# Patient Record
Sex: MALE | Race: WHITE | NOT HISPANIC OR LATINO | Employment: FULL TIME | URBAN - METROPOLITAN AREA
[De-identification: names, ages, dates, MRNs, and addresses within clinical notes are randomized per-mention and may not be internally consistent; named-entity substitution may affect disease eponyms.]

---

## 2017-05-09 ENCOUNTER — OFFICE VISIT (OUTPATIENT)
Dept: ORTHOPEDIC SURGERY | Facility: CLINIC | Age: 52
End: 2017-05-09

## 2017-05-09 VITALS — TEMPERATURE: 98.1 F | HEIGHT: 69 IN | WEIGHT: 266.2 LBS | BODY MASS INDEX: 39.43 KG/M2

## 2017-05-09 DIAGNOSIS — M17.11 PRIMARY OSTEOARTHRITIS OF RIGHT KNEE: Primary | ICD-10-CM

## 2017-05-09 PROCEDURE — 99203 OFFICE O/P NEW LOW 30 MIN: CPT | Performed by: ORTHOPAEDIC SURGERY

## 2017-05-09 RX ORDER — MELOXICAM 7.5 MG/1
15 TABLET ORAL ONCE
Status: CANCELLED | OUTPATIENT
Start: 2017-05-09 | End: 2017-05-09

## 2017-05-09 RX ORDER — LAMOTRIGINE 200 MG/1
TABLET ORAL
Refills: 3 | COMMUNITY
Start: 2017-02-16 | End: 2017-06-15 | Stop reason: SDUPTHER

## 2017-05-09 RX ORDER — MELATONIN
1000 DAILY
COMMUNITY
End: 2017-06-15 | Stop reason: SDUPTHER

## 2017-05-09 RX ORDER — LETROZOLE 2.5 MG/1
2.5 TABLET, FILM COATED ORAL
COMMUNITY
End: 2017-06-15

## 2017-05-09 RX ORDER — PREGABALIN 25 MG/1
150 CAPSULE ORAL ONCE
Status: CANCELLED | OUTPATIENT
Start: 2017-05-09 | End: 2017-05-09

## 2017-05-09 RX ORDER — FENOFIBRATE 160 MG/1
TABLET ORAL
COMMUNITY
Start: 2017-05-08 | End: 2017-06-15

## 2017-05-09 RX ORDER — CEFAZOLIN SODIUM 2 G/100ML
2 INJECTION, SOLUTION INTRAVENOUS ONCE
Status: CANCELLED | OUTPATIENT
Start: 2017-05-09 | End: 2017-05-09

## 2017-05-09 RX ORDER — NAPROXEN SODIUM 220 MG
220 TABLET ORAL 2 TIMES DAILY PRN
COMMUNITY
End: 2017-06-27 | Stop reason: HOSPADM

## 2017-05-09 RX ORDER — NICOTINE POLACRILEX 2 MG
5000 LOZENGE BUCCAL DAILY
COMMUNITY
End: 2022-03-02

## 2017-05-09 RX ORDER — LISINOPRIL 20 MG/1
TABLET ORAL
Refills: 2 | COMMUNITY
Start: 2017-04-05 | End: 2017-06-15 | Stop reason: SDUPTHER

## 2017-05-09 RX ORDER — OMEPRAZOLE 10 MG/1
CAPSULE, DELAYED RELEASE ORAL
Refills: 2 | COMMUNITY
Start: 2017-04-05 | End: 2017-06-15 | Stop reason: SDUPTHER

## 2017-06-13 ENCOUNTER — TELEPHONE (OUTPATIENT)
Dept: ORTHOPEDIC SURGERY | Facility: CLINIC | Age: 52
End: 2017-06-13

## 2017-06-15 ENCOUNTER — APPOINTMENT (OUTPATIENT)
Dept: PREADMISSION TESTING | Facility: HOSPITAL | Age: 52
End: 2017-06-15

## 2017-06-15 VITALS
HEART RATE: 68 BPM | OXYGEN SATURATION: 96 % | BODY MASS INDEX: 39.01 KG/M2 | RESPIRATION RATE: 18 BRPM | DIASTOLIC BLOOD PRESSURE: 87 MMHG | TEMPERATURE: 98.4 F | WEIGHT: 263.4 LBS | HEIGHT: 69 IN | SYSTOLIC BLOOD PRESSURE: 145 MMHG

## 2017-06-15 DIAGNOSIS — M17.11 PRIMARY OSTEOARTHRITIS OF RIGHT KNEE: ICD-10-CM

## 2017-06-15 LAB
ANION GAP SERPL CALCULATED.3IONS-SCNC: 12.6 MMOL/L
BILIRUB UR QL STRIP: NEGATIVE
BUN BLD-MCNC: 15 MG/DL (ref 6–20)
BUN/CREAT SERPL: 12.1 (ref 7–25)
CALCIUM SPEC-SCNC: 9.8 MG/DL (ref 8.6–10.5)
CHLORIDE SERPL-SCNC: 98 MMOL/L (ref 98–107)
CLARITY UR: CLEAR
CO2 SERPL-SCNC: 27.4 MMOL/L (ref 22–29)
COLOR UR: YELLOW
CREAT BLD-MCNC: 1.24 MG/DL (ref 0.76–1.27)
DEPRECATED RDW RBC AUTO: 41.7 FL (ref 37–54)
ERYTHROCYTE [DISTWIDTH] IN BLOOD BY AUTOMATED COUNT: 13.7 % (ref 11.5–14.5)
GFR SERPL CREATININE-BSD FRML MDRD: 62 ML/MIN/1.73
GLUCOSE BLD-MCNC: 94 MG/DL (ref 65–99)
GLUCOSE UR STRIP-MCNC: NEGATIVE MG/DL
HCT VFR BLD AUTO: 52.7 % (ref 40.4–52.2)
HGB BLD-MCNC: 17.7 G/DL (ref 13.7–17.6)
HGB UR QL STRIP.AUTO: NEGATIVE
KETONES UR QL STRIP: NEGATIVE
LEUKOCYTE ESTERASE UR QL STRIP.AUTO: NEGATIVE
MCH RBC QN AUTO: 28.5 PG (ref 27–32.7)
MCHC RBC AUTO-ENTMCNC: 33.6 G/DL (ref 32.6–36.4)
MCV RBC AUTO: 85 FL (ref 79.8–96.2)
NITRITE UR QL STRIP: NEGATIVE
PH UR STRIP.AUTO: 5.5 [PH] (ref 5–8)
PLATELET # BLD AUTO: 199 10*3/MM3 (ref 140–500)
PMV BLD AUTO: 10.3 FL (ref 6–12)
POTASSIUM BLD-SCNC: 3.9 MMOL/L (ref 3.5–5.2)
PROT UR QL STRIP: NEGATIVE
RBC # BLD AUTO: 6.2 10*6/MM3 (ref 4.6–6)
SODIUM BLD-SCNC: 138 MMOL/L (ref 136–145)
SP GR UR STRIP: 1.02 (ref 1–1.03)
UROBILINOGEN UR QL STRIP: NORMAL
WBC NRBC COR # BLD: 5.55 10*3/MM3 (ref 4.5–10.7)

## 2017-06-15 PROCEDURE — 36415 COLL VENOUS BLD VENIPUNCTURE: CPT

## 2017-06-15 PROCEDURE — 93005 ELECTROCARDIOGRAM TRACING: CPT

## 2017-06-15 PROCEDURE — 93010 ELECTROCARDIOGRAM REPORT: CPT | Performed by: INTERNAL MEDICINE

## 2017-06-15 PROCEDURE — 80048 BASIC METABOLIC PNL TOTAL CA: CPT | Performed by: ORTHOPAEDIC SURGERY

## 2017-06-15 PROCEDURE — 85027 COMPLETE CBC AUTOMATED: CPT | Performed by: ORTHOPAEDIC SURGERY

## 2017-06-15 PROCEDURE — 81003 URINALYSIS AUTO W/O SCOPE: CPT | Performed by: ORTHOPAEDIC SURGERY

## 2017-06-15 RX ORDER — TESTOSTERONE 200 MG
PELLET (EA) IMPLANTATION
COMMUNITY
End: 2021-09-07

## 2017-06-15 RX ORDER — CHLORHEXIDINE GLUCONATE 500 MG/1
1 CLOTH TOPICAL TAKE AS DIRECTED
COMMUNITY
End: 2017-06-27 | Stop reason: HOSPADM

## 2017-06-15 RX ORDER — LAMOTRIGINE 200 MG/1
200 TABLET ORAL DAILY
COMMUNITY
End: 2021-08-10 | Stop reason: SDUPTHER

## 2017-06-15 RX ORDER — LISINOPRIL 20 MG/1
20 TABLET ORAL DAILY
COMMUNITY
End: 2021-08-10 | Stop reason: SDUPTHER

## 2017-06-15 RX ORDER — OMEPRAZOLE 10 MG/1
20 CAPSULE, DELAYED RELEASE ORAL DAILY
COMMUNITY
End: 2021-07-02 | Stop reason: SDUPTHER

## 2017-06-15 NOTE — DISCHARGE INSTRUCTIONS
Take the following medications the morning of surgery with a small sip of water:  OMEPRAZOLE, BRING IN LISINOPRIL AM OF SURGERY    Arrive to hospital on your day of surgery at 9:00 am    General Instructions:  • Do not eat solid food after midnight the night before surgery.  • You may drink clear liquids day of surgery but must stop at least one hour before your hospital arrival time (8:00am)  • It is beneficial for you to have a clear drink that contains carbohydrates the day of surgery.  We suggest a 20 ounce bottle of Gatorade or Powerade for non-diabetic patients or a 20 ounce bottle of G2 or Powerade Zero for diabetic patients. (Pediatric patients, are not advised to drink a 20 ounce carbohydrate drink)    Clear liquids are liquids you can see through.  Nothing red in color.     Plain water                               Sports drinks  Sodas                                   Gelatin (Jell-O)  Fruit juices without pulp such as white grape juice and apple juice  Popsicles that contain no fruit or yogurt  Tea or coffee (no cream or milk added)  Gatorade / Powerade  G2 / Powerade Zero    • Infants may have breast milk up to four hours before surgery.  • Infants drinking formula may drink formula up to six hours before surgery.   • Patients who avoid smoking, chewing tobacco and alcohol for 4 weeks prior to surgery have a reduced risk of post-operative complications.  Quit smoking as many days before surgery as you can.  • Do not smoke, use chewing tobacco or drink alcohol the day of surgery.   • If applicable bring your C-PAP/ BI-PAP machine.  • Bring any papers given to you in the doctor’s office.  • Wear clean comfortable clothes and socks.  • Do not wear contact lenses or make-up.  Bring a case for your glasses.   • Bring crutches or walker if applicable.  • Leave all other valuables and jewelry at home.  • The Pre-Admission Testing nurse will instruct you to bring medications if unable to obtain an accurate list  in Pre-Admission Testing.        If you were given a blood bank ID arm band remember to bring it with you the day of surgery.    Preventing a Surgical Site Infection:  • For 2 to 3 days before surgery, avoid shaving with a razor because the razor can irritate skin and make it easier to develop an infection.  • The night prior to surgery sleep in a clean bed with clean clothing.  Do not allow pets to sleep with you.  • Shower on the morning of surgery using a fresh bar of anti-bacterial soap (such as Dial) and clean washcloth.  Dry with a clean towel and dress in clean clothing.  • Ask your surgeon if you will be receiving antibiotics prior to surgery.  • Make sure you, your family, and all healthcare providers clean their hands with soap and water or an alcohol based hand  before caring for you or your wound.    Day of surgery:  Upon arrival, a Pre-op nurse and Anesthesiologist will review your health history, obtain vital signs, and answer questions you may have.  The only belongings needed at this time will be your home medications and if applicable your C-PAP/BI-PAP machine.  If you are staying overnight your family can leave the rest of your belongings in the car and bring them to your room later.  A Pre-op nurse will start an IV and you may receive medication in preparation for surgery, including something to help you relax.  Your family will be able to see you in the Pre-op area.  While you are in surgery your family should notify the waiting room  if they leave the waiting room area and provide a contact phone number.    Please be aware that surgery does come with discomfort.  We want to make every effort to control your discomfort so please discuss any uncontrolled symptoms with your nurse.   Your doctor will most likely have prescribed pain medications.      If you are going home after surgery you will receive individualized written care instructions before being discharged.  A  responsible adult must drive you to and from the hospital on the day of your surgery and stay with you for 24 hours.    If you are staying overnight following surgery, you will be transported to your hospital room following the recovery period.  [facility] has all private rooms.    If you have any questions please call Pre-Admission Testing at [phone].  Deductibles and co-payments are collected on the day of service. Please be prepared to pay the required co-pay, deductible or deposit on the day of service as defined by your plan.    2% CHLORAHEXIDINE GLUCONATE* CLOTH  Preparing or “prepping” skin before surgery can reduce the risk of infection at the surgical site. To make the process easier, [facility] has chosen disposable cloths moistened with a rinse-free, 2% Chlorhexidine Gluconate (CHG) antiseptic solution. The steps below outline the prepping process and should be carefully followed.        Use the prep cloth on the area that is circled in the diagram             Directions Night before Surgery  1) Shower using a fresh bar of anti-bacterial soap (such as Dial) and clean washcloth.  Use a clean towel to completely dry your skin.  2) Do not use any lotions, oils or creams on your skin.  3) Open the package and remove 1 cloth, wipe your skin for 30 seconds in a circular motion.  Allow to dry for 3 minutes.  4) Repeat #3 with second cloth.  5) Do not touch your eyes, ears, or mouth with the prep cloth.  6) Allow the wet prep solution to air dry.  7) Discard the prep cloth and wash your hands with soap and water.   8) Dress in clean bed clothes and sleep on fresh clean bed sheets.   9) You may experience some temporary itching after the prep.    Directions Day of Surgery  1) Repeat steps 1,2,3,4,5,6,7, and 9.   2) Dress in clean clothes before coming to the hospital.    BACTROBAN NASAL OINTMENT  There are many germs normally in your nose. Bactroban is an ointment that will help  reduce these germs. Please follow these instructions for Bactroban use:      ____The day before surgery in the morning  Date________    ____The day before surgery in the evening              Date________    ____The day of surgery in the morning    Date________    **Squirt ½ package of Bactroban Ointment onto a cotton applicator and apply to inside of 1st nostril.  Squirt the remaining Bactroban and apply to the inside of the other nostril.

## 2017-06-22 ENCOUNTER — OFFICE VISIT (OUTPATIENT)
Dept: ORTHOPEDIC SURGERY | Facility: CLINIC | Age: 52
End: 2017-06-22

## 2017-06-22 VITALS
SYSTOLIC BLOOD PRESSURE: 138 MMHG | WEIGHT: 264 LBS | BODY MASS INDEX: 39.1 KG/M2 | TEMPERATURE: 99 F | DIASTOLIC BLOOD PRESSURE: 90 MMHG | HEIGHT: 69 IN

## 2017-06-22 DIAGNOSIS — M17.11 PRIMARY LOCALIZED OSTEOARTHROSIS OF THE KNEE, RIGHT: Primary | ICD-10-CM

## 2017-06-22 PROCEDURE — 77077 JOINT SURVEY SINGLE VIEW: CPT | Performed by: NURSE PRACTITIONER

## 2017-06-22 PROCEDURE — S0260 H&P FOR SURGERY: HCPCS | Performed by: NURSE PRACTITIONER

## 2017-06-22 NOTE — H&P
"Patient: Anthony Brown    Date of Admission: 6/26/17    YOB: 1965    Medical Record Number: [id number]    Admitting Physician: Dr. Bradford Torres    Reason for Admission: End Stage Right Knee OA    History of Present Illness: 51 y.o. male presents with severe end stage knee osteoarthritis which has not been responsive to the full compliment of conservative measures. Despite conservative attempts, there is still severe, constant activity limiting pain. Given the severity of the pain, the patient has elected to proceed with knee replacement.    Allergies: No Known Allergies      Current Medications:  Scheduled Meds:  PRN Meds:.    PMH:     Past Medical History:   Diagnosis Date   • Arthritis     bialteral knees   • Depression    • High cholesterol    • History of chest pain     6 YEARS AGO, HAD STRESS TEST NORMAL   • Hypertension    • Low testosterone level in male     HAS TESTOSTERONE PELLETS IMPLANTED   • Sinusitis    • Sleep apnea with use of continuous positive airway pressure (CPAP)        PF/Surg/Soc Hx:     Past Surgical History:   Procedure Laterality Date   • HERNIA REPAIR     • KNEE SURGERY Right 1985   • TONSILLECTOMY  1970        Social History     Occupational History   • Not on file.     Social History Main Topics   • Smoking status: Never Smoker   • Smokeless tobacco: Not on file   • Alcohol use Yes      Comment: socially   • Drug use: No   • Sexual activity: Defer      Social History     Social History Narrative        Family History   Problem Relation Age of Onset   • Diabetes Mother    • No Known Problems Father    • Malig Hyperthermia Neg Hx          Review of Systems:   A 14 point review of systems was performed, pertinent positives discussed above, all other systems are negative    Physical Exam: 51 y.o. male  Vital Signs :   Vitals:    06/22/17 1355   BP: 138/90   Temp: 99 °F (37.2 °C)   TempSrc: Temporal Artery    Weight: 264 lb (120 kg)   Height: 69\" (175.3 cm)     General: Alert " and Oriented x 3, No acute distress.  Psych: mood and affect appropriate; recent and remote memory intact  Eyes: conjunctiva clear; pupils equally round and reactive, sclera nonicteric  CV: no peripheral edema  Resp: normal respiratory effort  Skin: no rashes or wounds; normal turgor  Musculosketetal; pain and crepitance with knee range of motion  Vascular: palpable distal pulses    Xrays:  -3 views (AP, lateral, and sunrise) were reviewed demonstrating end-stage OA with bone on bone articulation.  -A full length AP xray was ordered today for purposes of operative alignment demonstrating end stage arthritic findings. There are no previous full length films for review    Assessment:  End-stage Right knee osteoarthritis. Conservative measures have failed.      Plan:  The plan is to proceed with Right Total Knee Replacement. The patient voiced understanding of the risks, benefits, and alternative forms of treatment that were discussed with Dr Torres at the time of scheduling.  Patient is planning on going home with home health next day.  In addition he would like his left knee injected with cortisone in OR    Tram Puente, APRN  6/22/2017

## 2017-06-26 ENCOUNTER — HOSPITAL ENCOUNTER (INPATIENT)
Facility: HOSPITAL | Age: 52
LOS: 1 days | Discharge: HOME HEALTH | End: 2017-06-27
Attending: ORTHOPAEDIC SURGERY | Admitting: ORTHOPAEDIC SURGERY

## 2017-06-26 ENCOUNTER — APPOINTMENT (OUTPATIENT)
Dept: GENERAL RADIOLOGY | Facility: HOSPITAL | Age: 52
End: 2017-06-26

## 2017-06-26 ENCOUNTER — ANESTHESIA (OUTPATIENT)
Dept: PERIOP | Facility: HOSPITAL | Age: 52
End: 2017-06-26

## 2017-06-26 ENCOUNTER — ANESTHESIA EVENT (OUTPATIENT)
Dept: PERIOP | Facility: HOSPITAL | Age: 52
End: 2017-06-26

## 2017-06-26 DIAGNOSIS — R26.2 DIFFICULTY WALKING: Primary | ICD-10-CM

## 2017-06-26 DIAGNOSIS — M17.11 PRIMARY OSTEOARTHRITIS OF RIGHT KNEE: ICD-10-CM

## 2017-06-26 PROCEDURE — 25010000002 MORPHINE PER 10 MG: Performed by: ANESTHESIOLOGY

## 2017-06-26 PROCEDURE — 20610 DRAIN/INJ JOINT/BURSA W/O US: CPT | Performed by: ORTHOPAEDIC SURGERY

## 2017-06-26 PROCEDURE — 25010000002 FENTANYL CITRATE (PF) 100 MCG/2ML SOLUTION: Performed by: ANESTHESIOLOGY

## 2017-06-26 PROCEDURE — C1713 ANCHOR/SCREW BN/BN,TIS/BN: HCPCS | Performed by: ORTHOPAEDIC SURGERY

## 2017-06-26 PROCEDURE — 25010000003 CEFAZOLIN IN DEXTROSE 2-4 GM/100ML-% SOLUTION: Performed by: ORTHOPAEDIC SURGERY

## 2017-06-26 PROCEDURE — 25010000002 METHYLPREDNISOLONE PER 40 MG: Performed by: ORTHOPAEDIC SURGERY

## 2017-06-26 PROCEDURE — 3E0U3BZ INTRODUCTION OF ANESTHETIC AGENT INTO JOINTS, PERCUTANEOUS APPROACH: ICD-10-PCS | Performed by: ORTHOPAEDIC SURGERY

## 2017-06-26 PROCEDURE — 25010000002 ONDANSETRON PER 1 MG: Performed by: NURSE ANESTHETIST, CERTIFIED REGISTERED

## 2017-06-26 PROCEDURE — 27447 TOTAL KNEE ARTHROPLASTY: CPT | Performed by: ORTHOPAEDIC SURGERY

## 2017-06-26 PROCEDURE — 25010000002 PROPOFOL 10 MG/ML EMULSION: Performed by: ANESTHESIOLOGY

## 2017-06-26 PROCEDURE — 3E0U33Z INTRODUCTION OF ANTI-INFLAMMATORY INTO JOINTS, PERCUTANEOUS APPROACH: ICD-10-PCS | Performed by: ORTHOPAEDIC SURGERY

## 2017-06-26 PROCEDURE — 25010000003 CEFAZOLIN IN DEXTROSE 2-4 GM/100ML-% SOLUTION: Performed by: ANESTHESIOLOGY

## 2017-06-26 PROCEDURE — 25010000002 MIDAZOLAM PER 1 MG: Performed by: ANESTHESIOLOGY

## 2017-06-26 PROCEDURE — 25010000002 HYDROMORPHONE PER 4 MG: Performed by: NURSE ANESTHETIST, CERTIFIED REGISTERED

## 2017-06-26 PROCEDURE — 25010000002 VANCOMYCIN: Performed by: ORTHOPAEDIC SURGERY

## 2017-06-26 PROCEDURE — 0SRC0J9 REPLACEMENT OF RIGHT KNEE JOINT WITH SYNTHETIC SUBSTITUTE, CEMENTED, OPEN APPROACH: ICD-10-PCS | Performed by: ORTHOPAEDIC SURGERY

## 2017-06-26 PROCEDURE — 25010000002 ONDANSETRON PER 1 MG: Performed by: ORTHOPAEDIC SURGERY

## 2017-06-26 PROCEDURE — 25010000002 DEXAMETHASONE PER 1 MG: Performed by: ANESTHESIOLOGY

## 2017-06-26 PROCEDURE — 73560 X-RAY EXAM OF KNEE 1 OR 2: CPT

## 2017-06-26 PROCEDURE — C1776 JOINT DEVICE (IMPLANTABLE): HCPCS | Performed by: ORTHOPAEDIC SURGERY

## 2017-06-26 DEVICE — LEGION CRUCIATE RETAINING OXINIUM                                    FEMORAL SIZE 4 RIGHT
Type: IMPLANTABLE DEVICE | Site: KNEE | Status: FUNCTIONAL
Brand: LEGION

## 2017-06-26 DEVICE — GENESIS II CRUCIATE RETAINING DEEP                                    FLEXION INSERT SIZE5-6 9MM
Type: IMPLANTABLE DEVICE | Site: KNEE | Status: FUNCTIONAL
Brand: GENESIS II

## 2017-06-26 DEVICE — GENESIS II BICONVEX PATELLA 29MM
Type: IMPLANTABLE DEVICE | Site: KNEE | Status: FUNCTIONAL
Brand: GENESIS II

## 2017-06-26 DEVICE — IMPLANTABLE DEVICE: Type: IMPLANTABLE DEVICE | Site: KNEE | Status: FUNCTIONAL

## 2017-06-26 DEVICE — IMPLANTABLE DEVICE: Type: IMPLANTABLE DEVICE | Status: FUNCTIONAL

## 2017-06-26 DEVICE — GENESIS II NON-POROUS TIBIAL                                    BASEPLATE SIZE 5 RIGHT
Type: IMPLANTABLE DEVICE | Site: KNEE | Status: FUNCTIONAL
Brand: GENESIS II

## 2017-06-26 RX ORDER — KETOROLAC TROMETHAMINE 30 MG/ML
30 INJECTION, SOLUTION INTRAMUSCULAR; INTRAVENOUS EVERY 8 HOURS
Status: DISCONTINUED | OUTPATIENT
Start: 2017-06-26 | End: 2017-06-27 | Stop reason: HOSPADM

## 2017-06-26 RX ORDER — MELOXICAM 7.5 MG/1
15 TABLET ORAL ONCE
Status: DISCONTINUED | OUTPATIENT
Start: 2017-06-26 | End: 2017-06-26 | Stop reason: HOSPADM

## 2017-06-26 RX ORDER — DOCUSATE SODIUM 100 MG/1
100 CAPSULE, LIQUID FILLED ORAL 2 TIMES DAILY
Status: DISCONTINUED | OUTPATIENT
Start: 2017-06-26 | End: 2017-06-27 | Stop reason: HOSPADM

## 2017-06-26 RX ORDER — ONDANSETRON 2 MG/ML
4 INJECTION INTRAMUSCULAR; INTRAVENOUS EVERY 6 HOURS PRN
Status: DISCONTINUED | OUTPATIENT
Start: 2017-06-26 | End: 2017-06-27 | Stop reason: HOSPADM

## 2017-06-26 RX ORDER — FAMOTIDINE 10 MG/ML
20 INJECTION, SOLUTION INTRAVENOUS ONCE
Status: COMPLETED | OUTPATIENT
Start: 2017-06-26 | End: 2017-06-26

## 2017-06-26 RX ORDER — ACETAMINOPHEN 500 MG
1000 TABLET ORAL EVERY 6 HOURS PRN
COMMUNITY
End: 2017-06-27 | Stop reason: HOSPADM

## 2017-06-26 RX ORDER — BUPIVACAINE HYDROCHLORIDE AND EPINEPHRINE 5; 5 MG/ML; UG/ML
INJECTION, SOLUTION PERINEURAL AS NEEDED
Status: DISCONTINUED | OUTPATIENT
Start: 2017-06-26 | End: 2017-06-26 | Stop reason: HOSPADM

## 2017-06-26 RX ORDER — PREGABALIN 25 MG/1
150 CAPSULE ORAL ONCE
Status: COMPLETED | OUTPATIENT
Start: 2017-06-26 | End: 2017-06-26

## 2017-06-26 RX ORDER — LABETALOL HYDROCHLORIDE 5 MG/ML
5 INJECTION, SOLUTION INTRAVENOUS
Status: DISCONTINUED | OUTPATIENT
Start: 2017-06-26 | End: 2017-06-26 | Stop reason: HOSPADM

## 2017-06-26 RX ORDER — HYDROCODONE BITARTRATE AND ACETAMINOPHEN 7.5; 325 MG/1; MG/1
1 TABLET ORAL EVERY 4 HOURS PRN
Status: DISCONTINUED | OUTPATIENT
Start: 2017-06-26 | End: 2017-06-27 | Stop reason: HOSPADM

## 2017-06-26 RX ORDER — HYDROCODONE BITARTRATE AND ACETAMINOPHEN 7.5; 325 MG/1; MG/1
2 TABLET ORAL EVERY 4 HOURS PRN
Status: DISCONTINUED | OUTPATIENT
Start: 2017-06-26 | End: 2017-06-26

## 2017-06-26 RX ORDER — PROMETHAZINE HYDROCHLORIDE 25 MG/1
25 TABLET ORAL ONCE AS NEEDED
Status: DISCONTINUED | OUTPATIENT
Start: 2017-06-26 | End: 2017-06-26 | Stop reason: HOSPADM

## 2017-06-26 RX ORDER — PROMETHAZINE HYDROCHLORIDE 25 MG/ML
12.5 INJECTION, SOLUTION INTRAMUSCULAR; INTRAVENOUS ONCE AS NEEDED
Status: DISCONTINUED | OUTPATIENT
Start: 2017-06-26 | End: 2017-06-26 | Stop reason: HOSPADM

## 2017-06-26 RX ORDER — HYDROCODONE BITARTRATE AND ACETAMINOPHEN 7.5; 325 MG/1; MG/1
1 TABLET ORAL EVERY 4 HOURS PRN
Status: DISCONTINUED | OUTPATIENT
Start: 2017-06-26 | End: 2017-06-26

## 2017-06-26 RX ORDER — SODIUM CHLORIDE, SODIUM LACTATE, POTASSIUM CHLORIDE, CALCIUM CHLORIDE 600; 310; 30; 20 MG/100ML; MG/100ML; MG/100ML; MG/100ML
9 INJECTION, SOLUTION INTRAVENOUS CONTINUOUS
Status: CANCELLED | OUTPATIENT
Start: 2017-06-26

## 2017-06-26 RX ORDER — UREA 10 %
1 LOTION (ML) TOPICAL NIGHTLY PRN
Status: DISCONTINUED | OUTPATIENT
Start: 2017-06-26 | End: 2017-06-27 | Stop reason: HOSPADM

## 2017-06-26 RX ORDER — SODIUM CHLORIDE 0.9 % (FLUSH) 0.9 %
1-10 SYRINGE (ML) INJECTION AS NEEDED
Status: CANCELLED | OUTPATIENT
Start: 2017-06-26

## 2017-06-26 RX ORDER — DIPHENHYDRAMINE HYDROCHLORIDE 50 MG/ML
12.5 INJECTION INTRAMUSCULAR; INTRAVENOUS
Status: DISCONTINUED | OUTPATIENT
Start: 2017-06-26 | End: 2017-06-26 | Stop reason: HOSPADM

## 2017-06-26 RX ORDER — HYDRALAZINE HYDROCHLORIDE 20 MG/ML
5 INJECTION INTRAMUSCULAR; INTRAVENOUS
Status: DISCONTINUED | OUTPATIENT
Start: 2017-06-26 | End: 2017-06-26 | Stop reason: HOSPADM

## 2017-06-26 RX ORDER — MIDAZOLAM HYDROCHLORIDE 1 MG/ML
2 INJECTION INTRAMUSCULAR; INTRAVENOUS
Status: DISCONTINUED | OUTPATIENT
Start: 2017-06-26 | End: 2017-06-26 | Stop reason: HOSPADM

## 2017-06-26 RX ORDER — LISINOPRIL 20 MG/1
20 TABLET ORAL DAILY
Status: DISCONTINUED | OUTPATIENT
Start: 2017-06-27 | End: 2017-06-27 | Stop reason: HOSPADM

## 2017-06-26 RX ORDER — SODIUM CHLORIDE, SODIUM LACTATE, POTASSIUM CHLORIDE, CALCIUM CHLORIDE 600; 310; 30; 20 MG/100ML; MG/100ML; MG/100ML; MG/100ML
9 INJECTION, SOLUTION INTRAVENOUS CONTINUOUS
Status: DISCONTINUED | OUTPATIENT
Start: 2017-06-26 | End: 2017-06-26 | Stop reason: HOSPADM

## 2017-06-26 RX ORDER — ACETAMINOPHEN 325 MG/1
325 TABLET ORAL EVERY 4 HOURS PRN
Status: DISCONTINUED | OUTPATIENT
Start: 2017-06-26 | End: 2017-06-27 | Stop reason: HOSPADM

## 2017-06-26 RX ORDER — MELOXICAM 15 MG/1
15 TABLET ORAL DAILY
Status: DISCONTINUED | OUTPATIENT
Start: 2017-06-26 | End: 2017-06-27 | Stop reason: HOSPADM

## 2017-06-26 RX ORDER — ACETAMINOPHEN 325 MG/1
650 TABLET ORAL EVERY 4 HOURS PRN
Status: DISCONTINUED | OUTPATIENT
Start: 2017-06-26 | End: 2017-06-27 | Stop reason: HOSPADM

## 2017-06-26 RX ORDER — ONDANSETRON 4 MG/1
4 TABLET, FILM COATED ORAL EVERY 6 HOURS PRN
Status: DISCONTINUED | OUTPATIENT
Start: 2017-06-26 | End: 2017-06-27 | Stop reason: HOSPADM

## 2017-06-26 RX ORDER — DEXAMETHASONE SODIUM PHOSPHATE 10 MG/ML
INJECTION INTRAMUSCULAR; INTRAVENOUS AS NEEDED
Status: DISCONTINUED | OUTPATIENT
Start: 2017-06-26 | End: 2017-06-26 | Stop reason: SURG

## 2017-06-26 RX ORDER — MIDAZOLAM HYDROCHLORIDE 1 MG/ML
2 INJECTION INTRAMUSCULAR; INTRAVENOUS
Status: CANCELLED | OUTPATIENT
Start: 2017-06-26

## 2017-06-26 RX ORDER — NALOXONE HCL 0.4 MG/ML
0.2 VIAL (ML) INJECTION AS NEEDED
Status: DISCONTINUED | OUTPATIENT
Start: 2017-06-26 | End: 2017-06-26 | Stop reason: HOSPADM

## 2017-06-26 RX ORDER — OXYCODONE AND ACETAMINOPHEN 7.5; 325 MG/1; MG/1
1 TABLET ORAL ONCE AS NEEDED
Status: DISCONTINUED | OUTPATIENT
Start: 2017-06-26 | End: 2017-06-26 | Stop reason: HOSPADM

## 2017-06-26 RX ORDER — PROMETHAZINE HYDROCHLORIDE 25 MG/1
12.5 TABLET ORAL ONCE AS NEEDED
Status: DISCONTINUED | OUTPATIENT
Start: 2017-06-26 | End: 2017-06-26 | Stop reason: HOSPADM

## 2017-06-26 RX ORDER — FLUMAZENIL 0.1 MG/ML
0.2 INJECTION INTRAVENOUS AS NEEDED
Status: DISCONTINUED | OUTPATIENT
Start: 2017-06-26 | End: 2017-06-26 | Stop reason: HOSPADM

## 2017-06-26 RX ORDER — EPHEDRINE SULFATE 50 MG/ML
5 INJECTION, SOLUTION INTRAVENOUS ONCE AS NEEDED
Status: DISCONTINUED | OUTPATIENT
Start: 2017-06-26 | End: 2017-06-26 | Stop reason: HOSPADM

## 2017-06-26 RX ORDER — PANTOPRAZOLE SODIUM 40 MG/1
40 TABLET, DELAYED RELEASE ORAL
Status: DISCONTINUED | OUTPATIENT
Start: 2017-06-27 | End: 2017-06-27 | Stop reason: HOSPADM

## 2017-06-26 RX ORDER — CEFAZOLIN SODIUM 2 G/100ML
2 INJECTION, SOLUTION INTRAVENOUS ONCE
Status: DISCONTINUED | OUTPATIENT
Start: 2017-06-26 | End: 2017-06-26 | Stop reason: HOSPADM

## 2017-06-26 RX ORDER — GLYCOPYRROLATE 0.2 MG/ML
INJECTION INTRAMUSCULAR; INTRAVENOUS AS NEEDED
Status: DISCONTINUED | OUTPATIENT
Start: 2017-06-26 | End: 2017-06-26 | Stop reason: SURG

## 2017-06-26 RX ORDER — MIDAZOLAM HYDROCHLORIDE 1 MG/ML
1 INJECTION INTRAMUSCULAR; INTRAVENOUS
Status: CANCELLED | OUTPATIENT
Start: 2017-06-26

## 2017-06-26 RX ORDER — FENTANYL CITRATE 50 UG/ML
50 INJECTION, SOLUTION INTRAMUSCULAR; INTRAVENOUS
Status: CANCELLED | OUTPATIENT
Start: 2017-06-26

## 2017-06-26 RX ORDER — LAMOTRIGINE 100 MG/1
200 TABLET ORAL DAILY
Status: DISCONTINUED | OUTPATIENT
Start: 2017-06-26 | End: 2017-06-27 | Stop reason: HOSPADM

## 2017-06-26 RX ORDER — HYDROCODONE BITARTRATE AND ACETAMINOPHEN 7.5; 325 MG/1; MG/1
1 TABLET ORAL ONCE AS NEEDED
Status: DISCONTINUED | OUTPATIENT
Start: 2017-06-26 | End: 2017-06-26 | Stop reason: HOSPADM

## 2017-06-26 RX ORDER — SODIUM CHLORIDE 0.9 % (FLUSH) 0.9 %
1-10 SYRINGE (ML) INJECTION AS NEEDED
Status: DISCONTINUED | OUTPATIENT
Start: 2017-06-26 | End: 2017-06-26 | Stop reason: HOSPADM

## 2017-06-26 RX ORDER — METHYLPREDNISOLONE ACETATE 40 MG/ML
INJECTION, SUSPENSION INTRA-ARTICULAR; INTRALESIONAL; INTRAMUSCULAR; SOFT TISSUE AS NEEDED
Status: DISCONTINUED | OUTPATIENT
Start: 2017-06-26 | End: 2017-06-26 | Stop reason: HOSPADM

## 2017-06-26 RX ORDER — MORPHINE SULFATE 1 MG/ML
INJECTION, SOLUTION EPIDURAL; INTRATHECAL; INTRAVENOUS AS NEEDED
Status: DISCONTINUED | OUTPATIENT
Start: 2017-06-26 | End: 2017-06-26 | Stop reason: SURG

## 2017-06-26 RX ORDER — ROCURONIUM BROMIDE 10 MG/ML
INJECTION, SOLUTION INTRAVENOUS AS NEEDED
Status: DISCONTINUED | OUTPATIENT
Start: 2017-06-26 | End: 2017-06-26 | Stop reason: SURG

## 2017-06-26 RX ORDER — PROPOFOL 10 MG/ML
VIAL (ML) INTRAVENOUS AS NEEDED
Status: DISCONTINUED | OUTPATIENT
Start: 2017-06-26 | End: 2017-06-26 | Stop reason: SURG

## 2017-06-26 RX ORDER — CEFAZOLIN SODIUM 2 G/100ML
2 INJECTION, SOLUTION INTRAVENOUS EVERY 8 HOURS
Status: COMPLETED | OUTPATIENT
Start: 2017-06-26 | End: 2017-06-27

## 2017-06-26 RX ORDER — CEFAZOLIN SODIUM 2 G/100ML
INJECTION, SOLUTION INTRAVENOUS AS NEEDED
Status: DISCONTINUED | OUTPATIENT
Start: 2017-06-26 | End: 2017-06-26 | Stop reason: SURG

## 2017-06-26 RX ORDER — BUPIVACAINE HYDROCHLORIDE AND EPINEPHRINE 5; 5 MG/ML; UG/ML
INJECTION, SOLUTION EPIDURAL; INTRACAUDAL; PERINEURAL AS NEEDED
Status: DISCONTINUED | OUTPATIENT
Start: 2017-06-26 | End: 2017-06-26 | Stop reason: SURG

## 2017-06-26 RX ORDER — PROMETHAZINE HYDROCHLORIDE 25 MG/1
25 SUPPOSITORY RECTAL ONCE AS NEEDED
Status: DISCONTINUED | OUTPATIENT
Start: 2017-06-26 | End: 2017-06-26 | Stop reason: HOSPADM

## 2017-06-26 RX ORDER — FAMOTIDINE 10 MG/ML
20 INJECTION, SOLUTION INTRAVENOUS ONCE
Status: CANCELLED | OUTPATIENT
Start: 2017-06-26 | End: 2017-06-26

## 2017-06-26 RX ORDER — ONDANSETRON 4 MG/1
4 TABLET, ORALLY DISINTEGRATING ORAL EVERY 6 HOURS PRN
Status: DISCONTINUED | OUTPATIENT
Start: 2017-06-26 | End: 2017-06-27 | Stop reason: HOSPADM

## 2017-06-26 RX ORDER — MIDAZOLAM HYDROCHLORIDE 1 MG/ML
INJECTION INTRAMUSCULAR; INTRAVENOUS AS NEEDED
Status: DISCONTINUED | OUTPATIENT
Start: 2017-06-26 | End: 2017-06-26 | Stop reason: SURG

## 2017-06-26 RX ORDER — ASPIRIN 325 MG
325 TABLET, DELAYED RELEASE (ENTERIC COATED) ORAL 2 TIMES DAILY
Status: DISCONTINUED | OUTPATIENT
Start: 2017-06-26 | End: 2017-06-27 | Stop reason: HOSPADM

## 2017-06-26 RX ORDER — ONDANSETRON 2 MG/ML
4 INJECTION INTRAMUSCULAR; INTRAVENOUS ONCE AS NEEDED
Status: COMPLETED | OUTPATIENT
Start: 2017-06-26 | End: 2017-06-26

## 2017-06-26 RX ORDER — HYDROMORPHONE HYDROCHLORIDE 1 MG/ML
0.5 INJECTION, SOLUTION INTRAMUSCULAR; INTRAVENOUS; SUBCUTANEOUS
Status: DISCONTINUED | OUTPATIENT
Start: 2017-06-26 | End: 2017-06-26 | Stop reason: HOSPADM

## 2017-06-26 RX ORDER — EPHEDRINE SULFATE 50 MG/ML
INJECTION, SOLUTION INTRAVENOUS AS NEEDED
Status: DISCONTINUED | OUTPATIENT
Start: 2017-06-26 | End: 2017-06-26 | Stop reason: SURG

## 2017-06-26 RX ORDER — POLYETHYLENE GLYCOL 3350 17 G/17G
17 POWDER, FOR SOLUTION ORAL 2 TIMES DAILY
Status: DISCONTINUED | OUTPATIENT
Start: 2017-06-26 | End: 2017-06-27 | Stop reason: HOSPADM

## 2017-06-26 RX ORDER — FENTANYL CITRATE 50 UG/ML
50 INJECTION, SOLUTION INTRAMUSCULAR; INTRAVENOUS
Status: DISCONTINUED | OUTPATIENT
Start: 2017-06-26 | End: 2017-06-26 | Stop reason: HOSPADM

## 2017-06-26 RX ORDER — LIDOCAINE HYDROCHLORIDE 20 MG/ML
INJECTION, SOLUTION INFILTRATION; PERINEURAL AS NEEDED
Status: DISCONTINUED | OUTPATIENT
Start: 2017-06-26 | End: 2017-06-26 | Stop reason: SURG

## 2017-06-26 RX ORDER — ACETAMINOPHEN 10 MG/ML
INJECTION, SOLUTION INTRAVENOUS AS NEEDED
Status: DISCONTINUED | OUTPATIENT
Start: 2017-06-26 | End: 2017-06-26 | Stop reason: SURG

## 2017-06-26 RX ORDER — HYDROCODONE BITARTRATE AND ACETAMINOPHEN 7.5; 325 MG/1; MG/1
2 TABLET ORAL EVERY 4 HOURS PRN
Status: DISCONTINUED | OUTPATIENT
Start: 2017-06-26 | End: 2017-06-27 | Stop reason: HOSPADM

## 2017-06-26 RX ORDER — FENTANYL CITRATE 50 UG/ML
INJECTION, SOLUTION INTRAMUSCULAR; INTRAVENOUS AS NEEDED
Status: DISCONTINUED | OUTPATIENT
Start: 2017-06-26 | End: 2017-06-26 | Stop reason: SURG

## 2017-06-26 RX ORDER — TRANEXAMIC ACID 100 MG/ML
INJECTION, SOLUTION INTRAVENOUS AS NEEDED
Status: DISCONTINUED | OUTPATIENT
Start: 2017-06-26 | End: 2017-06-26 | Stop reason: SURG

## 2017-06-26 RX ORDER — MIDAZOLAM HYDROCHLORIDE 1 MG/ML
1 INJECTION INTRAMUSCULAR; INTRAVENOUS
Status: DISCONTINUED | OUTPATIENT
Start: 2017-06-26 | End: 2017-06-26 | Stop reason: HOSPADM

## 2017-06-26 RX ADMIN — ASPIRIN 325 MG: 325 TABLET, DELAYED RELEASE ORAL at 21:29

## 2017-06-26 RX ADMIN — MUPIROCIN: 20 OINTMENT TOPICAL at 17:53

## 2017-06-26 RX ADMIN — MIDAZOLAM HYDROCHLORIDE 2 MG: 1 INJECTION, SOLUTION INTRAMUSCULAR; INTRAVENOUS at 12:10

## 2017-06-26 RX ADMIN — POLYETHYLENE GLYCOL 3350 17 G: 17 POWDER, FOR SOLUTION ORAL at 17:53

## 2017-06-26 RX ADMIN — SODIUM CHLORIDE, POTASSIUM CHLORIDE, SODIUM LACTATE AND CALCIUM CHLORIDE 500 ML: 600; 310; 30; 20 INJECTION, SOLUTION INTRAVENOUS at 09:23

## 2017-06-26 RX ADMIN — HYDROMORPHONE HYDROCHLORIDE 0.5 MG: 1 INJECTION, SOLUTION INTRAMUSCULAR; INTRAVENOUS; SUBCUTANEOUS at 15:08

## 2017-06-26 RX ADMIN — SUGAMMADEX 5 ML: 100 INJECTION, SOLUTION INTRAVENOUS at 13:47

## 2017-06-26 RX ADMIN — FAMOTIDINE 20 MG: 10 INJECTION INTRAVENOUS at 10:01

## 2017-06-26 RX ADMIN — DEXAMETHASONE SODIUM PHOSPHATE 8 MG: 10 INJECTION INTRAMUSCULAR; INTRAVENOUS at 12:43

## 2017-06-26 RX ADMIN — VANCOMYCIN HYDROCHLORIDE 1750 MG: 1 INJECTION, POWDER, LYOPHILIZED, FOR SOLUTION INTRAVENOUS at 09:37

## 2017-06-26 RX ADMIN — MIDAZOLAM HYDROCHLORIDE 2 MG: 1 INJECTION, SOLUTION INTRAMUSCULAR; INTRAVENOUS at 10:43

## 2017-06-26 RX ADMIN — LIDOCAINE HYDROCHLORIDE 100 MG: 20 INJECTION, SOLUTION INFILTRATION; PERINEURAL at 12:10

## 2017-06-26 RX ADMIN — FENTANYL CITRATE 250 MCG: 50 INJECTION, SOLUTION INTRAMUSCULAR; INTRAVENOUS at 12:10

## 2017-06-26 RX ADMIN — MIDAZOLAM 1 MG: 1 INJECTION INTRAMUSCULAR; INTRAVENOUS at 10:01

## 2017-06-26 RX ADMIN — ONDANSETRON 4 MG: 2 INJECTION INTRAMUSCULAR; INTRAVENOUS at 14:55

## 2017-06-26 RX ADMIN — CEFAZOLIN SODIUM 2 G: 2 INJECTION, SOLUTION INTRAVENOUS at 12:15

## 2017-06-26 RX ADMIN — ACETAMINOPHEN 1000 MG: 10 INJECTION, SOLUTION INTRAVENOUS at 12:20

## 2017-06-26 RX ADMIN — BUPIVACAINE HYDROCHLORIDE AND EPINEPHRINE BITARTRATE 20 ML: 5; .0091 INJECTION, SOLUTION EPIDURAL; INTRACAUDAL; PERINEURAL at 11:44

## 2017-06-26 RX ADMIN — DOCUSATE SODIUM 100 MG: 100 CAPSULE, LIQUID FILLED ORAL at 17:53

## 2017-06-26 RX ADMIN — TRANEXAMIC ACID 1000 MG: 100 INJECTION, SOLUTION INTRAVENOUS at 13:24

## 2017-06-26 RX ADMIN — EPHEDRINE SULFATE 25 MG: 50 INJECTION INTRAMUSCULAR; INTRAVENOUS; SUBCUTANEOUS at 12:48

## 2017-06-26 RX ADMIN — CEFAZOLIN SODIUM 2 G: 2 INJECTION, SOLUTION INTRAVENOUS at 19:37

## 2017-06-26 RX ADMIN — HYDROCODONE BITARTRATE AND ACETAMINOPHEN 1 TABLET: 7.5; 325 TABLET ORAL at 21:29

## 2017-06-26 RX ADMIN — GLYCOPYRROLATE 0.2 MG: 0.2 INJECTION INTRAMUSCULAR; INTRAVENOUS at 12:50

## 2017-06-26 RX ADMIN — PREGABALIN 150 MG: 25 CAPSULE ORAL at 09:26

## 2017-06-26 RX ADMIN — MORPHINE SULFATE 10 MG: 1 INJECTION EPIDURAL; INTRATHECAL; INTRAVENOUS at 12:40

## 2017-06-26 RX ADMIN — ONDANSETRON 4 MG: 2 INJECTION INTRAMUSCULAR; INTRAVENOUS at 19:37

## 2017-06-26 RX ADMIN — SODIUM CHLORIDE, POTASSIUM CHLORIDE, SODIUM LACTATE AND CALCIUM CHLORIDE 9 ML/HR: 600; 310; 30; 20 INJECTION, SOLUTION INTRAVENOUS at 11:00

## 2017-06-26 RX ADMIN — PROPOFOL 200 MG: 10 INJECTION, EMULSION INTRAVENOUS at 12:10

## 2017-06-26 RX ADMIN — SODIUM CHLORIDE, POTASSIUM CHLORIDE, SODIUM LACTATE AND CALCIUM CHLORIDE: 600; 310; 30; 20 INJECTION, SOLUTION INTRAVENOUS at 12:04

## 2017-06-26 RX ADMIN — FENTANYL CITRATE 50 MCG: 50 INJECTION INTRAMUSCULAR; INTRAVENOUS at 10:43

## 2017-06-26 RX ADMIN — ROCURONIUM BROMIDE 50 MG: 10 INJECTION INTRAVENOUS at 12:10

## 2017-06-26 NOTE — PERIOPERATIVE NURSING NOTE
PATIENT AND MD DISCUSSED IN ADDITION TO THE TOTAL RIGHT KNEE REPLACEMENT, ALSO DOING A CORTISONE INJECTION OF THE LEFT KNEE WHILE IN SURGERY AS WELL. MD AGREED. PROCEDURE ADDED TO CONSENT, AND CONSENT SIGNED BY BOTH PATIENT AND SURGEON.

## 2017-06-26 NOTE — ANESTHESIA PROCEDURE NOTES
Peripheral Block    Patient location during procedure: holding area  Reason for block: at surgeon's request and post-op pain management  Performed by  Anesthesiologist: REUBEN LOPES  Preanesthetic Checklist  Completed: patient identified and risks and benefits discussed  Peripheral Block Prep:  Sterile barriers:gloves  Prep: ChloraPrep  Patient monitoring: blood pressure monitoring, continuous pulse oximetry and EKG  Peripheral Procedure  Sedation:yes  Guidance:ultrasound guided  Images:still images obtained    Laterality:right  Block Type:femoral and adductor canal block  Injection Technique:single-shot  Needle Type:short-bevel  Needle Gauge:21 G  ULTRASOUND INTERPRETATION.  Using ultrasound guidance a 21 G gauge needle was placed in close proximity to the femoral nerve, at which point, under ultrasound guidance anesthetic was injected in the area of the nerve and spread of the anesthesia was seen on ultrasound in close proximity thereto.  There were no abnormalities seen on ultrasound; a digital image was taken; and the patient tolerated the procedure with no complications.   Medications  Comment:     Local Amount Injected:20mL  Post Assessment  Injection Assessment: negative aspiration for heme, no paresthesia on injection and incremental injection  Patient Tolerance:comfortable throughout block  Complications:no  Additional Notes  Ultrasound Interpretation:  Using ultrasound guidance the needle was placed in close proximity to the femoral nerve and anesthetic was injected in the area of the femoral nerve and spread of the anesthetic was seen on ultrasound in close proximity thereto.  There were no abnormalities seen on ultrasound; a digital image was taken; and the patient tolerated the procedure with no complications.    Block placed for postoperative pain control per surgeon request.

## 2017-06-26 NOTE — PLAN OF CARE
Problem: Patient Care Overview (Adult)  Goal: Plan of Care Review  Outcome: Ongoing (interventions implemented as appropriate)    06/26/17 0932   Coping/Psychosocial Response Interventions   Plan Of Care Reviewed With patient   Patient Care Overview   Progress progress toward functional goals as expected       Goal: Adult Individualization and Mutuality  Outcome: Ongoing (interventions implemented as appropriate)    06/26/17 0932   Individualization   Patient Specific Preferences pt goes by Jeremy   Mutuality/Individual Preferences   What Information Would Help Us Give You More Personalized Care? pt denies       Goal: Discharge Needs Assessment  Outcome: Ongoing (interventions implemented as appropriate)    06/26/17 0928 06/26/17 0932   Discharge Needs Assessment   Concerns To Be Addressed --  denies needs/concerns at this time   Living Environment   Transportation Available car;family or friend will provide --    Self-Care   Equipment Currently Used at Home none --          Problem: Perioperative Period (Adult)  Goal: Signs and Symptoms of Listed Potential Problems Will be Absent or Manageable (Perioperative Period)  Outcome: Ongoing (interventions implemented as appropriate)

## 2017-06-26 NOTE — H&P (VIEW-ONLY)
"Patient: Anthony Brown    Date of Admission: 6/26/17    YOB: 1965    Medical Record Number: [id number]    Admitting Physician: Dr. Bradford Torres    Reason for Admission: End Stage Right Knee OA    History of Present Illness: 51 y.o. male presents with severe end stage knee osteoarthritis which has not been responsive to the full compliment of conservative measures. Despite conservative attempts, there is still severe, constant activity limiting pain. Given the severity of the pain, the patient has elected to proceed with knee replacement.    Allergies: No Known Allergies      Current Medications:  Scheduled Meds:  PRN Meds:.    PMH:     Past Medical History:   Diagnosis Date   • Arthritis     bialteral knees   • Depression    • High cholesterol    • History of chest pain     6 YEARS AGO, HAD STRESS TEST NORMAL   • Hypertension    • Low testosterone level in male     HAS TESTOSTERONE PELLETS IMPLANTED   • Sinusitis    • Sleep apnea with use of continuous positive airway pressure (CPAP)        PF/Surg/Soc Hx:     Past Surgical History:   Procedure Laterality Date   • HERNIA REPAIR     • KNEE SURGERY Right 1985   • TONSILLECTOMY  1970        Social History     Occupational History   • Not on file.     Social History Main Topics   • Smoking status: Never Smoker   • Smokeless tobacco: Not on file   • Alcohol use Yes      Comment: socially   • Drug use: No   • Sexual activity: Defer      Social History     Social History Narrative        Family History   Problem Relation Age of Onset   • Diabetes Mother    • No Known Problems Father    • Malig Hyperthermia Neg Hx          Review of Systems:   A 14 point review of systems was performed, pertinent positives discussed above, all other systems are negative    Physical Exam: 51 y.o. male  Vital Signs :   Vitals:    06/22/17 1355   BP: 138/90   Temp: 99 °F (37.2 °C)   TempSrc: Temporal Artery    Weight: 264 lb (120 kg)   Height: 69\" (175.3 cm)     General: Alert " and Oriented x 3, No acute distress.  Psych: mood and affect appropriate; recent and remote memory intact  Eyes: conjunctiva clear; pupils equally round and reactive, sclera nonicteric  CV: no peripheral edema  Resp: normal respiratory effort  Skin: no rashes or wounds; normal turgor  Musculosketetal; pain and crepitance with knee range of motion  Vascular: palpable distal pulses    Xrays:  -3 views (AP, lateral, and sunrise) were reviewed demonstrating end-stage OA with bone on bone articulation.  -A full length AP xray was ordered today for purposes of operative alignment demonstrating end stage arthritic findings. There are no previous full length films for review    Assessment:  End-stage Right knee osteoarthritis. Conservative measures have failed.      Plan:  The plan is to proceed with Right Total Knee Replacement. The patient voiced understanding of the risks, benefits, and alternative forms of treatment that were discussed with Dr Torres at the time of scheduling.  Patient is planning on going home with home health next day.  In addition he would like his left knee injected with cortisone in OR    Tram Puente, APRN  6/22/2017

## 2017-06-26 NOTE — PLAN OF CARE
Problem: Patient Care Overview (Adult)  Goal: Plan of Care Review  Outcome: Ongoing (interventions implemented as appropriate)    06/26/17 1618   Coping/Psychosocial Response Interventions   Plan Of Care Reviewed With patient   Patient Care Overview   Progress no change   Outcome Evaluation   Outcome Summary/Follow up Plan pt arrived on floor very drowsy, Monitoring BP to control HTN       Goal: Discharge Needs Assessment  Outcome: Ongoing (interventions implemented as appropriate)    06/26/17 1618   Discharge Needs Assessment   Concerns To Be Addressed no discharge needs identified         Problem: Perioperative Period (Adult)  Goal: Signs and Symptoms of Listed Potential Problems Will be Absent or Manageable (Perioperative Period)  Outcome: Ongoing (interventions implemented as appropriate)    06/26/17 1618   Perioperative Period   Problems Assessed (Perioperative Period) all   Problems Present (Perioperative Period) pain         Problem: Knee Replacement, Total (Adult)  Goal: Signs and Symptoms of Listed Potential Problems Will be Absent or Manageable (Knee Replacement, Total)  Outcome: Ongoing (interventions implemented as appropriate)    06/26/17 1618   Knee Replacement, Total   Problems Assessed (Total Knee Replacement) all   Problems Present (Total Knee Replacement) pain;neurovascular impairment         06/26/17 1618   Knee Replacement, Total   Problems Assessed (Total Knee Replacement) all   Problems Present (Total Knee Replacement) pain;decreased range of motion         Problem: Fall Risk (Adult)  Goal: Identify Related Risk Factors and Signs and Symptoms  Outcome: Outcome(s) achieved Date Met:  06/26/17 06/26/17 1618   Fall Risk   Fall Risk: Related Risk Factors culprit medication(s);gait/mobility problems   Fall Risk: Signs and Symptoms presence of risk factors       Goal: Absence of Falls  Outcome: Ongoing (interventions implemented as appropriate)    06/26/17 1618   Fall Risk (Adult)   Absence of  Falls achieves outcome

## 2017-06-26 NOTE — OP NOTE
Name: Anthony Brown  YOB: 1965    DATE OF SURGERY: 6/26/2017    PREOPERATIVE DIAGNOSIS: Bilateral knee end-stage osteoarthritis    POSTOPERATIVE DIAGNOSIS: Bilateral knee end-stage osteoarthritis    PROCEDURE PERFORMED: Right total knee replacement, Left knee injection    SURGEON: Bradford Torres M.D.    ASSISTANT: TERESA SCHWARZ    IMPLANTS: [employer]:     Implant Name Type Inv. Item Serial No.  Lot No. LRB No. Used   CMT BONE PALACOS R PLS/G W GENT 1X40 - [id number] Implant CMT BONE PALACOS R PLS/G W GENT 1X40  [employer] [id number] Right 2   COMP FEM LEGION OXINIUM CR SZ4 RT - [id number] Implant COMP FEM LEGION OXINIUM CR SZ4 RT  [employer] [id number] Right 1   BASE TIB GEN2 NONPOR TI SZ5 RT - [id number] Implant BASE TIB GEN2 NONPOR TI SZ5 RT  [employer] [id number] Right 1   PAT GEN2 BICONVEX [id number] - [id number] Implant PAT GEN2 BICONVEX [id number]  [employer] [id number] Right 1   INSRT KN CR FLX DEEP GEN2 SZ5 6 9MM - [id number] Implant INSRT KN CR FLX DEEP GEN2 SZ5 6 9MM   [employer] [id number] Right 1       Estimated Blood Loss: 200cc  Specimens : none  Complications: none    DESCRIPTION OF PROCEDURE: The patient was taken to the operating room and placed in the supine position. A sequential compression device was carefully placed on the non-operative leg. Preoperative antibiotics were administered. The left knee was then injected in sterile Fashion through a suprapatellar approach using 40 mg of Depo-Medrol and 5 cc of 1/2% Marcaine with epinephrine.  A Band-Aid was then applied to the puncture site.  Surgical time out was performed. After adequate induction of anesthesia, the leg was prepped and draped in the usual sterile fashion, exsanguinated with an Esmarch bandage and the tourniquet inflated to 250 mmHg. A midline incision was performed followed by a medial parapatellar arthrotomy. The patella was subluxed laterally.  A portion of the fat  pad, ACL, and anterior horns of the meniscus were excised. The drill hole was placed in the distal femur and the canal was the irrigated and suctioned. The IM steve was placed and a 5 degree distal valgus cut was performed on the femur. The femur was then sized with a sizing guide. The femoral cutting block was placed and all femoral cuts were performed. The proximal tibia was exposed. We used the extramedullary tibial cutting guide set for removal of 9mm of bone off the high side. The tibial cut was performed. The posterior horns of the menisci were excised. The posterior osteophytes were removed. Flexion extension blocks were then used to balance the knee. The tibial cut surface was then sized with the sizing templates and the tibial and femoral trial were then placed. The knee was placed in full extension and then the tibial tray rotation was then matched to the femoral rotation and marked.    Attention was then placed to the patella. The patella was noted to track centrally through range of motion. The patella was then sized with the trials. The thickness of the patella was then measured. The patella was resurfaced and the surrounding osteophytes were removed. The preoperative thickness was reproduced. The patella tracked centrally through range of motion.   At this point all trial components were removed, the knee was copiously irrigated with pulsed lavage, and the knee was injected with anesthetic cocktail solution. The cut surfaces were then dried with clean lap sponges, and the components were cemented tibia, followed by femur, then patella. The knee was held in full extension and all excess cement was removed. The knee was held still until the cement had completely hardened. We then placed the trial polyethylene spacer which resulted in full extension and excellent flexion-extension balance. We placed the final polyethylene spacer.   The knee was then copiously irrigated. The tourniquet was then released.  There was excellent hemostasis. We placed a one-eighth inch Hemovac drain. We closed the knee in multiple layers in standard fashion. Sterile dressing were applied. At the end of the case, the sponge and needle counts were reported as being correct. There were no known complications. The patient was then transported to the recovery room.      Brdaford Torres M.D.

## 2017-06-26 NOTE — ANESTHESIA POSTPROCEDURE EVALUATION
Patient: Anthony Brown    Procedure Summary     Date Anesthesia Start Anesthesia Stop Room / Location    06/26/17 1204 1421  [facility] /  [facility]       Procedure Diagnosis Surgeon Provider    RIGHT TOTAL KNEE ARTHROPLASTY, LEFT KNEE INJECTED WITH CORTISONE (N/A Knee) Primary osteoarthritis of right knee  (Primary osteoarthritis of right knee [M17.11]) MD Isaiah Vargas MD          Anesthesia Type: general  Last vitals  /92 (06/26/17 1450)    Temp 36.9 °C (98.5 °F) (06/26/17 1416)    Pulse 90 (06/26/17 1450)   Resp 20 (06/26/17 1450)    SpO2 94 % (06/26/17 1450)      Post Anesthesia Care and Evaluation    Patient location during evaluation: PACU  Level of consciousness: responsive to verbal stimuli  Anesthetic complications: No anesthetic complications

## 2017-06-26 NOTE — ANESTHESIA PROCEDURE NOTES
Airway  Urgency: elective    Date/Time: 6/26/2017 12:12 PM  End Time:6/26/2017 12:12 PM    General Information and Staff    Patient location during procedure: OR  Anesthesiologist: JOE MCLAIN    Indications and Patient Condition  Indications for airway management: airway protection    Preoxygenated: yes  Mask difficulty assessment: 3 - difficult mask (inadequate, unstable or two providers) +/- NMBA    Final Airway Details  Final airway type: endotracheal airway      Successful airway: ETT  Cuffed: yes   Successful intubation technique: direct laryngoscopy  Facilitating devices/methods: Bougie  Endotracheal tube insertion site: oral  Blade: Guerrero  Blade size: #2  ETT size: 8.0 mm  Cormack-Lehane Classification: grade IIa - partial view of glottis  Placement verified by: chest auscultation and capnometry   Number of attempts at approach: 1

## 2017-06-26 NOTE — PLAN OF CARE
Problem: Perioperative Period (Adult)  Goal: Signs and Symptoms of Listed Potential Problems Will be Absent or Manageable (Perioperative Period)  Outcome: Ongoing (interventions implemented as appropriate)    06/26/17 0186   Perioperative Period   Problems Assessed (Perioperative Period) pain;infection;perioperative injury   Problems Present (Perioperative Period) none

## 2017-06-26 NOTE — ANESTHESIA PREPROCEDURE EVALUATION
Anesthesia Evaluation     Patient summary reviewed   no history of anesthetic complications:  NPO Solid Status: > 8 hours  NPO Liquid Status: > 2 hours     Airway   Mallampati: III  TM distance: >3 FB  Neck ROM: full  possible difficult intubation  Dental - normal exam     Pulmonary - normal exam   (+) sleep apnea on CPAP,   Cardiovascular - normal exam    ECG reviewed  Rhythm: regular  Rate: normal    (+) hypertension,       Neuro/Psych  GI/Hepatic/Renal/Endo    (+) morbid obesity,     Musculoskeletal     Abdominal    Substance History      OB/GYN          Other   (+) arthritis         Phys Exam Other: Widely spaced incisors                                Anesthesia Plan    ASA 3     general   (Adductor canal block for postop)  intravenous induction   Anesthetic plan and risks discussed with patient.

## 2017-06-27 VITALS
DIASTOLIC BLOOD PRESSURE: 73 MMHG | SYSTOLIC BLOOD PRESSURE: 142 MMHG | TEMPERATURE: 98.2 F | HEIGHT: 69 IN | HEART RATE: 96 BPM | OXYGEN SATURATION: 91 % | RESPIRATION RATE: 16 BRPM | WEIGHT: 266.7 LBS | BODY MASS INDEX: 39.5 KG/M2

## 2017-06-27 LAB
HCT VFR BLD AUTO: 48.7 % (ref 40.4–52.2)
HGB BLD-MCNC: 16.4 G/DL (ref 13.7–17.6)

## 2017-06-27 PROCEDURE — 25010000002 KETOROLAC TROMETHAMINE PER 15 MG: Performed by: ORTHOPAEDIC SURGERY

## 2017-06-27 PROCEDURE — 25010000003 CEFAZOLIN IN DEXTROSE 2-4 GM/100ML-% SOLUTION: Performed by: ORTHOPAEDIC SURGERY

## 2017-06-27 PROCEDURE — 85018 HEMOGLOBIN: CPT | Performed by: ORTHOPAEDIC SURGERY

## 2017-06-27 PROCEDURE — 85014 HEMATOCRIT: CPT | Performed by: ORTHOPAEDIC SURGERY

## 2017-06-27 PROCEDURE — 97110 THERAPEUTIC EXERCISES: CPT

## 2017-06-27 PROCEDURE — 97161 PT EVAL LOW COMPLEX 20 MIN: CPT

## 2017-06-27 RX ORDER — PSEUDOEPHEDRINE HCL 30 MG
100 TABLET ORAL 2 TIMES DAILY
Qty: 25 CAPSULE | Refills: 0 | Status: SHIPPED | OUTPATIENT
Start: 2017-06-27 | End: 2017-07-11

## 2017-06-27 RX ORDER — ONDANSETRON 4 MG/1
4 TABLET, FILM COATED ORAL EVERY 8 HOURS PRN
Qty: 10 TABLET | Refills: 0 | Status: SHIPPED | OUTPATIENT
Start: 2017-06-27 | End: 2017-08-25

## 2017-06-27 RX ORDER — HYDROCODONE BITARTRATE AND ACETAMINOPHEN 7.5; 325 MG/1; MG/1
TABLET ORAL
Qty: 100 TABLET | Refills: 0 | Status: SHIPPED | OUTPATIENT
Start: 2017-06-27 | End: 2017-07-14 | Stop reason: SDUPTHER

## 2017-06-27 RX ORDER — MELOXICAM 15 MG/1
15 TABLET ORAL DAILY
Qty: 30 TABLET | Refills: 0 | Status: SHIPPED | OUTPATIENT
Start: 2017-06-27 | End: 2017-07-26

## 2017-06-27 RX ORDER — POLYETHYLENE GLYCOL 3350 17 G/17G
17 POWDER, FOR SOLUTION ORAL 2 TIMES DAILY
Qty: 476 G | Refills: 0 | Status: SHIPPED | OUTPATIENT
Start: 2017-06-27 | End: 2017-07-11

## 2017-06-27 RX ADMIN — HYDROCODONE BITARTRATE AND ACETAMINOPHEN 1 TABLET: 7.5; 325 TABLET ORAL at 06:06

## 2017-06-27 RX ADMIN — ASPIRIN 325 MG: 325 TABLET, DELAYED RELEASE ORAL at 08:07

## 2017-06-27 RX ADMIN — HYDROCODONE BITARTRATE AND ACETAMINOPHEN 1 TABLET: 7.5; 325 TABLET ORAL at 01:43

## 2017-06-27 RX ADMIN — POLYETHYLENE GLYCOL 3350 17 G: 17 POWDER, FOR SOLUTION ORAL at 08:07

## 2017-06-27 RX ADMIN — MUPIROCIN: 20 OINTMENT TOPICAL at 08:07

## 2017-06-27 RX ADMIN — DOCUSATE SODIUM 100 MG: 100 CAPSULE, LIQUID FILLED ORAL at 08:07

## 2017-06-27 RX ADMIN — KETOROLAC TROMETHAMINE 30 MG: 30 INJECTION, SOLUTION INTRAMUSCULAR at 01:12

## 2017-06-27 RX ADMIN — HYDROCODONE BITARTRATE AND ACETAMINOPHEN 1 TABLET: 7.5; 325 TABLET ORAL at 09:31

## 2017-06-27 RX ADMIN — PANTOPRAZOLE SODIUM 40 MG: 40 TABLET, DELAYED RELEASE ORAL at 06:06

## 2017-06-27 RX ADMIN — LAMOTRIGINE 200 MG: 100 TABLET ORAL at 08:07

## 2017-06-27 RX ADMIN — MELOXICAM 15 MG: 15 TABLET ORAL at 08:07

## 2017-06-27 RX ADMIN — CEFAZOLIN SODIUM 2 G: 2 INJECTION, SOLUTION INTRAVENOUS at 03:23

## 2017-06-27 RX ADMIN — KETOROLAC TROMETHAMINE 30 MG: 30 INJECTION, SOLUTION INTRAMUSCULAR at 08:07

## 2017-06-27 RX ADMIN — LISINOPRIL 20 MG: 20 TABLET ORAL at 08:07

## 2017-06-27 NOTE — THERAPY DISCHARGE NOTE
Acute Care - Physical Therapy Initial Eval/Discharge  [facility]     Patient Name: Anthony Brown  : 1965  MRN: [id number]  Today's Date: 2017   Onset of Illness/Injury or Date of Surgery Date: 17  Date of Referral to PT: 17  Referring Physician: Dieter      Admit Date: 2017    Visit Dx:    ICD-10-CM ICD-9-CM   1. Difficulty walking R26.2 719.7   2. Primary osteoarthritis of right knee M17.11 715.16     Patient Active Problem List   Diagnosis   • Primary osteoarthritis of right knee     Past Medical History:   Diagnosis Date   • Arthritis     bialteral knees   • Depression    • High cholesterol    • History of chest pain     6 YEARS AGO, HAD STRESS TEST NORMAL   • Hypertension    • Low testosterone level in male     HAS TESTOSTERONE PELLETS IMPLANTED   • Sinusitis    • Sleep apnea with use of continuous positive airway pressure (CPAP)      Past Surgical History:   Procedure Laterality Date   • HERNIA REPAIR     • KNEE SURGERY Right    • [place] TOTAL KNEE ARTHROPLASTY N/A 2017    Procedure: RIGHT TOTAL KNEE ARTHROPLASTY, LEFT KNEE INJECTED WITH CORTISONE;  Surgeon: Bradford Torres MD;  Location: [facility];  Service: Orthopedics   • TONSILLECTOMY  1970          PT ASSESSMENT (last 72 hours)      PT Evaluation       17 0840 17 0249    Rehab Evaluation    Document Type evaluation  -EJ     Subjective Information agree to therapy;no complaints  -EJ     Patient Effort, Rehab Treatment excellent  -EJ     General Information    Onset of Illness/Injury or Date of Surgery Date 17  -EJ     Referring Physician Dieter  -EJ     General Observations reclined in chair, no acute distress  -EJ     Prior Level of Function independent:;all household mobility;community mobility;ADL's  -EJ     Equipment Currently Used at Home none  -EJ none  -BM    Plans/Goals Discussed With patient;agreed upon  -EJ     Barriers to Rehab none identified  -EJ     Living Environment    Lives With  spouse  -EJ     Living Arrangements house  -EJ     Home Accessibility no concerns  -EJ     Transportation Available  car  -BM    Clinical Impression    Date of Referral to PT 06/27/17  -EJ     PT Diagnosis s/p R TKR  -EJ     Patient/Family Goals Statement Pt plans home today  -EJ     Criteria for Skilled Therapeutic Interventions Met --   DC home  -EJ     Pain Assessment    Pain Assessment No/denies pain  -EJ     Cognitive Assessment/Intervention    Current Cognitive/Communication Assessment functional  -EJ     Orientation Status oriented x 4  -EJ     Follows Commands/Answers Questions 100% of the time  -EJ     Personal Safety WNL/WFL  -EJ     Personal Safety Interventions fall prevention program maintained;gait belt;supervised activity;nonskid shoes/slippers when out of bed  -EJ     ROM (Range of Motion)    General ROM no range of motion deficits identified   x R knee  -EJ     MMT (Manual Muscle Testing)    General MMT Assessment no strength deficits identified   x R knee  -EJ     Mobility Assessment/Training    Extremity Weight-Bearing Status right lower extremity  -EJ     Right Lower Extremity Weight-Bearing weight-bearing as tolerated  -EJ     Bed Mobility, Assessment/Treatment    Bed Mobility, Comment up in chair  -EJ     Transfer Assessment/Treatment    Transfers, Sit-Stand [place] independent  -EJ     Transfers, Stand-Sit [place] independent  -EJ     Transfers, Sit-Stand-Sit, Assist Device rolling walker  -EJ     Gait Assessment/Treatment    Gait, [place] Level verbal cues required;supervision required  -EJ     Gait, Assistive Device rolling walker  -EJ     Gait, Distance (Feet) 300  -EJ     Gait, Gait Deviations antalgic;sanam decreased;step length decreased  -EJ     Stairs Assessment/Treatment    Number of Stairs 4  -EJ     Stairs, Handrail Location both sides  -EJ     Stairs, [place] Level supervision required;verbal cues required  -EJ     Stairs, Technique Used step to step  (descending);step to step (ascending)  -EJ     Therapy Exercises    Exercise Protocols total knee  -EJ     Total Knee Exercises right:;10 reps;completed protocol  -EJ     Positioning and Restraints    Pre-Treatment Position sitting in chair/recliner  -EJ     Post Treatment Position chair  -EJ     In Chair notified nsg;reclined;call light within reach;encouraged to call for assist  -EJ       06/26/17 0928       General Information    Equipment Currently Used at Home none  -JV     Living Environment    Lives With spouse  -JV     Living Arrangements house  -JV     Home Accessibility no concerns  -JV     Type of Financial/Environmental Concern none  -JV     Transportation Available car;family or friend will provide  -JV       User Key  (r) = Recorded By, (t) = Taken By, (c) = Cosigned By    Initials Name Provider Type    SANDY Gray, RN Registered Nurse    ТАТЬЯНА Sydney Gongora, RN Registered Nurse    DANIE Yang, PT Physical Therapist              PT Recommendation and Plan  Anticipated Discharge Disposition: home with assist, home with home health  PT Frequency: evaluation only  Plan of Care Review  Plan Of Care Reviewed With: patient  Progress: improving  Outcome Summary/Follow up Plan: Pt doing excellent with all mobility. Pt able to negotiate stairs without concern and able to complete all exercises. Plans to d/c home today.              Outcome Measures       06/27/17 0900          How much help from another person do you currently need...    Turning from your back to your side while in flat bed without using bedrails? 4  -EJ      Moving from lying on back to sitting on the side of a flat bed without bedrails? 4  -EJ      Moving to and from a bed to a chair (including a wheelchair)? 4  -EJ      Standing up from a chair using your arms (e.g., wheelchair, bedside chair)? 4  -EJ      Climbing 3-5 steps with a railing? 3  -EJ      To walk in hospital room? 3  -EJ      AM-PAC 6 Clicks Score  22  -EJ      Functional Assessment    Outcome Measure Options AM-PAC 6 Clicks Basic Mobility (PT)  -EJ        User Key  (r) = Recorded By, (t) = Taken By, (c) = Cosigned By    Initials Name Provider Type    DANIE Yang PT Physical Therapist           Time Calculation:         PT Charges       06/27/17 0909          Time Calculation    Start Time 0842  -EJ      Stop Time 0905  -EJ      Time Calculation (min) 23 min  -EJ      PT Received On 06/27/17  -EJ        User Key  (r) = Recorded By, (t) = Taken By, (c) = Cosigned By    Initials Name Provider Type    DANIE Yang PT Physical Therapist          Therapy Charges for Today     Code Description Service Date Service Provider Modifiers Qty    15645654506 HC PT EVAL LOW COMPLEXITY 1 6/27/2017 Geovanna Yang, PT GP 1    95560227360 HC PT THER PROC EA 15 MIN 6/27/2017 Geovanna Yang, PT GP 1          PT G-Codes  Outcome Measure Options: AM-PAC 6 Clicks Basic Mobility (PT)    PT Discharge Summary  Anticipated Discharge Disposition: home with assist, home with home health  Reason for Discharge: Discharge from facility  Discharge Destination: Home with assist, Home with home health    Geovanna Yang, PT  6/27/2017

## 2017-06-27 NOTE — DISCHARGE SUMMARY
Patient Name: Anthony Brown  Patient YOB: 1965    Date of Admission:  6/26/2017  Date of Discharge:  6/27/2017  Discharge Diagnosis: [place] TOTAL KNEE ARTHROPLASTY [31633] (TOTAL KNEE ARTHROPLASTY)  Presenting Problem/History of Present Illness: Primary osteoarthritis of right knee [M17.11]  Primary osteoarthritis of right knee [M17.11]  Admitting Physician: Dr Bradford Torres  Consults:   Consults     No orders found for last 30 day(s).          DETAILS OF HOSPITAL STAY:  Patient is a 51 y.o. male was admitted to the floor following the above procedure and underwent an uncomplicated hospital stay.  Patient did well with physical therapy and was ambulating well without problems.  On the day of discharge the wound was clean, dry and intact and calf was soft and non tender and Homans sign was negative.  Patient was tolerating   Diet Instructions     Diet:       Diet Texture / Consistency:  Regular              without problems.  Patient will be discharged home.    Condition on Discharge:  Stable    Vital Signs  Temp:  [97.9 °F (36.6 °C)-98.7 °F (37.1 °C)] 98 °F (36.7 °C)  Heart Rate:  [] 97  Resp:  [16-20] 16  BP: (121-167)/() 121/69    LABS:      Admission on 06/26/2017   Component Date Value Ref Range Status   • Hemoglobin 06/27/2017 16.4  13.7 - 17.6 g/dL Final   • Hematocrit 06/27/2017 48.7  40.4 - 52.2 % Final       Xr Knee 1 Or 2 View Right    Result Date: 6/26/2017  Narrative: 2 VIEW PORTABLE RIGHT KNEE  HISTORY: Knee replacement for osteoarthritis  The patient has had recent total knee replacement and alignment appears satisfactory.  This report was finalized on 6/26/2017 3:48 PM by Dr. Mark Dixon MD.      Xr Knee 3 View Right    Impression: Ordering physician's impression is located in the Encounter Note dated 06/22/17.     Xr Joint Survey Ap 2+ Joints    Impression: Ordering physician's impression is located in the Encounter Note dated 06/22/17.       Discharge Medications   English,  Anthony OCONNOR   Home Medication Instructions JUVENAL:[id number]    Printed on:06/27/17 0658   Medication Information                      aspirin  MG EC tablet  Take 1 tablet by mouth 2 (Two) Times a Day for 30 days.             Cholecalciferol (VITAMIN D3) 5000 UNITS capsule capsule  Take 5,000 Units by mouth Daily. PT TO HOLD MED PRIOR TO OR             Cyanocobalamin (B-12) 5000 MCG sublingual tablet  Place 5,000 mcg under the tongue Daily.             docusate sodium 100 MG capsule  Take 100 mg by mouth 2 (Two) Times a Day for 14 days.             HYDROcodone-acetaminophen (NORCO) 7.5-325 MG per tablet  1-2 po q 4-6 hr prn pain             lamoTRIgine (LaMICtal) 200 MG tablet  Take 200 mg by mouth Daily.             lisinopril (PRINIVIL,ZESTRIL) 20 MG tablet  Take 20 mg by mouth Daily.             meloxicam (MOBIC) 15 MG tablet  Take 1 tablet by mouth Daily for 29 days. Indications: Joint Damage causing Pain and Loss of Function             omeprazole (prilOSEC) 10 MG capsule  Take 20 mg by mouth Daily.             ondansetron (ZOFRAN) 4 MG tablet  Take 1 tablet by mouth Every 8 (Eight) Hours As Needed for Nausea or Vomiting for up to 10 doses.             polyethylene glycol (MIRALAX) packet  Take 17 g by mouth 2 (Two) Times a Day for 14 days.             Probiotic Product (PROBIOTIC PO)  Take 1 capsule by mouth Daily. PT TO HOLD MED PRIOR TO OR             Testosterone 200 MG pellet  by Implant route. 9 PELLETS IN BUTTOCK                 Activity at Discharge:     Discharge Instructions: Patient is to continue with physical therapy exercises daily and continue working with the physical therapist as ordered. Patient may weight bear as tolerated. Continue DANIEL hose daily and ice regularly. Patient instructed on frequent calf pumping exercises.  Patient also instructed on incentive spirometer during hospitalization and encouraged to continue to use at home regularly. Patient may shower on POD#5. The wound should be  gently cleaned with antibacterial soap then allowed to dry and covered with a dry sterile dressing. The wound should be covered at all times except while showering.  Patient may change dressings daily and prn using sterile 4x4 and paper tape, and should call if any unusual drainage, redness or swelling. Follow up appointment in 2 weeks - patient to call the office at [phone] to schedule.  Patient will be discharged on aspirin 325mg BID x 2weeks, then daily x 4weeks    Discharge Diagnosis:    Patient Active Problem List   Diagnosis   • Primary osteoarthritis of right knee       Follow-up Appointments  No future appointments.  Additional Instructions for the Follow-ups that You Need to Schedule     Referral to home health    As directed    PT to see for Home PT protocol. Daily for first week then 3x/ wk for second week. Staples to be removed at f/u appointment in 2 weeks.   Face to Face Visit Date:  6/27/2017   Follow-up Provider for Plan of Care?:  I will be treating the patient on an ongoing basis.  Please send me the Plan of Care for signature.   Follow-up Provider:  ERNESTO TORRES   Reason/Clinical Findings:  post op knee replacement   Describe mobility limitations that make leaving home difficult:  pain, swelling, weakness, limited mobility, difficulty ambulating without assistive device   Nursing/Therapeutic Services Requested:   Skilled Nursing  Physicial Therapy      Skilled nursing orders:  Wound care dressing/changes                    Ernesto Torres MD  06/27/17  6:58 AM

## 2017-06-27 NOTE — PLAN OF CARE
Problem: Patient Care Overview (Adult)  Goal: Plan of Care Review  Outcome: Ongoing (interventions implemented as appropriate)    06/27/17 0249   Coping/Psychosocial Response Interventions   Plan Of Care Reviewed With patient   Patient Care Overview   Progress improving   Outcome Evaluation   Outcome Summary/Follow up Plan patient ambulating with assistance to bathroom and in hallway, pain controlled with oral pain medication at this time, patient educated on b/p monitoring       Goal: Adult Individualization and Mutuality  Outcome: Ongoing (interventions implemented as appropriate)  Goal: Discharge Needs Assessment  Outcome: Ongoing (interventions implemented as appropriate)    Problem: Perioperative Period (Adult)  Goal: Signs and Symptoms of Listed Potential Problems Will be Absent or Manageable (Perioperative Period)  Outcome: Ongoing (interventions implemented as appropriate)    Problem: Knee Replacement, Total (Adult)  Goal: Signs and Symptoms of Listed Potential Problems Will be Absent or Manageable (Knee Replacement, Total)  Outcome: Ongoing (interventions implemented as appropriate)    Problem: Fall Risk (Adult)  Goal: Absence of Falls  Outcome: Ongoing (interventions implemented as appropriate)

## 2017-06-27 NOTE — PLAN OF CARE
Problem: Patient Care Overview (Adult)  Goal: Plan of Care Review  Outcome: Outcome(s) achieved Date Met:  06/27/17 06/27/17 0908   Coping/Psychosocial Response Interventions   Plan Of Care Reviewed With patient   Patient Care Overview   Progress improving   Outcome Evaluation   Outcome Summary/Follow up Plan Pt doing excellent with all mobility, ambulating 300 feet. Pt able to negotiate stairs without concern and able to complete all exercises. Plans to d/c home today.

## 2017-07-03 ENCOUNTER — TELEPHONE (OUTPATIENT)
Dept: ORTHOPEDIC SURGERY | Facility: CLINIC | Age: 52
End: 2017-07-03

## 2017-07-14 ENCOUNTER — OFFICE VISIT (OUTPATIENT)
Dept: ORTHOPEDIC SURGERY | Facility: CLINIC | Age: 52
End: 2017-07-14

## 2017-07-14 VITALS — WEIGHT: 265 LBS | HEIGHT: 69 IN | TEMPERATURE: 98.1 F | BODY MASS INDEX: 39.25 KG/M2

## 2017-07-14 DIAGNOSIS — Z98.890 S/P KNEE SURGERY: Primary | ICD-10-CM

## 2017-07-14 PROCEDURE — 99024 POSTOP FOLLOW-UP VISIT: CPT | Performed by: NURSE PRACTITIONER

## 2017-07-14 RX ORDER — HYDROCODONE BITARTRATE AND ACETAMINOPHEN 7.5; 325 MG/1; MG/1
TABLET ORAL
Qty: 30 TABLET | Refills: 0 | Status: SHIPPED | OUTPATIENT
Start: 2017-07-14 | End: 2017-08-25

## 2017-07-14 RX ORDER — ANASTROZOLE 1 MG/1
TABLET ORAL
Refills: 4 | COMMUNITY
Start: 2017-06-20 | End: 2019-05-31

## 2017-07-14 NOTE — PROGRESS NOTES
Anthony Brown : 1965 MRN: [id number] DATE: 2017    DIAGNOSIS: 2 week follow up right total knee      SUBJECTIVE:Patient returns today for 2 week follow up of right total knee replacement. Patient reports doing well with no unusual complaints. Appears to be progressing appropriately.    OBJECTIVE:   Exam:. The incision is healing appropriately. No sign of infection. Range of motion is progressing as expected. The calf is soft and nontender with a negative Homans sign.    ASSESSMENT: 2 week status post right knee replacement.    PLAN: 1) Staples removed and steri strips applied   2) Order given for PT   3) Discontinue DANIEL hose   4) Continue ice PRN   5) aspirin 325 mg orally every day for 1 month   6) Follow up in 6 weeks with repeat Xrays of right knee (3views)    Tram Puente, APRN  2017

## 2017-08-25 ENCOUNTER — OFFICE VISIT (OUTPATIENT)
Dept: ORTHOPEDIC SURGERY | Facility: CLINIC | Age: 52
End: 2017-08-25

## 2017-08-25 VITALS — TEMPERATURE: 98.5 F | HEIGHT: 68 IN | BODY MASS INDEX: 39.4 KG/M2 | WEIGHT: 260 LBS

## 2017-08-25 DIAGNOSIS — Z96.651 STATUS POST TOTAL RIGHT KNEE REPLACEMENT: Primary | ICD-10-CM

## 2017-08-25 PROCEDURE — 99024 POSTOP FOLLOW-UP VISIT: CPT | Performed by: NURSE PRACTITIONER

## 2017-08-25 PROCEDURE — 73562 X-RAY EXAM OF KNEE 3: CPT | Performed by: NURSE PRACTITIONER

## 2017-08-25 RX ORDER — FENOFIBRATE 160 MG/1
TABLET ORAL
Refills: 0 | COMMUNITY
Start: 2017-08-03 | End: 2019-05-31

## 2017-08-25 NOTE — PROGRESS NOTES
Anthony Brown : 1965 MRN: [id number] DATE: 2017    DIAGNOSIS: 8 week follow up right total knee      SUBJECTIVE:Patient returns today for 8 week follow up of right total knee replacement. Patient reports doing well with no unusual complaints. Appears to be progressing appropriately.    OBJECTIVE:   Exam:. The incision is well healed. No sign of infection. Range of motion is measured at 0 to 122. The calf is soft and nontender with a negative Homans sign. Strength is progressing and the patient is ambulating appropriately.    DIAGNOSTIC STUDIES  Xrays: 3 views of the right knee (AP, lateral, and sunrise) were ordered and reviewed for evaluation of recent knee replacement. They demonstrate a well positioned, well aligned knee replacement without complicating factors noted. In comparison with previous films there has been no change.    ASSESSMENT: 8 week status post right knee replacement.    PLAN: 1) Continue with PT exercises as prescribed   2) Follow up in 6 months to discuss left total knee replacement.    Tram Puente, APRN  2017

## 2017-09-29 ENCOUNTER — CLINICAL SUPPORT (OUTPATIENT)
Dept: ORTHOPEDIC SURGERY | Facility: CLINIC | Age: 52
End: 2017-09-29

## 2017-09-29 VITALS — TEMPERATURE: 98.1 F | WEIGHT: 261 LBS | BODY MASS INDEX: 38.66 KG/M2 | HEIGHT: 69 IN

## 2017-09-29 DIAGNOSIS — M17.12 PRIMARY OSTEOARTHRITIS OF LEFT KNEE: Primary | ICD-10-CM

## 2017-09-29 PROCEDURE — 20610 DRAIN/INJ JOINT/BURSA W/O US: CPT | Performed by: NURSE PRACTITIONER

## 2017-09-29 RX ORDER — BUPIVACAINE HYDROCHLORIDE 5 MG/ML
2 INJECTION, SOLUTION EPIDURAL; INTRACAUDAL
Status: COMPLETED | OUTPATIENT
Start: 2017-09-29 | End: 2017-09-29

## 2017-09-29 RX ORDER — METHYLPREDNISOLONE ACETATE 80 MG/ML
80 INJECTION, SUSPENSION INTRA-ARTICULAR; INTRALESIONAL; INTRAMUSCULAR; SOFT TISSUE
Status: COMPLETED | OUTPATIENT
Start: 2017-09-29 | End: 2017-09-29

## 2017-09-29 RX ADMIN — METHYLPREDNISOLONE ACETATE 80 MG: 80 INJECTION, SUSPENSION INTRA-ARTICULAR; INTRALESIONAL; INTRAMUSCULAR; SOFT TISSUE at 16:09

## 2017-09-29 RX ADMIN — BUPIVACAINE HYDROCHLORIDE 2 ML: 5 INJECTION, SOLUTION EPIDURAL; INTRACAUDAL at 16:09

## 2017-09-29 NOTE — PROGRESS NOTES
9/29/2017    Anthony Brown is here today for worsening knee pain. Pt has undergone injection of the knee in the past with good resolution of symptoms. Pt is requesting a repeat injection.     KNEE Injection Procedure Note:    Large Joint Arthrocentesis  Date/Time: 9/29/2017 4:09 PM  Consent given by: patient  Site marked: site marked  Timeout: Immediately prior to procedure a time out was called to verify the correct patient, procedure, equipment, support staff and site/side marked as required   Supporting Documentation  Indications: pain and joint swelling   Procedure Details  Location: knee - L knee  Preparation: Patient was prepped and draped in the usual sterile fashion  Needle size: 22 G  Approach: anterolateral  Medications administered: 2 mL bupivacaine (PF) 0.5 %; 80 mg methylPREDNISolone acetate 80 MG/ML  Patient tolerance: patient tolerated the procedure well with no immediate complications          At the conclusion of the injection I discussed the importance of continued quad strengthening exercises on a daily basis. I will see the patient back if the symptoms should fail to improve or worsen.    Tram Puente APRN  9/29/2017

## 2018-01-12 ENCOUNTER — OFFICE VISIT (OUTPATIENT)
Dept: ORTHOPEDIC SURGERY | Facility: CLINIC | Age: 53
End: 2018-01-12

## 2018-01-12 VITALS — WEIGHT: 274 LBS | HEIGHT: 69 IN | TEMPERATURE: 97.2 F | BODY MASS INDEX: 40.58 KG/M2

## 2018-01-12 DIAGNOSIS — Z96.651 HISTORY OF TOTAL KNEE ARTHROPLASTY, RIGHT: Primary | ICD-10-CM

## 2018-01-12 PROCEDURE — 73562 X-RAY EXAM OF KNEE 3: CPT | Performed by: ORTHOPAEDIC SURGERY

## 2018-01-12 PROCEDURE — 99213 OFFICE O/P EST LOW 20 MIN: CPT | Performed by: ORTHOPAEDIC SURGERY

## 2018-01-12 RX ORDER — MELOXICAM 15 MG/1
TABLET ORAL
Qty: 60 TABLET | Refills: 3 | Status: SHIPPED | OUTPATIENT
Start: 2018-01-12 | End: 2019-05-31

## 2018-01-12 RX ORDER — AMOXICILLIN 500 MG/1
CAPSULE ORAL
Refills: 3 | COMMUNITY
Start: 2017-12-29 | End: 2021-08-10

## 2018-01-12 NOTE — PROGRESS NOTES
Patient: Anthony Brown  YOB: 1965 52 y.o. male  Medical Record Number: [id number]    Chief Complaints:   Chief Complaint   Patient presents with   • Right Knee - Follow-up, Pain       History of Present Illness:Anthony Brown is a 52 y.o. male who presents for follow-up of  Right total knee replacement is about 6 or 7 months out.  He's having some soreness and achiness within the posterior aspect of the right knee especially after he walks such as from his car to his office which is about a quarter mile.  He also has known arthritis of the left knee.  Unfortunately since we replace his right knees put on about 10 pounds over the holidays.    Allergies: No Known Allergies    Medications:   Current Outpatient Prescriptions   Medication Sig Dispense Refill   • amoxicillin (AMOXIL) 500 MG capsule TAKE 4 CAPSULES AT ONCE PRIOR TO DENTAL TREATMENT  3   • anastrozole (ARIMIDEX) 1 MG tablet TAKE 1/2 TABLET BY MOUTH TWICE EACH WEEK  4   • Cholecalciferol (VITAMIN D3) 5000 UNITS capsule capsule Take 5,000 Units by mouth Daily. PT TO HOLD MED PRIOR TO OR     • Cyanocobalamin (B-12) 5000 MCG sublingual tablet Place 5,000 mcg under the tongue Daily.     • fenofibrate 160 MG tablet TAKE 1 TABLET BY ORAL ROUTE EVERY DAY  0   • lamoTRIgine (LaMICtal) 200 MG tablet Take 200 mg by mouth Daily.     • lisinopril (PRINIVIL,ZESTRIL) 20 MG tablet Take 20 mg by mouth Daily.     • meloxicam (MOBIC) 15 MG tablet 1 PO Daily with food. 60 tablet 3   • omeprazole (prilOSEC) 10 MG capsule Take 20 mg by mouth Daily.     • Probiotic Product (PROBIOTIC PO) Take 1 capsule by mouth Daily. PT TO HOLD MED PRIOR TO OR     • Testosterone 200 MG pellet by Implant route. 9 PELLETS IN BUTTOCK       No current facility-administered medications for this visit.          The following portions of the patient's history were reviewed and updated as appropriate: allergies, current medications, past family history, past medical history, past  "social history, past surgical history and problem list.    Review of Systems:   A 14 point review of systems was performed. All systems negative except pertinent positives/negative listed in HPI above    Physical Exam:   Vitals:    01/12/18 1417   Temp: 97.2 °F (36.2 °C)   Weight: 124 kg (274 lb)   Height: 175.3 cm (69\")       General: A and O x 3, ASA, NAD    SCLERA:    Normal    DENTITION:   Normal  Right knee exam shows a well-healed incision.  There is no joint effusion there is no instability in extension or flexion tracks midline as far as the patella was concern and the range of motion is roughly 0-120.  There is varus alignment of the left knee does have moderate arthritis    Radiology:  Xrays 3views right knee (ap,lateral, sunrise) were ordered and reviewed for evaluation of knee pain demonstratinga well positioned knee replacement without evidence of wear, loosening or osteolysis  todays xrays were compared to previous xrays and demonstrate no change    Assessment/Plan:  Right total knee with some diffuse soreness.  I think it's a combination of being about 6 or 7 months out from surgery gaining some weight and in general tendon irritation.  I've instructed him on importance of weight loss I'll place him on meloxicam and I like to see him back in June.  At this point I think it's a little too early to start talking left knee replacement.  We need to get his right knee feeling better and optimize his body in the talk about that.  "

## 2019-05-31 ENCOUNTER — OFFICE VISIT (OUTPATIENT)
Dept: ORTHOPEDIC SURGERY | Facility: CLINIC | Age: 54
End: 2019-05-31

## 2019-05-31 VITALS — WEIGHT: 267.7 LBS | BODY MASS INDEX: 39.65 KG/M2 | TEMPERATURE: 99.6 F | HEIGHT: 69 IN

## 2019-05-31 DIAGNOSIS — Z96.651 STATUS POST TOTAL RIGHT KNEE REPLACEMENT: Primary | ICD-10-CM

## 2019-05-31 PROCEDURE — 73562 X-RAY EXAM OF KNEE 3: CPT | Performed by: ORTHOPAEDIC SURGERY

## 2019-05-31 PROCEDURE — 99213 OFFICE O/P EST LOW 20 MIN: CPT | Performed by: ORTHOPAEDIC SURGERY

## 2019-05-31 RX ORDER — MELOXICAM 15 MG/1
TABLET ORAL
Qty: 90 TABLET | Refills: 3 | Status: SHIPPED | OUTPATIENT
Start: 2019-05-31 | End: 2020-05-28

## 2019-05-31 NOTE — PROGRESS NOTES
Patient: Anthony Brown  YOB: 1965 53 y.o. male  Medical Record Number: [id number]    Chief Complaints:   Chief Complaint   Patient presents with   • Right Knee - Follow-up, Pain       History of Present Illness:Anthony Brown is a 53 y.o. male who presents with right TKA - 2 years out. Having some soreness with biking which he does for work on a 4 wheel bike carrying around 300 pounds of equipment.  First few revolutions on the bike he has soreness around the anterior aspect of the knee.  Pain is fairly intermittent there are days when he has no pain in all the other days where he is very active and later that night or the next day may be sore    Allergies: No Known Allergies    Medications:   Current Outpatient Medications   Medication Sig Dispense Refill   • amoxicillin (AMOXIL) 500 MG capsule TAKE 4 CAPSULES AT ONCE PRIOR TO DENTAL TREATMENT  3   • Cholecalciferol (VITAMIN D3) 5000 UNITS capsule capsule Take 5,000 Units by mouth Daily. PT TO HOLD MED PRIOR TO OR     • Cyanocobalamin (B-12) 5000 MCG sublingual tablet Place 5,000 mcg under the tongue Daily.     • lamoTRIgine (LaMICtal) 200 MG tablet Take 200 mg by mouth Daily.     • lisinopril (PRINIVIL,ZESTRIL) 20 MG tablet Take 20 mg by mouth Daily.     • omeprazole (prilOSEC) 10 MG capsule Take 20 mg by mouth Daily.     • Probiotic Product (PROBIOTIC PO) Take 1 capsule by mouth Daily. PT TO HOLD MED PRIOR TO OR     • Testosterone 200 MG pellet by Implant route. 9 PELLETS IN BUTTOCK       No current facility-administered medications for this visit.          The following portions of the patient's history were reviewed and updated as appropriate: allergies, current medications, past family history, past medical history, past social history, past surgical history and problem list.    Review of Systems:   A 14 point review of systems was performed. All systems negative except pertinent positives/negative listed in HPI above    Physical Exam:  "  Vitals:    05/31/19 1644   Temp: 99.6 °F (37.6 °C)   TempSrc: Temporal   Weight: 121 kg (267 lb 11.2 oz)   Height: 175.3 cm (69\")   PainSc:   6   PainLoc: Knee       General: A and O x 3, ASA, NAD    SCLERA:    Normal    DENTITION:   Normal   Knee:  right    ALIGNMENT:     Neutral  ,   Patella  tracks  Midline without crepitance    GAIT:    Nonantalgic    SKIN:    Well healed midline incision, no erythema or fluctuance    RANGE OF MOTION:   0  -  120   DEG    STRENGTH:   5  / 5    LIGAMENTS:    No varus / valgus instability.   No  Flexion   instability.       DISTAL PULSES:    Paplable    DISTAL SENSATION :   Intact    LYMPHATICS:     No   lymphadenopathy    OTHER:     No   Effusion      Calf soft / nontender ,   Negative Ethel's sign              Radiology:  Xrays 3views right knee (ap,lateral, sunrise) were ordered and reviewed for evaluation of knee pain demonstrating a well positioned knee replacement without evidence of wear, loosening or osteolysis-there is possibly a subtle sclerotic line about the medial aspect of the tibial keel but I see no evidence of divergent lines or lucencies otherwise.  In comparison to previous films are essentially unchanged    Assessment/Plan: Intermittent discomfort right total knee especially when starting out biking in his first few revolutions.  I set a goal for 25 pounds weight loss which would put him at 245.  I have given a prescription for meloxicam to take intermittently as needed.  I counseled him on GI precautions.  He will avoid running jumping impact activities.  I will see him back in 6 months repeat x-ray sooner if he has worsening pain.  His pain does progress we will have to consider a bone scan      Bradford Torres MD  5/31/2019  "

## 2019-11-13 ENCOUNTER — OFFICE VISIT CONVERTED (OUTPATIENT)
Dept: FAMILY MEDICINE CLINIC | Age: 54
End: 2019-11-13
Attending: NURSE PRACTITIONER

## 2019-11-14 ENCOUNTER — HOSPITAL ENCOUNTER (OUTPATIENT)
Dept: PHYSICAL THERAPY | Facility: CLINIC | Age: 54
Setting detail: RECURRING SERIES
Discharge: HOME | End: 2019-12-20
Attending: NURSE PRACTITIONER

## 2019-11-22 ENCOUNTER — HOSPITAL ENCOUNTER (OUTPATIENT)
Dept: OTHER | Facility: HOSPITAL | Age: 54
Discharge: HOME | End: 2019-11-22
Attending: NURSE PRACTITIONER

## 2019-11-22 LAB
ALBUMIN SERPL-MCNC: 4.7 G/DL (ref 3.5–5)
ALBUMIN/GLOB SERPL: 1.6 {RATIO} (ref 1.4–2.6)
ALP SERPL-CCNC: 78 U/L (ref 56–119)
ALT SERPL-CCNC: 43 U/L (ref 10–40)
ANION GAP SERPL CALC-SCNC: 18 MMOL/L (ref 8–19)
AST SERPL-CCNC: 30 U/L (ref 15–50)
BASOPHILS # BLD MANUAL: 0.02 10*3/UL (ref 0–0.2)
BASOPHILS NFR BLD MANUAL: 0.4 % (ref 0–3)
BILIRUB SERPL-MCNC: 0.5 MG/DL (ref 0.2–1.3)
BUN SERPL-MCNC: 19 MG/DL (ref 5–25)
BUN/CREAT SERPL: 16 {RATIO} (ref 6–20)
CALCIUM SERPL-MCNC: 10.3 MG/DL (ref 8.7–10.4)
CHLORIDE SERPL-SCNC: 102 MMOL/L (ref 99–111)
CHOLEST SERPL-MCNC: 183 MG/DL (ref 107–200)
CHOLEST/HDLC SERPL: 6.8 {RATIO} (ref 3–6)
CONV CO2: 25 MMOL/L (ref 22–32)
CONV TOTAL PROTEIN: 7.6 G/DL (ref 6.3–8.2)
CREAT UR-MCNC: 1.21 MG/DL (ref 0.7–1.2)
DEPRECATED RDW RBC AUTO: 39.9 FL
EOSINOPHIL # BLD MANUAL: 0.11 10*3/UL (ref 0–0.7)
EOSINOPHIL NFR BLD MANUAL: 2.4 % (ref 0–7)
ERYTHROCYTE [DISTWIDTH] IN BLOOD BY AUTOMATED COUNT: 12.3 % (ref 11.5–14.5)
GFR SERPLBLD BASED ON 1.73 SQ M-ARVRAT: >60 ML/MIN/{1.73_M2}
GLOBULIN UR ELPH-MCNC: 2.9 G/DL (ref 2–3.5)
GLUCOSE SERPL-MCNC: 92 MG/DL (ref 70–99)
GRANS (ABSOLUTE): 2.4 10*3/UL (ref 2–8)
GRANS: 53.4 % (ref 30–85)
HBA1C MFR BLD: 15.5 G/DL (ref 14–18)
HCT VFR BLD AUTO: 44.4 % (ref 42–52)
HDLC SERPL-MCNC: 27 MG/DL (ref 40–60)
IMM GRANULOCYTES # BLD: 0.03 10*3/UL (ref 0–0.54)
IMM GRANULOCYTES NFR BLD: 0.7 % (ref 0–0.43)
LDLC SERPL CALC-MCNC: 108 MG/DL (ref 70–100)
LYMPHOCYTES # BLD MANUAL: 1.49 10*3/UL (ref 1–5)
LYMPHOCYTES NFR BLD MANUAL: 10 % (ref 3–10)
MCH RBC QN AUTO: 30.4 PG (ref 27–31)
MCHC RBC AUTO-ENTMCNC: 34.9 G/DL (ref 33–37)
MCV RBC AUTO: 87.1 FL (ref 80–96)
MONOCYTES # BLD AUTO: 0.45 10*3/UL (ref 0.2–1.2)
OSMOLALITY SERPL CALC.SUM OF ELEC: 294 MOSM/KG (ref 273–304)
PLATELET # BLD AUTO: 244 10*3/UL (ref 130–400)
PMV BLD AUTO: 10.5 FL (ref 7.4–10.4)
POTASSIUM SERPL-SCNC: 4.4 MMOL/L (ref 3.5–5.3)
RBC # BLD AUTO: 5.1 10*6/UL (ref 4.7–6.1)
SODIUM SERPL-SCNC: 141 MMOL/L (ref 135–147)
TRIGL SERPL-MCNC: 238 MG/DL (ref 40–150)
TSH SERPL-ACNC: 1.32 M[IU]/L (ref 0.27–4.2)
VARIANT LYMPHS NFR BLD MANUAL: 33.1 % (ref 20–45)
VLDLC SERPL-MCNC: 48 MG/DL (ref 5–37)
WBC # BLD AUTO: 4.5 10*3/UL (ref 4.8–10.8)

## 2019-12-19 ENCOUNTER — TELEPHONE (OUTPATIENT)
Dept: ORTHOPEDIC SURGERY | Facility: CLINIC | Age: 54
End: 2019-12-19

## 2019-12-19 NOTE — TELEPHONE ENCOUNTER
Left answering machine message advising patient he will be due annual f/u appt with RBB in June 2020 for right TKA (done June 2017). Advised to call office for appt./[place]

## 2019-12-19 NOTE — TELEPHONE ENCOUNTER
----- Message from Soo Doherty MA sent at 12/19/2019 12:27 PM EST -----  Regarding: RE: APPT CX  This is fine, please make sure patient schedules f/u for annual check up on his R Total Knee w/RBB.  His surgery was 6/26/17 so he will need to f/u June 2020.  Thanks.   ----- Message -----  From: Faby Castellano  Sent: 11/7/2019   1:17 PM EST  To: Bradford Torres MD  Subject: APPT CX                                          Wife cancelled f/u appt for his knee on 11/22. He is doing better now.

## 2019-12-27 ENCOUNTER — OFFICE VISIT CONVERTED (OUTPATIENT)
Dept: FAMILY MEDICINE CLINIC | Age: 54
End: 2019-12-27
Attending: NURSE PRACTITIONER

## 2020-01-08 ENCOUNTER — HOSPITAL ENCOUNTER (OUTPATIENT)
Dept: OTHER | Facility: HOSPITAL | Age: 55
Discharge: HOME | End: 2020-01-08
Attending: NURSE PRACTITIONER

## 2020-01-29 ENCOUNTER — OFFICE VISIT (OUTPATIENT)
Dept: PAIN MEDICINE | Facility: CLINIC | Age: 55
End: 2020-01-29

## 2020-01-29 ENCOUNTER — RESULTS ENCOUNTER (OUTPATIENT)
Dept: PAIN MEDICINE | Facility: CLINIC | Age: 55
End: 2020-01-29

## 2020-01-29 VITALS
BODY MASS INDEX: 41.56 KG/M2 | TEMPERATURE: 98 F | WEIGHT: 280.6 LBS | HEIGHT: 69 IN | OXYGEN SATURATION: 100 % | HEART RATE: 68 BPM | RESPIRATION RATE: 20 BRPM | SYSTOLIC BLOOD PRESSURE: 152 MMHG | DIASTOLIC BLOOD PRESSURE: 87 MMHG

## 2020-01-29 DIAGNOSIS — G89.29 OTHER CHRONIC PAIN: Primary | ICD-10-CM

## 2020-01-29 DIAGNOSIS — M51.36 DDD (DEGENERATIVE DISC DISEASE), LUMBAR: ICD-10-CM

## 2020-01-29 DIAGNOSIS — M54.16 LUMBAR RADICULOPATHY: ICD-10-CM

## 2020-01-29 DIAGNOSIS — G89.29 OTHER CHRONIC PAIN: ICD-10-CM

## 2020-01-29 PROBLEM — M51.369 DDD (DEGENERATIVE DISC DISEASE), LUMBAR: Status: ACTIVE | Noted: 2020-01-29

## 2020-01-29 LAB
POC AMPHETAMINES: NEGATIVE
POC BARBITURATES: NEGATIVE
POC BENZODIAZEPHINES: NEGATIVE
POC COCAINE: NEGATIVE
POC METHADONE: NEGATIVE
POC METHAMPHETAMINE SCREEN URINE: NEGATIVE
POC OPIATES: NEGATIVE
POC OXYCODONE: NEGATIVE
POC PHENCYCLIDINE: NEGATIVE
POC PROPOXYPHENE: NEGATIVE
POC THC: NEGATIVE
POC TRICYCLIC ANTIDEPRESSANTS: NEGATIVE

## 2020-01-29 PROCEDURE — 80305 DRUG TEST PRSMV DIR OPT OBS: CPT | Performed by: NURSE PRACTITIONER

## 2020-01-29 PROCEDURE — 99204 OFFICE O/P NEW MOD 45 MIN: CPT | Performed by: NURSE PRACTITIONER

## 2020-01-29 RX ORDER — FENOFIBRATE 160 MG/1
TABLET ORAL
COMMUNITY
Start: 2019-12-01 | End: 2021-08-10 | Stop reason: SDUPTHER

## 2020-01-29 RX ORDER — OMEPRAZOLE 20 MG/1
CAPSULE, DELAYED RELEASE ORAL
COMMUNITY
Start: 2019-12-27 | End: 2021-07-02

## 2020-01-29 NOTE — PROGRESS NOTES
"CHIEF COMPLAINT  New pt referred to our office by Mercedes Armstrong APRN for right leg pain. Pt currently an . Pt c/o bilat leg pain, hurts more on right leg in right thigh. Pt sts out of nowhere sometimes pt sts feels tingling, and burning, almost like a TENS unit is on his right thigh. Pt sts he experiences tingling after going to sleep, wakes him up at night. Pt sts right leg pain started 11/2019. Pt sts he had 3 lumbar epidurals in the past for sciatica pain which he sts improved his pain. Pt has been to PT x 6-8 weeks in 11/2019, pt sts PT did not improve his pain. Pt has not been taking any pain meds for the pain. Pt would like to discuss injections for right leg pain.     Subjective   Anthony Brown is a 54 y.o. male.   He presents to the office for evaluation of back pain. He was referred here by RAY Morrell (primary care).     Patient reports chronic low back pain for many years with exacerbation about every 3-4 years. Previous benefit in the past with lumbar epidural steroid injections, last were in 2016 at another pain management practice, unsure of the name.  Pain has become progressively worse over the past 3 months.  No benefit with recent round of physical therapy.  Referred here by PCP to consider interventional pain management options.      C/o back pain with radiation down the right anterolateral thigh. The pains is sharp, tingling, \"electrical\".  Pain today 2/10 in severity. Pain is aggravated by prolonged standing/walking.  Pain is improved with resting.  Taking Meloxicam for his knee OA which helps some with that but not much with the back/leg.      He works as an .  Lives at home with his wife.  He does not use any tobacco products.  Denies use of illicit substances.  Drinks about 3 beers a week.      History of Present Illness     PEG Assessment   What number best describes your pain on average in the past week?2  What number best describes how, during the past " week, pain has interfered with your enjoyment of life?1  What number best describes how, during the past week, pain has interfered with your general activity?  1      Current Outpatient Medications:   •  Cholecalciferol (VITAMIN D3) 5000 UNITS capsule capsule, Take 5,000 Units by mouth Daily. PT TO HOLD MED PRIOR TO OR, Disp: , Rfl:   •  Cyanocobalamin (B-12) 5000 MCG sublingual tablet, Place 5,000 mcg under the tongue Daily., Disp: , Rfl:   •  fenofibrate 160 MG tablet, , Disp: , Rfl:   •  lamoTRIgine (LaMICtal) 200 MG tablet, Take 200 mg by mouth Daily., Disp: , Rfl:   •  lisinopril (PRINIVIL,ZESTRIL) 20 MG tablet, Take 20 mg by mouth Daily., Disp: , Rfl:   •  meloxicam (MOBIC) 15 MG tablet, 1 PO Daily with food., Disp: 90 tablet, Rfl: 3  •  omeprazole (prilOSEC) 10 MG capsule, Take 20 mg by mouth Daily., Disp: , Rfl:   •  Probiotic Product (PROBIOTIC PO), Take 1 capsule by mouth Daily. PT TO HOLD MED PRIOR TO OR, Disp: , Rfl:   •  amoxicillin (AMOXIL) 500 MG capsule, TAKE 4 CAPSULES AT ONCE PRIOR TO DENTAL TREATMENT, Disp: , Rfl: 3  •  omeprazole (priLOSEC) 20 MG capsule, , Disp: , Rfl:   •  Testosterone 200 MG pellet, by Implant route. 9 PELLETS IN BUTTOCK, Disp: , Rfl:     The following portions of the patient's history were reviewed and updated as appropriate: allergies, current medications, past family history, past medical history, past social history, past surgical history and problem list.    REVIEW OF PERTINENT MEDICAL DATA  Patient referred to our practice by RAY Morrell who is his primary care provider.  Referred here for low back pain.  Pain radiates to the right anterior thigh.  Previous benefit with epidural steroid injections last of which were in 2016.               GABRIEL = 2    Review of Systems   Constitutional: Positive for fatigue (occ). Negative for activity change and fever.   HENT: Negative for congestion.    Eyes: Negative for visual disturbance.   Respiratory: Negative for  "shortness of breath.    Cardiovascular: Negative for chest pain.   Gastrointestinal: Positive for diarrhea (occ). Negative for constipation, nausea and vomiting.   Endocrine: Negative for polyuria.   Genitourinary: Negative for difficulty urinating.   Musculoskeletal: Positive for arthralgias (right leg). Negative for gait problem.   Allergic/Immunologic: Negative for immunocompromised state.   Neurological: Negative for dizziness, weakness, light-headedness, numbness and headaches.   Psychiatric/Behavioral: Positive for agitation and sleep disturbance (occ). Negative for suicidal ideas. The patient is not nervous/anxious.      Vitals:    01/29/20 0801   BP: 152/87   Pulse: 68   Resp: 20   Temp: 98 °F (36.7 °C)   SpO2: 100%   Weight: 127 kg (280 lb 9.6 oz)   Height: 175.3 cm (69\")   PainSc:   1   PainLoc: Leg  Comment: right     Objective   Physical Exam   Constitutional: He is oriented to person, place, and time. He appears well-developed and well-nourished. He is cooperative. No distress.   HENT:   Head: Normocephalic and atraumatic.   Nose: Nose normal.   Eyes: Pupils are equal, round, and reactive to light. Conjunctivae and lids are normal.   Neck: Trachea normal and normal range of motion. Neck supple.   Cardiovascular: Normal rate, regular rhythm and normal heart sounds.   Pulmonary/Chest: Effort normal and breath sounds normal. No respiratory distress.   Abdominal: Soft. Normal appearance and bowel sounds are normal. He exhibits no distension. There is no tenderness. There is no guarding.   Musculoskeletal: Normal range of motion. He exhibits no deformity.        Lumbar back: He exhibits tenderness and pain. He exhibits normal range of motion and no bony tenderness.   Neurological: He is alert and oriented to person, place, and time. He has normal strength and normal reflexes. No cranial nerve deficit or sensory deficit. Coordination and gait normal.   Reflex Scores:       Patellar reflexes are 2+ on the " right side and 2+ on the left side.       Achilles reflexes are 2+ on the right side and 2+ on the left side.  Skin: Skin is warm, dry and intact. He is not diaphoretic.   Psychiatric: He has a normal mood and affect. His speech is normal and behavior is normal. Judgment and thought content normal. Cognition and memory are normal.   Nursing note and vitals reviewed.    Assessment/Plan   Anthony was seen today for leg pain.    Diagnoses and all orders for this visit:    Other chronic pain    DDD (degenerative disc disease), lumbar  -     Cancel: Case Request  -     Case Request    Lumbar radiculopathy  -     Cancel: Case Request  -     Case Request      --- Right L4 LTFESI x 2, 2-4 weeks apart. Reviewed the procedure at length with the patient.  Included in the review was expectations, complications, risk and benefits.The procedure was described in detail and the risks, benefits and alternatives were discussed with the patient (including but not limited to: bleeding, infection, nerve damage, worsening of pain, inability to perform injection, paralysis, seizures, and death) who agreed to proceed.  Discussed the potential for sedation if warranted/wanted.  The procedure will plan to be performed at [facility] with fluoroscopic guidance(unless ultrasound is indicated). Questions were answered and in a way the patient could understand.  Patient verbalized understanding and wishes to proceed.  This intervention will be ordered.  Discussed with patient that all procedures are part of a multimodal plan of care and include either formal PT or a home exercise program.  Patient has no evidence of coagulopathy or current infection.  --- Routine UDS in office today as part of new patient evaluation.  The specimen was viewed and the immunoassay result reviewed and is NEG.  This specimen will be sent to [facility] for confirmation.     --- Follow-up after procedure      DEANDRE REPORT  DEANDRE report  has been reviewed and scanned into the patient's chart.    As the clinician, I personally reviewed the DEANDRE from 1/29/2020 while the patient was in the office today.    EMR Dragon/Transcription disclaimer:   Much of this encounter note is an electronic transcription/translation of spoken language to printed text. The electronic translation of spoken language may permit erroneous, or at times, nonsensical words or phrases to be inadvertently transcribed; Although I have reviewed the note for such errors, some may still exist.

## 2020-02-20 ENCOUNTER — DOCUMENTATION (OUTPATIENT)
Dept: PAIN MEDICINE | Facility: CLINIC | Age: 55
End: 2020-02-20

## 2020-02-20 ENCOUNTER — OUTSIDE FACILITY SERVICE (OUTPATIENT)
Dept: PAIN MEDICINE | Facility: CLINIC | Age: 55
End: 2020-02-20

## 2020-02-20 PROCEDURE — 64483 NJX AA&/STRD TFRM EPI L/S 1: CPT | Performed by: ANESTHESIOLOGY

## 2020-02-21 NOTE — PROGRESS NOTES
Lumbar Transforaminal Epidural Steroid Injection (one level Unilateral)  [facility]      PREOPERATIVE DIAGNOSIS:  Lumbar Degenerative Disc Disease and right Lumbar Radiculopathy    POSTOPERATIVE DIAGNOSIS:  Same as preop diagnosis    PROCEDURE:  CPT 23788 --  Diagnostic Transforaminal Epidural Steroid Injection at the L4 level, on the right     PRE-PROCEDURE DISCUSSION WITH PATIENT:    Risks and complications were discussed with the patient prior to starting the procedure and informed consent was obtained.  We discussed various topics including but not limited to bleeding, infection, injury, nerve injury, paralysis, coma, death, postprocedural painful flare-up, postprocedural site soreness, and a lack of pain relief.  We discussed the diagnostic aspect of transforaminal epidural / selective nerve root blockade.    SURGEON:  Kartik Crews MD    REASON FOR PROCEDURE:    Degenerative changes are noted in the area. and Radiating pattern of pain is likely consistent with degenerative changes in the area.    SEDATION:  Versed 4mg & Fentanyl 50 mcg IV  ANESTHETIC:  Marcaine 0.25%  STEROID:  Methylprednisolone (DEPO MEDROL) 80mg/ml    DESCRIPTON OF PROCEDURE:  After obtaining informed consent, an I.V. was started in the preoperative area. The patient taken to the operating room and was placed in the prone position with a pillow under the abdomen.  All pressure points were well padded.  EKG, blood pressure, and pulse oximeter were monitored.  The lumbar area was prepped with Chloraprep and draped in a sterile fashion. Under fluoroscopic guidance in an oblique dimension on the above mentioned side, the transverse process of the aforementioned vertebra at the junction of the body at 6 o'clock position was identified. Skin and subcutaneous tissue was anesthetized with 1% lidocaine. A 22-gauge spinal needle was introduced under fluoroscopic guidance at the above junction into the foramen without  parasthesias and into the epidural space. After confirming the position of the needle with PA, lateral, and oblique fluoroscopic views, aspiration was checked and was clear of blood or CSF.  Next, 1 mL of Omnipaque was injected. After seeing adequate spread on the corresponding nerve root, a total volume 3mL of injectate containing 1ml of the above mentioned local anesthetic, 1 ml saline,  and the above mentioned corticosteroid was injected into the epidural space.    The needle was removed intact.  Vital signs were stable throughout.        ESTIMATED BLOOD LOSS:  <5 mL  SPECIMENS:  none    COMPLICATIONS:   No complications were noted., There was no indication of vascular uptake on live injection of contrast dye., There was no indication of intrathecal uptake on live injection of contrast dye., There was not any evidence of dural puncture.   and The patient did not have any signs of postprocedure numbness nor weakness.    TOLERANCE & DISCHARGE CONDITION:    The patient tolerated the procedure well.  The patient was transported to the recovery area without difficulties.  The patient was discharged to home under the care of family in stable and satisfactory condition.    PLAN OF CARE:  1. The patient was given our standard instruction sheet.  2. The patient will Repeat injection 2-4 wks.  3. The patient will resume all medications as per the medication reconciliation sheet.

## 2020-05-28 RX ORDER — MELOXICAM 15 MG/1
TABLET ORAL
Qty: 90 TABLET | Refills: 3 | Status: SHIPPED | OUTPATIENT
Start: 2020-05-28 | End: 2021-06-07

## 2020-06-12 ENCOUNTER — HOSPITAL ENCOUNTER (OUTPATIENT)
Dept: OTHER | Facility: HOSPITAL | Age: 55
Discharge: HOME | End: 2020-06-12
Attending: FAMILY MEDICINE

## 2020-06-12 ENCOUNTER — OFFICE VISIT CONVERTED (OUTPATIENT)
Dept: FAMILY MEDICINE CLINIC | Age: 55
End: 2020-06-12
Attending: FAMILY MEDICINE

## 2020-08-14 ENCOUNTER — HOSPITAL ENCOUNTER (OUTPATIENT)
Dept: OTHER | Facility: HOSPITAL | Age: 55
Discharge: HOME | End: 2020-08-14
Attending: FAMILY MEDICINE

## 2020-08-17 LAB — SARS-COV-2 RNA SPEC QL NAA+PROBE: NOT DETECTED

## 2020-08-18 ENCOUNTER — OFFICE VISIT CONVERTED (OUTPATIENT)
Dept: FAMILY MEDICINE CLINIC | Age: 55
End: 2020-08-18
Attending: NURSE PRACTITIONER

## 2020-08-18 ENCOUNTER — HOSPITAL ENCOUNTER (OUTPATIENT)
Dept: OTHER | Facility: HOSPITAL | Age: 55
Discharge: HOME | End: 2020-08-18
Attending: NURSE PRACTITIONER

## 2020-08-24 ENCOUNTER — LAB REQUISITION (OUTPATIENT)
Dept: LAB | Facility: HOSPITAL | Age: 55
End: 2020-08-24

## 2020-08-24 DIAGNOSIS — Z00.00 ENCOUNTER FOR GENERAL ADULT MEDICAL EXAMINATION WITHOUT ABNORMAL FINDINGS: ICD-10-CM

## 2020-10-12 ENCOUNTER — HOSPITAL ENCOUNTER (OUTPATIENT)
Dept: OTHER | Facility: HOSPITAL | Age: 55
Discharge: HOME | End: 2020-10-12
Attending: NURSE PRACTITIONER

## 2020-10-12 ENCOUNTER — OFFICE VISIT CONVERTED (OUTPATIENT)
Dept: FAMILY MEDICINE CLINIC | Age: 55
End: 2020-10-12
Attending: NURSE PRACTITIONER

## 2020-10-12 LAB
ALBUMIN SERPL-MCNC: 4.2 G/DL (ref 3.5–5)
ALBUMIN/GLOB SERPL: 1.5 {RATIO} (ref 1.4–2.6)
ALP SERPL-CCNC: 83 U/L (ref 56–119)
ALT SERPL-CCNC: 44 U/L (ref 10–40)
ANION GAP SERPL CALC-SCNC: 14 MMOL/L (ref 8–19)
AST SERPL-CCNC: 28 U/L (ref 15–50)
BILIRUB SERPL-MCNC: 0.29 MG/DL (ref 0.2–1.3)
BUN SERPL-MCNC: 12 MG/DL (ref 5–25)
BUN/CREAT SERPL: 11 {RATIO} (ref 6–20)
CALCIUM SERPL-MCNC: 10 MG/DL (ref 8.7–10.4)
CHLORIDE SERPL-SCNC: 106 MMOL/L (ref 99–111)
CONV CO2: 26 MMOL/L (ref 22–32)
CONV RHEUMATOID FACTOR IGM: <10 [IU]/ML (ref 0–14)
CONV TOTAL PROTEIN: 7 G/DL (ref 6.3–8.2)
CREAT UR-MCNC: 1.07 MG/DL (ref 0.7–1.2)
ERYTHROCYTE [SEDIMENTATION RATE] IN BLOOD: 3 MM/H (ref 0–20)
GFR SERPLBLD BASED ON 1.73 SQ M-ARVRAT: >60 ML/MIN/{1.73_M2}
GLOBULIN UR ELPH-MCNC: 2.8 G/DL (ref 2–3.5)
GLUCOSE SERPL-MCNC: 115 MG/DL (ref 70–99)
OSMOLALITY SERPL CALC.SUM OF ELEC: 295 MOSM/KG (ref 273–304)
POTASSIUM SERPL-SCNC: 4.4 MMOL/L (ref 3.5–5.3)
SODIUM SERPL-SCNC: 142 MMOL/L (ref 135–147)

## 2020-10-14 LAB
DSDNA AB SER-ACNC: NEGATIVE [IU]/ML
ENA AB SER IA-ACNC: NEGATIVE {RATIO}

## 2020-10-15 LAB — SARS-COV-2 RNA SPEC QL NAA+PROBE: NOT DETECTED

## 2020-10-28 ENCOUNTER — OFFICE VISIT CONVERTED (OUTPATIENT)
Dept: FAMILY MEDICINE CLINIC | Age: 55
End: 2020-10-28
Attending: NURSE PRACTITIONER

## 2021-05-18 NOTE — PROGRESS NOTES
Anthony Brown  1965     Office/Outpatient Visit    Visit Date: Mon, Oct 12, 2020 11:15 am    Provider: Lenora Treviño N.P. (Assistant: Aparna Wing MA)    Location: [facility]        Electronically signed by Lenora Treviño N.P. on  10/12/2020 03:13:10 PM                             Subjective:        CC: Jeremy is a 55 year old White male.  Patient presents today with complaints of body aches and nasal congestion X 10 days         HPI: 54 y/o male presenting to clinic for body aches x 10 days. Pt states he was negative x 2 for Covid in August, but was told he likely had it . He states he has felt fatigued since. He also states that he has generalized joint pain (worse in dorothy hands) that has worsened since illness in August. His sister has RA. He does also c/o nasal congestion but states it is normal for him this time of the year. He takes daily allergy medication and flonase.    ROS:     CONSTITUTIONAL:  Negative for chills, fatigue and fever.      EYES:  Negative for blurred vision.      E/N/T:  Negative for ear pain, frequent rhinorrhea and sinus pressure.      CARDIOVASCULAR:  Negative for chest pain, dizziness, palpitations and pedal edema.      RESPIRATORY:  Negative for recent cough and dyspnea.      GASTROINTESTINAL:  Negative for abdominal pain, constipation, diarrhea, nausea and vomiting.      MUSCULOSKELETAL:  Positive for joint stiffness.   Negative for myalgias.      INTEGUMENTARY:  Negative for rash.      NEUROLOGICAL:  Negative for headaches, paresthesias and weakness.          Past Medical History / Family History / Social History:         Last Reviewed on 10/12/2020 11:54 AM by Lenora Treviño    Past Medical History:             PAST MEDICAL HISTORY         Positive for    Sleep Apnea;     Positive for    barretts esophagus;     Positive for    testosterone deficiency;     Positive for    sciatica;         PREVENTIVE HEALTH MAINTENANCE             COLORECTAL  CANCER SCREENING: 3-4 years ago - ([facility] )         Surgical History:         Tonsillectomy     right knee replacement 2017;         Family History:     Father: colon polyps;  Transient Ischemic Attack     Mother: Hypertension;  Type 2 Diabetes     Sister(s): Hypertension;  Rheumatoid Arthritis         Social History:     Occupation:      Marital Status:      Children: 2 children         Tobacco/Alcohol/Supplements:     Last Reviewed on 10/12/2020 11:54 AM by Lenora Treviño    Tobacco: He has a past history of cigarette smoking; quit date:  Over 35 years ago..          Alcohol: Frequency:    One beer a week.;     Caffeine:  He admits to consuming caffeine via soda ( -Maybe a couple of sodas a week. ).          Substance Abuse History:     Last Reviewed on 10/12/2020 11:54 AM by Lenora Treviño    None         Mental Health History:     Last Reviewed on 10/12/2020 11:54 AM by Lenora Treviño        Bipolar Disorder ( with depression )         Communicable Diseases (eg STDs):     Last Reviewed on 6/12/2020 07:10 PM by Umer Henderson        Immunizations:     None        Allergies:     Last Reviewed on 10/12/2020 11:53 AM by Lenora Treviño    No Known Allergies.        Current Medications:     Last Reviewed on 10/12/2020 11:53 AM by Lenora Treviño    Probiotic     dietary supplement     Vitamin D3 25 mcg (1,000 unit) oral capsule    Ultimate Male Supplement     [place] [employer] Supplement Powder     FENOFIBRATE  TAB 160MG     MELOXICAM    TAB 15MG     LISINOPRIL   TAB 20MG     LAMOTRIGINE  TAB 200MG     omeprazole 20 mg oral capsule,delayed release (enteric coated) [TAKE 1 CAPSULE (20 MG) BY MOUTH DAILY]    albuterol sulfate 90 mcg/actuation Inhalation HFA Aerosol Inhaler [inhale 1 - 2 puffs (90 - 180 mcg) by inhalation route every 4 hours as needed]        Objective:        Vitals:         Current: 10/12/2020 11:17:53 AM    Ht:  5 ft, 9 in;  Wt: 289.1 lbs;  BMI: 42.7T: 96.4  F (temporal);  BP: 130/76 mm Hg (left arm, sitting);  P: 80 bpm (left arm (BP Cuff), sitting);  sCr: 1.21 mg/dL;  GFR: 81.71        Exams:     PHYSICAL EXAM:     GENERAL: Vitals recorded well developed, well nourished;  well groomed;  no apparent distress;     EYES: extraocular movements intact; conjunctiva and cornea are normal; PERRLA;     NECK: range of motion is normal;     RESPIRATORY: normal respiratory rate and pattern with no distress; normal breath sounds with no rales, rhonchi, wheezes or rubs;     CARDIOVASCULAR: normal rate; rhythm is regular;  no systolic murmur; no edema;     SKIN:  no rash; no jaundice, good turgor;     MUSCULOSKELETAL: normal gait; decreased range of motion noted in: bilateral wrist flexion and extension;  normal overall tone     NEUROLOGIC: mental status: alert and oriented x 3;     PSYCHIATRIC:  appropriate affect and demeanor; normal speech pattern; grossly normal memory;         Lab/Test Results:         Influenza A and B: Negative (10/12/2020),     Performed by:: sanju (10/12/2020),             Assessment:         M25.50   Pain in unspecified joint       R09.81   Nasal congestion           ORDERS:         Radiology/Test Orders:       [id number]  COVID 19 Testing [facility]  (Send-Out)              Lab Orders:       [id number]  Infectious agent antigen detection by immunoassay; Influenza  (In-House)            [id number]  Infectious agent antigen detection by immunoassay; Influenza  (In-House)            26445  REEMA - [facility] PARISH w/Reflex  (Send-Out)            12606  COMP - [facility] Comp. Metabolic Panel  (Send-Out)            31724  SED - [facility] Sedimentation rate, non-automated ESR  (Send-Out)            11913  RHF- [facility] Rheumatoid factor  (Send-Out)                      Plan:         Pain in unspecified jointPt to be tested for RA. Viral infection may have exacerbated and with family hx, pt would like for it to be ruled out. Pt to continue mobic. Take tylenol arthritis prn.     LABORATORY:  Labs ordered to be  performed today include PARISH with Reflex, Comprehensive metabolic panel, ESR, and rheumatoid factor.            Orders:       [id number]  REEMA - [facility] PARISH w/Reflex  (Send-Out)            [id number]  COMP - [facility] Comp. Metabolic Panel  (Send-Out)            32141  SED - [facility] Sedimentation rate, non-automated ESR  (Send-Out)            26869  RHF- [facility] Rheumatoid factor  (Send-Out)              Nasal congestionContinue taking allergy medication as rx. notify office with any acute concern or issue.           Orders:       [id number]  COVID 19 Testing [facility]  (Send-Out)            [id number]  Infectious agent antigen detection by immunoassay; Influenza  (In-House)            60132  Infectious agent antigen detection by immunoassay; Influenza  (In-House)                  Charge Capture:         Primary Diagnosis:     M25.50  Pain in unspecified joint           Orders:      [id number]  Office/outpatient visit; established patient, level 4  (In-House)              R09.81  Nasal congestion           Orders:      [id number]  Infectious agent antigen detection by immunoassay; Influenza  (In-House)            35732  Infectious agent antigen detection by immunoassay; Influenza  (In-House)                  ADDENDUMS:      ____________________________________    Addendum: 10/16/2020 05:28 PM - Lenora Treviño        remove negative fatigue from ROS.

## 2021-05-18 NOTE — PROGRESS NOTES
Anthony Brown  1965     Office/Outpatient Visit    Visit Date: Wed, Oct 28, 2020 04:16 pm    Provider: Mercedes Armstrong N.P. (Assistant: Kim Parra, MA)    Location: [facility]        Electronically signed by Mercedes Armstrong N.P. on  11/01/2020 08:41:53 AM                             Subjective:        CC: Jeremy is a 55 year old White male.  This is a follow-up visit.  discuss sleep apnea         HPI:           Complaint of sleep apnea, unspecified..  The symptom began years ago.  Has been using Cpap for about 12-14 years  last sleep study was about 7 years  Wife thinks that his cpap needs to be updated.  He has been more tired than usual, more labored breathing noted     ROS:     CONSTITUTIONAL:  Positive for fatigue.   Negative for chills or fever.      CARDIOVASCULAR:  Negative for chest pain and pedal edema.      RESPIRATORY:  Positive for dyspnea ( with moderate exertion ) and currently using cpap.   Negative for recent cough or frequent wheezing.          Past Medical History / Family History / Social History:         Last Reviewed on 10/12/2020 11:54 AM by Lenora Treviño    Past Medical History:             PAST MEDICAL HISTORY         Positive for    Sleep Apnea;     Positive for    barretts esophagus;     Positive for    testosterone deficiency;     Positive for    sciatica;         PREVENTIVE HEALTH MAINTENANCE             COLORECTAL CANCER SCREENING: 3-4 years ago - ([facility] )         Surgical History:         Tonsillectomy     right knee replacement 2017;         Family History:     Father: colon polyps;  Transient Ischemic Attack     Mother: Hypertension;  Type 2 Diabetes     Sister(s): Hypertension;  Rheumatoid Arthritis         Social History:     Occupation:      Marital Status:      Children: 2 children         Tobacco/Alcohol/Supplements:     Last Reviewed on 10/28/2020 04:21 PM by Kim Parra    Tobacco: He has a past  history of cigarette smoking; quit date:  Over 35 years ago..          Alcohol: Frequency:    One beer a week.;     Caffeine:  He admits to consuming caffeine via soda ( -Maybe a couple of sodas a week. ).          Substance Abuse History:     Last Reviewed on 10/12/2020 11:54 AM by Lenora Treviño    None         Mental Health History:     Last Reviewed on 10/12/2020 11:54 AM by Lenora Treviño        Bipolar Disorder ( with depression )         Communicable Diseases (eg STDs):     Last Reviewed on 6/12/2020 07:10 PM by Umer Henderson        Current Problems:     Last Reviewed on 10/12/2020 02:37 PM by Lenora Treviño    Hyperlipidemia, unspecified    Bipolar disorder, current episode depressed, mild or moderate severity, unspecified    Sleep apnea, unspecified    Essential (primary) hypertension    Abreu's esophagus with dysplasia, unspecified    Low back pain    Testicular hypofunction    Contact with and (suspected) exposure to other viral communicable diseases    Shortness of breath    Acute upper respiratory infection, unspecified    Nasal congestion    Pain in unspecified joint        Immunizations:     None        Allergies:     Last Reviewed on 10/28/2020 04:21 PM by Kim Parra    No Known Allergies.        Current Medications:     Last Reviewed on 10/28/2020 04:21 PM by Kim Parra    Probiotic     Vitamin D3 25 mcg (1,000 unit) oral capsule    [place] [employer] Greens Supplement Powder     FENOFIBRATE  TAB 160MG     MELOXICAM    TAB 15MG     LISINOPRIL   TAB 20MG     LAMOTRIGINE  TAB 200MG     omeprazole 20 mg oral capsule,delayed release (enteric coated) [TAKE 1 CAPSULE (20 MG) BY MOUTH DAILY]    albuterol sulfate 90 mcg/actuation Inhalation HFA Aerosol Inhaler [inhale 1 - 2 puffs (90 - 180 mcg) by inhalation route every 4 hours as needed]        Objective:        Vitals:         Current: 10/28/2020 4:22:39 PM    Ht:  5 ft, 9 in;  Wt: 281.6 lbs;  BMI: 41.6T: 97.7 F (temporal);   BP: 128/77 mm Hg (left arm, sitting);  P: 66 bpm (left arm (BP Cuff), sitting);  sCr: 1.07 mg/dL;  GFR: 91.37        Assessment:         G47.30   Sleep apnea, unspecified           ORDERS:         Procedures Ordered:       REFER  Referral to Specialist or Other Facility  (Send-Out)                      Plan:         Sleep apnea, unspecifiedWill refer to sleep center. Recommend that he come in for wellness visit in the next 1-2 months        REFERRALS:  Referral initiated to [facility].            Orders:       REFER  Referral to Specialist or Other Facility  (Send-Out)                  Charge Capture:         Primary Diagnosis:     G47.30  Sleep apnea, unspecified           Orders:      [id number]  Office/outpatient visit; established patient, level 3  (In-House)                  ADDENDUMS:      ____________________________________    Addendum: 11/03/2020 02:10 PM - Mercedes Armstrong        Exams:     PHYSICAL EXAM:     GENERAL: Vitals recorded well developed, well nourished;  no apparent distress; obese    RESPIRATORY: normal appearance and symmetric expansion of chest wall; normal respiratory rate and pattern with no distress; normal breath sounds with no rales, rhonchi, wheezes or rubs;     CARDIOVASCULAR: normal rate; rhythm is regular;  no edema    MUSCULOSKELETAL: normal gait; normal range of motion of all major muscle groups    NEUROLOGIC: mental status: alert and oriented x 3;     PSYCHIATRIC: appropriate affect and demeanor; normal speech pattern; normal thought and perception;

## 2021-05-18 NOTE — PROGRESS NOTES
Anthony Brown  1965     Office/Outpatient Visit    Visit Date: Fri, Jun 12, 2020 11:20 am    Provider: Umer Henderson MD (Assistant: Evelyne Cruz MA)    Location: [facility]        Electronically signed by Umer Henderson MD on  06/12/2020 07:11:24 PM                             Subjective:        CC: Jeremy is a 54 year old White male.  He presents with joint pain in left arm when driving or while using cell phone..          HPI:       BP today is 142/86 with a HR of 78. Pt has pain in left arm, starts in fingers and radiates to elbow. Feels like a deep ache, like its in the bone. Most noticeable when driving, he drives with left hand on the side of the steering wheel with right arm rest. Also noticeable when left arm/hand is in a similar position checking email on his phone. Pain begins in 5 minutes or less and worsens the longer he's in that position.    ROS:     CONSTITUTIONAL:  Negative for chills, fatigue and fever.      CARDIOVASCULAR:  Negative for chest pain and pedal edema.      RESPIRATORY:  Negative for recent cough and dyspnea.      MUSCULOSKELETAL:  Positive for back pain and limb pain ( left upper extremity pain ).   Negative for arthralgias.      NEUROLOGICAL:  Positive for paresthesia ( left upper extremity ).          Past Medical History / Family History / Social History:         Last Reviewed on 6/12/2020 07:10 PM by Umer Henderson    Past Medical History:             PAST MEDICAL HISTORY         Positive for    Sleep Apnea;     Positive for    barretts esophagus;     Positive for    testosterone deficiency;     Positive for    sciatica;         PREVENTIVE HEALTH MAINTENANCE             COLORECTAL CANCER SCREENING: 3-4 years ago - ([facility] )         Surgical History:         Tonsillectomy     right knee replacement 2017;         Family History:     Father: colon polyps;  Transient Ischemic Attack     Mother: Hypertension;  Type 2 Diabetes     Sister(s):  Hypertension;  Rheumatoid Arthritis         Social History:     Occupation:      Marital Status:      Children: 2 children         Tobacco/Alcohol/Supplements:     Last Reviewed on 6/12/2020 07:10 PM by Umer Henderson    Tobacco: He has a past history of cigarette smoking; quit date:  Over 35 years ago..          Alcohol: Frequency:    One beer a week.;     Caffeine:  He admits to consuming caffeine via soda ( -Maybe a couple of sodas a week. ).          Substance Abuse History:     Last Reviewed on 6/12/2020 07:10 PM by Umer Henderson    None         Mental Health History:     Last Reviewed on 6/12/2020 07:10 PM by Umer Henderson        Bipolar Disorder ( with depression )         Communicable Diseases (eg STDs):     Last Reviewed on 6/12/2020 07:10 PM by Umer Henderson        Current Problems:     Last Reviewed on 6/12/2020 07:10 PM by Umer Henderson    Hyperlipidemia, unspecified    Bipolar disorder, current episode depressed, mild or moderate severity, unspecified    Sleep apnea, unspecified    Essential (primary) hypertension    Abreu's esophagus with dysplasia, unspecified    Low back pain    Encounter for screening for depression    Encounter for screening for cardiovascular disorders    Testicular hypofunction    Encounter for screening for malignant neoplasm of colon    Pain in left forearm        Immunizations:     None        Allergies:     Last Reviewed on 6/12/2020 07:10 PM by Umer Henderson    No Known Allergies.        Current Medications:     Last Reviewed on 6/12/2020 07:10 PM by Umer Henderson    FENOFIBRATE  TAB 160MG     MELOXICAM    TAB 15MG     LISINOPRIL   TAB 20MG     LAMOTRIGINE  TAB 200MG     Probiotic     dietary supplement     Vitamin D3 25 mcg (1,000 unit) oral capsule    Ultimate Male Supplement     [place] [employer] Greens Supplement Powder     omeprazole 20 mg oral capsule,delayed release (enteric coated) [TAKE 1 CAPSULE (20 MG) BY MOUTH DAILY]         Objective:        Vitals:         Current: 6/12/2020 11:29:23 AM    Ht:  5 ft, 9 in;  Wt: 280 lbs;  BMI: 41.3T: 97.6 F (oral);  BP: 142/86 mm Hg (left arm, sitting);  P: 78 bpm (left arm (BP Cuff), sitting);  sCr: 1.21 mg/dL;  GFR: 81.52        Exams:     PHYSICAL EXAM:     GENERAL: Vitals recorded well developed, well nourished,  morbidly obese;  well groomed;  no apparent distress;     EYES: extraocular movements intact; conjunctiva and cornea are normal; PERRLA;     NECK: range of motion is normal; thyroid exam reveals not enlarged;     RESPIRATORY: normal respiratory rate and pattern with no distress; normal breath sounds with no rales, rhonchi, wheezes or rubs;     CARDIOVASCULAR: normal rate; rhythm is regular;  no systolic murmur; no edema;     GASTROINTESTINAL: nontender; normal bowel sounds;     MUSCULOSKELETAL: normal gait; no limb or joint pain with range of motion; normal overall tone left elbow and wrist exam are normal;     NEUROLOGIC: spurling negative bilaterally; mental status: alert and oriented x 3; GROSSLY INTACT     PSYCHIATRIC:  appropriate affect and demeanor; normal speech pattern; grossly normal memory;         Assessment:         M79.632   Pain in left forearm           ORDERS:         Radiology/Test Orders:       [id number]  Radiologic examination, spine, cervical; minimum of four views  (Send-Out)            [id number]  Left radiologic examination, elbow; complete, minimum of three views  (Send-Out)            [id number]  Left radiologic examination, forearm, 2 views  (Send-Out)                      Plan:         Pain in left forearmEtiology unclear although appears to be a neuropathy so x-ray of c-spine, left elbow, and left radial/ulnar is ordered. Exam is normal, no pain or neuropathy elicited with resisted elbow flexion, extension, supination, or pronation; neither with resisted extension or flexion of wrist.  If pain persists or worsens, pt should be referred to PT. He has no diabetes. A small  amount of NSAIDs, rest, and ice is recommended.        RADIOLOGY:  I have ordered a C-Spine x-ray series, a left elbow x-ray, and a Left Radius/Ulna to be done today.            Orders:       [id number]  Radiologic examination, spine, cervical; minimum of four views  (Send-Out)            [id number]  Left radiologic examination, elbow; complete, minimum of three views  (Send-Out)            [id number]  Left radiologic examination, forearm, 2 views  (Send-Out)                  Patient Recommendations:        For  Pain in left forearm:    left elbow x-ray             Charge Capture:         Primary Diagnosis:     M79.632  Pain in left forearm           Orders:      [id number]  Office/outpatient visit; established patient, level 3  (In-House)

## 2021-05-18 NOTE — PROGRESS NOTES
"Anthony Brown  1965     Office/Outpatient Visit    Visit Date: Tue, Aug 18, 2020 11:48 am    Provider: Nevaeh Pineda N.P. (Assistant: Tanika Nick LPN)    Location: [facility]        Electronically signed by Nevaeh Pineda N.P. on  08/18/2020 08:26:27 PM                             Subjective:        CC: had 2 negative covid tests last friday, one here and 1 rapid in [place]    shortness of breath at times states air feels heavy, cough at times, sore throat off and on but has allergies also    HPI:           Patient to be evaluated for acute upper respiratory infection, unspecified.   was dx with covid 19 on august 13.  pt was last in contact with  8 days prior to that.  he was tested for covid 19 here and had a rapid test in [place] both on aug 14 due to exposure.  both tests were read as not detected.  has had chortness of breath with 99 temp and fatigue the last 2 days with no other sympotms except nausea with riding in car.  no meds have been taken for symptoms.      ROS:     CONSTITUTIONAL:  Negative for chills, fatigue, fever, and weight change.      CARDIOVASCULAR:  Negative for chest pain, palpitations, tachycardia, orthopnea, and edema.      RESPIRATORY:  Positive for dyspnea.   Negative for recent cough or frequent wheezing.      GASTROINTESTINAL:  Negative for abdominal pain, heartburn, constipation, diarrhea, and stool changes.      MUSCULOSKELETAL:  Positive for arthralgias (mild of elbows and knees).   Negative for limb pain or myalgias.      NEUROLOGICAL:  Positive for headaches ( \"sinus\"; mild, generalzied ).          Past Medical History / Family History / Social History:         Last Reviewed on 6/12/2020 07:10 PM by Umer Henderson    Past Medical History:             PAST MEDICAL HISTORY         Positive for    Sleep Apnea;     Positive for    barretts esophagus;     Positive for    testosterone deficiency;     Positive for    sciatica;         " PREVENTIVE HEALTH MAINTENANCE             COLORECTAL CANCER SCREENING: 3-4 years ago - ([facility] )         Surgical History:         Tonsillectomy     right knee replacement 2017;         Family History:     Father: colon polyps;  Transient Ischemic Attack     Mother: Hypertension;  Type 2 Diabetes     Sister(s): Hypertension;  Rheumatoid Arthritis         Social History:     Occupation:      Marital Status:      Children: 2 children         Tobacco/Alcohol/Supplements:     Last Reviewed on 6/12/2020 07:10 PM by Umer Henderson    Tobacco: He has a past history of cigarette smoking; quit date:  Over 35 years ago..          Alcohol: Frequency:    One beer a week.;     Caffeine:  He admits to consuming caffeine via soda ( -Maybe a couple of sodas a week. ).          Substance Abuse History:     Last Reviewed on 6/12/2020 07:10 PM by Umer Henderson    None         Mental Health History:     Last Reviewed on 6/12/2020 07:10 PM by Umer Henderson        Bipolar Disorder ( with depression )         Communicable Diseases (eg STDs):     Last Reviewed on 6/12/2020 07:10 PM by Umer Henderson        Current Problems:     Last Reviewed on 6/12/2020 07:10 PM by Umer Henderson    Hyperlipidemia, unspecified    Bipolar disorder, current episode depressed, mild or moderate severity, unspecified    Sleep apnea, unspecified    Essential (primary) hypertension    Abreu's esophagus with dysplasia, unspecified    Low back pain    Encounter for screening for depression    Encounter for screening for cardiovascular disorders    Testicular hypofunction    Encounter for screening for malignant neoplasm of colon    Pain in left forearm    Encounter for screening for other viral diseases    Contact with and (suspected) exposure to other viral communicable diseases        Immunizations:     None        Allergies:     Last Reviewed on 8/18/2020 11:55 AM by Tanika Nick    No Known Allergies.        Current  Medications:     Last Reviewed on 8/18/2020 11:55 AM by Tanika Nick    Probiotic     dietary supplement     Vitamin D3 25 mcg (1,000 unit) oral capsule    Ultimate Male Supplement     [place] Power Greens Supplement Powder     FENOFIBRATE  TAB 160MG     MELOXICAM    TAB 15MG     LISINOPRIL   TAB 20MG     LAMOTRIGINE  TAB 200MG     omeprazole 20 mg oral capsule,delayed release (enteric coated) [TAKE 1 CAPSULE (20 MG) BY MOUTH DAILY]        Objective:        Vitals:         Current: 8/18/2020 11:53:53 AM    Ht:  5 ft, 9 in;  Wt: 280.5 lbs;  BMI: 41.4T: 97.3 F (temporal);  BP: 138/76 mm Hg (left arm, sitting);  P: 79 bpm (left arm (BP Cuff), sitting);  sCr: 1.21 mg/dL;  GFR: 81.58O2 Sat: 98 % (room air)        Exams:     PHYSICAL EXAM:     GENERAL: obese;  no apparent distress;     E/N/T: EARS: bilateral TMs are normal;  OROPHARYNX: posterior pharynx, including tonsils, tongue, and uvula are normal;     RESPIRATORY: normal respiratory rate and pattern with no distress; decreased breath sounds in the RLL;     CARDIOVASCULAR: normal rate; rhythm is regular;     Peripheral Pulses: posterior tibial: 2+ amplitude, no bruits; no edema;     LYMPHATIC: no enlargement of cervical or facial nodes;     NEUROLOGIC: mental status: alert and oriented x 3; GROSSLY INTACT     PSYCHIATRIC:  appropriate affect and demeanor; normal speech pattern; grossly normal memory;         Assessment:         J06.9   Acute upper respiratory infection, unspecified       R06.02   Shortness of breath           ORDERS:         Meds Prescribed:       [New Rx] albuterol sulfate 90 mcg/actuation Inhalation HFA Aerosol Inhaler [inhale 1 - 2 puffs (90 - 180 mcg) by inhalation route every 4 hours as needed], #8.5 (eight point five) grams, Refills: 0 (zero)         Radiology/Test Orders:       [id number]  Radiologic exam chest 2 views  (Send-Out)                      Plan:         Acute upper respiratory infection, unspecified        RECOMMENDATIONS given include:  rest, increase oral fluid intake, and symptomatic tx.  consider repeat covid 19 test at end of week prn.  he is not working currently and is self isolating.  aman closely..          Shortness of breath        RADIOLOGY:  I have ordered a chest x-ray (PA and lateral) to be done today.            Prescriptions:       [New Rx] albuterol sulfate 90 mcg/actuation Inhalation HFA Aerosol Inhaler [inhale 1 - 2 puffs (90 - 180 mcg) by inhalation route every 4 hours as needed], #8.5 (eight point five) grams, Refills: 0 (zero)           Orders:       [id number]  Radiologic exam chest 2 views  (Send-Out)                  Charge Capture:         Primary Diagnosis:     J06.9  Acute upper respiratory infection, unspecified           Orders:      [id number]  Office/outpatient visit; established patient, level 3  (In-House)              R06.02  Shortness of breath

## 2021-05-18 NOTE — PROGRESS NOTES
Anthony Brown  1965     Office/Outpatient Visit    Visit Date: Wed, Nov 13, 2019 09:23 am    Provider: Mercedes Armstrong N.P. (Assistant: Spurling, Sarah C, MA)    Location: [facility]        Electronically signed by Mercedes Armstrong N.P. on  11/13/2019 04:10:05 PM                             Subjective:        CC: Mr. Brown is a 54 year old male.  This is his first visit to the clinic.  Tingling in both of his thighs, mainly the right.          HPI:           Low back pain details; reason for visit: Pain.  The discomfort is most prominent in the lumbar spine.  He states that the current episode of pain started 2-3 weeks ago.  Medical history is significant for pain management in the past with epidural x 2 ;  no problems since that time - MRI many years ago and no prior known back injury.  Other details: RIGHT LEG WITH TINGLING/ NUMBNESS - HAD BULGING  DISCS  IN THE PAST;  sciatica in the past.      ROS:     CONSTITUTIONAL:  Negative for chills, fatigue and fever.      CARDIOVASCULAR:  Negative for chest pain, orthopnea, paroxysmal nocturnal dyspnea and pedal edema.      RESPIRATORY:  Negative for dyspnea and cough.      GASTROINTESTINAL:  Negative for abdominal pain, heartburn, constipation, diarrhea, and stool changes.      MUSCULOSKELETAL:  Positive for limb pain ( bilateral leg pain; RIGHT LEG WORSE ) and right shoulder occasionally.      NEUROLOGICAL:  Positive for paresthesias.          Past Medical History / Family History / Social History:         Past Medical History:             PAST MEDICAL HISTORY         Positive for    Sleep Apnea;     Positive for    barretts esophagus;     Positive for    testosterone deficiency;     Positive for    sciatica;         PREVENTIVE HEALTH MAINTENANCE             COLORECTAL CANCER SCREENING: 3-4 years ago - ([facility] )         Surgical History:         Tonsillectomy     right knee replacement 2017;         Family History:     Father: colon  polyps;  Transient Ischemic Attack     Mother: Hypertension;  Type 2 Diabetes     Sister(s): Hypertension;  Rheumatoid Arthritis         Social History:     Occupation:      Marital Status:      Children: 2 children         Tobacco/Alcohol/Supplements:     Last Reviewed on 11/13/2019 09:32 AM by Spurling, Sarah C    Tobacco: He has a past history of cigarette smoking; quit date:  Over 35 years ago..          Alcohol: Frequency:    One beer a week.;     Caffeine:  He admits to consuming caffeine via soda ( -Maybe a couple of sodas a week. ).          Substance Abuse History:     None         Mental Health History:         Bipolar Disorder ( with depression )         Current Problems:     Hyperlipidemia, unspecified    Bipolar disorder, current episode depressed, mild or moderate severity, unspecified    Sleep apnea, unspecified    Essential (primary) hypertension    Abreu's esophagus with dysplasia, unspecified    Low back pain    Encounter for screening for depression    Encounter for screening for cardiovascular disorders        Immunizations:     None        Allergies:     Last Reviewed on 11/13/2019 09:32 AM by Spurling, Sarah C    No Known Allergies.        Current Medications:     Last Reviewed on 11/13/2019 09:32 AM by Spurling, Sarah C    FENOFIBRATE  TAB 160MG    MELOXICAM    TAB 15MG    LISINOPRIL   TAB 20MG    LAMOTRIGINE  TAB 200MG        Objective:        Vitals:         Current: 11/13/2019 9:36:12 AM    Ht:  5 ft, 9 in;  Wt: 274.2 lbs;  BMI: 40.5T: 98.5 F (oral);  BP: 132/79 mm Hg (left arm, sitting);  P: 79 bpm (left arm (BP Cuff), sitting)        Exams:     PHYSICAL EXAM:     GENERAL: Vitals recorded well developed, well nourished;  well groomed;  no apparent distress;     NECK:  supple, full ROM; no thyromegaly; no carotid bruits;     RESPIRATORY: normal respiratory rate and pattern with no distress; normal breath sounds with no rales, rhonchi, wheezes or rubs;     CARDIOVASCULAR:  normal rate; rhythm is regular;  normal S1; normal S2; no systolic murmur; no cyanosis; no edema;     MUSCULOSKELETAL:  Normal range of motion, strength and tone;     NEUROLOGICAL:  cranial nerves, motor and sensory function, reflexes, gait and coordination are all intact;     PSYCHIATRIC:  appropriate affect and demeanor; normal speech pattern; grossly normal memory;         Assessment:         Z13.31   Encounter for screening for depression       M54.5   Low back pain       Z13.6   Encounter for screening for cardiovascular disorders           ORDERS:         Lab Orders:       [id number]  [facility] PHYSICAL: CMP, CBC, TSH, LIPID: [id number], [id number], [id number]  (Send-Out)              Procedures Ordered:       REFER  Referral to Specialist or Other Facility  (Send-Out)              Other Orders:         Depression screen positive and follow up plan documented  (In-House)                      Plan:         Encounter for screening for depression    MIPS PHQ-9 Depression Screening: Completed form scanned and in chart; Total Score 5 Positive Depression Screen: Stable on medications. No suicidal ideation.            Orders:         Depression screen positive and follow up plan documented  (In-House)              Low back painalready taking meloxicam, refer to [facility]  may need imaging if no improvement        REFERRALS:  Referral initiated to physical therapy ( [facility] ).            Orders:       REFER  Referral to Specialist or Other Facility  (Send-Out)              Encounter for screening for cardiovascular disorders    LABORATORY:  Labs ordered to be performed today include PHYSICAL PANEL; CMP, CBC, TSH, LIPID.            Orders:       [id number]  [facility] PHYSICAL: CMP, CBC, TSH, LIPID: [id number], [id number], [id number]  (Send-Out)                  Charge Capture:         Primary Diagnosis:     Z13.31  Encounter for screening for depression           Orders:      [id number]  Office visit - new pt, level 2  (In-House)               Depression screen positive and follow up plan documented  (In-House)              M54.5  Low back pain     Z13.6  Encounter for screening for cardiovascular disorders         ADDENDUMS:      ____________________________________    Addendum: 11/21/2019 09:51 PM - Mercedes Armstrong        add office visit code [id number]

## 2021-05-18 NOTE — PROGRESS NOTES
Anthnoy Brown  1965     Office/Outpatient Visit    Visit Date: Fri, Dec 27, 2019 03:44 pm    Provider: Mercedes Armstrong N.P. (Assistant: Evelyne Cruz MA)    Location: [facility]        Electronically signed by Mercedes Armstrong N.P. on  12/29/2019 09:12:25 PM                             Subjective:        CC: Jeremy is a 54 year old White male.  This is a follow-up visit.  CHECK UP         HPI:           Patient to be evaluated for low back pain.  The location is primarily in the lumbar spine.  The pain radiates to the right anterior thigh.  This is a chronic, but intermittent problem with an acute exacerbation.  OTHER (enter) had epidural in the past - 3 full rounds   Last injections were 2016  [facility] pain management - interested in going back     ROS:     CONSTITUTIONAL:  Negative for chills, fatigue and fever.      CARDIOVASCULAR:  Negative for chest pain and pedal edema.      RESPIRATORY:  Negative for recent cough and dyspnea.      MUSCULOSKELETAL:  Positive for back pain and limb pain ( right leg pain ).   Negative for arthralgias.      NEUROLOGICAL:  Positive for paresthesia ( right lower extremity ).          Past Medical History / Family History / Social History:         Past Medical History:             PAST MEDICAL HISTORY         Positive for    Sleep Apnea;     Positive for    barretts esophagus;     Positive for    testosterone deficiency;     Positive for    sciatica;         PREVENTIVE HEALTH MAINTENANCE             COLORECTAL CANCER SCREENING: 3-4 years ago - ([facility] )         Surgical History:         Tonsillectomy     right knee replacement 2017;         Family History:     Father: colon polyps;  Transient Ischemic Attack     Mother: Hypertension;  Type 2 Diabetes     Sister(s): Hypertension;  Rheumatoid Arthritis         Social History:     Occupation:      Marital Status:      Children: 2 children         Tobacco/Alcohol/Supplements:      Last Reviewed on 11/13/2019 09:32 AM by Spurling, Sarah C    Tobacco: He has a past history of cigarette smoking; quit date:  Over 35 years ago..          Alcohol: Frequency:    One beer a week.;     Caffeine:  He admits to consuming caffeine via soda ( -Maybe a couple of sodas a week. ).          Substance Abuse History:     None         Mental Health History:         Bipolar Disorder ( with depression )         Current Problems:     Hyperlipidemia, unspecified    Bipolar disorder, current episode depressed, mild or moderate severity, unspecified    Sleep apnea, unspecified    Essential (primary) hypertension    Abreu's esophagus with dysplasia, unspecified    Low back pain    Encounter for screening for depression    Encounter for screening for cardiovascular disorders        Immunizations:     None        Allergies:     Last Reviewed on 11/13/2019 09:32 AM by Spurling, Sarah C    No Known Allergies.        Current Medications:     Last Reviewed on 11/13/2019 09:32 AM by Spurling, Sarah C    FENOFIBRATE  TAB 160MG    MELOXICAM    TAB 15MG    LISINOPRIL   TAB 20MG    LAMOTRIGINE  TAB 200MG        Objective:        Vitals:         Current: 12/27/2019 3:49:13 PM    Ht:  5 ft, 9 in;  Wt: 280.8 lbs;  BMI: 41.5T: 99.1 F (oral);  BP: 131/86 mm Hg (left arm, sitting);  P: 77 bpm (left arm (BP Cuff), sitting);  sCr: 1.21 mg/dL;  GFR: 81.61        Exams:     PHYSICAL EXAM:     GENERAL: Vitals recorded well developed, well nourished;  no apparent distress;     RESPIRATORY: normal appearance and symmetric expansion of chest wall; normal respiratory rate and pattern with no distress; normal breath sounds with no rales, rhonchi, wheezes or rubs;     CARDIOVASCULAR: normal rate; rhythm is regular;  no edema;     LYMPHATIC: no enlargement of cervical or facial nodes; no supraclavicular nodes;     MUSCULOSKELETAL: normal gait; normal range of motion of all major muscle groups; pain with range of motion in: the back;      NEUROLOGIC: mental status: alert and oriented x 3;     PSYCHIATRIC: appropriate affect and demeanor; normal speech pattern; normal thought and perception;         Assessment:         M54.5   Low back pain       E29.1   Testicular hypofunction       K22.719   Abreu's esophagus with dysplasia, unspecified       Z12.11   Encounter for screening for malignant neoplasm of colon           ORDERS:         Meds Prescribed:       [New Rx] omeprazole 20 mg oral capsule,delayed release (enteric coated) [take 1 capsule (20 mg) by oral route daily ], #30 (thirty) capsules, Refills: 5 (five)         Lab Orders:       FUTURE  Future order to be done at patients convenience  (Send-Out)            [id number]  [facility]              Procedures Ordered:       REFER  Referral to Specialist or Other Facility  (Send-Out)                      Plan:         Low back painhas had some improvment with the intensity but still sensitive to touch to his anterior thigh  e may need an updated MRI as we discussed today, however, he is a previous patient of [facility] - discussed that the next option would be neurosurgery-  he would opt to have pain management at this time        Testicular hypofunctionrequesting to have his levels checked again - previously on supplements and wants to see if he should resume this         FOLLOW-UP TESTING #1: FOLLOW-UP LABORATORY:  Labs to be scheduled in the future include Testosterone, TOTAL.            Orders:       FUTURE  Future order to be done at patients convenience  (Send-Out)            [id number]  [facility])              Abreu's esophagus with dysplasia, unspecified          Prescriptions:       [New Rx] omeprazole 20 mg oral capsule,delayed release (enteric coated) [take 1 capsule (20 mg) by oral route daily ], #30 (thirty) capsules, Refills: 5 (five)         Encounter for screening for malignant neoplasm of colonneeds colonoscopy and EGD           REFERRALS:  Referral initiated to a general surgeon ( a colonoscopy ).            Orders:       REFER  Referral to Specialist or Other Facility  (Send-Out)                  Patient Recommendations:        For  Testicular hypofunction:            The following laboratory testing has been ordered:             Charge Capture:         Primary Diagnosis:     M54.5  Low back pain           Orders:      [id number]  Office/outpatient visit; established patient, level 3  (In-House)              E29.1  Testicular hypofunction     K22.719  Abreu's esophagus with dysplasia, unspecified     Z12.11  Encounter for screening for malignant neoplasm of colon         ADDENDUMS:      ____________________________________    Addendum: 01/22/2020 11:36 AM - One, Team         Visit Note Faxed to:        Percy Francisco  (General Surgery); Number [phone]

## 2021-06-07 RX ORDER — MELOXICAM 15 MG/1
TABLET ORAL
Qty: 90 TABLET | Refills: 3 | Status: SHIPPED | OUTPATIENT
Start: 2021-06-07 | End: 2021-08-10

## 2021-07-01 VITALS
SYSTOLIC BLOOD PRESSURE: 132 MMHG | BODY MASS INDEX: 40.61 KG/M2 | DIASTOLIC BLOOD PRESSURE: 79 MMHG | WEIGHT: 274.2 LBS | TEMPERATURE: 98.5 F | HEART RATE: 79 BPM | HEIGHT: 69 IN

## 2021-07-02 VITALS
HEART RATE: 66 BPM | DIASTOLIC BLOOD PRESSURE: 77 MMHG | TEMPERATURE: 97.7 F | BODY MASS INDEX: 41.71 KG/M2 | SYSTOLIC BLOOD PRESSURE: 128 MMHG | HEIGHT: 69 IN | WEIGHT: 281.6 LBS

## 2021-07-02 VITALS
HEIGHT: 69 IN | BODY MASS INDEX: 41.59 KG/M2 | SYSTOLIC BLOOD PRESSURE: 131 MMHG | HEART RATE: 77 BPM | WEIGHT: 280.8 LBS | DIASTOLIC BLOOD PRESSURE: 86 MMHG | TEMPERATURE: 99.1 F

## 2021-07-02 VITALS
HEIGHT: 69 IN | BODY MASS INDEX: 41.47 KG/M2 | SYSTOLIC BLOOD PRESSURE: 142 MMHG | HEART RATE: 78 BPM | WEIGHT: 280 LBS | DIASTOLIC BLOOD PRESSURE: 86 MMHG | TEMPERATURE: 97.6 F

## 2021-07-02 VITALS
HEIGHT: 69 IN | WEIGHT: 289.1 LBS | SYSTOLIC BLOOD PRESSURE: 130 MMHG | BODY MASS INDEX: 42.82 KG/M2 | DIASTOLIC BLOOD PRESSURE: 76 MMHG | HEART RATE: 80 BPM | TEMPERATURE: 96.4 F

## 2021-07-02 VITALS
HEIGHT: 69 IN | DIASTOLIC BLOOD PRESSURE: 76 MMHG | WEIGHT: 280.5 LBS | TEMPERATURE: 97.3 F | HEART RATE: 79 BPM | OXYGEN SATURATION: 98 % | BODY MASS INDEX: 41.54 KG/M2 | SYSTOLIC BLOOD PRESSURE: 138 MMHG

## 2021-07-02 DIAGNOSIS — K22.719 BARRETT'S ESOPHAGUS WITH DYSPLASIA, UNSPECIFIED: ICD-10-CM

## 2021-07-02 RX ORDER — OMEPRAZOLE 20 MG/1
20 CAPSULE, DELAYED RELEASE ORAL DAILY
Qty: 90 CAPSULE | Refills: 0 | Status: SHIPPED | OUTPATIENT
Start: 2021-07-02 | End: 2021-08-10 | Stop reason: SDUPTHER

## 2021-08-02 ENCOUNTER — TELEPHONE (OUTPATIENT)
Dept: FAMILY MEDICINE CLINIC | Age: 56
End: 2021-08-02

## 2021-08-02 RX ORDER — LAMOTRIGINE 200 MG/1
200 TABLET ORAL DAILY
Status: CANCELLED | OUTPATIENT
Start: 2021-08-02

## 2021-08-02 NOTE — TELEPHONE ENCOUNTER
Pt's wife inf that we are not the prescriber on this med she said he is transferring to us I informed her that he will need to get enough med until his appt from current physican

## 2021-08-02 NOTE — TELEPHONE ENCOUNTER
Caller: Yareli Brown    Relationship: Emergency Contact    Best call back number: [phone]    Medication needed:   Requested Prescriptions     Pending Prescriptions Disp Refills   • lamoTRIgine (LaMICtal) 200 MG tablet       Sig: Take 1 tablet by mouth Daily.       When do you need the refill by: BY 09.01.2021    What additional details did the patient provide when requesting the medication:   PATIENT NEEDS A SMALLER REFILL OF THIS MEDICATION TO HOLD HIM OVER UNTIL HIS APPOINTMENT WITH MOUSER ON 09.07.2021.    Does the patient have less than a 3 day supply:  [] Yes  [x] No    What is the patient's preferred pharmacy: [facility] #[id number] - [place], [place] - [address] [phone] [facility] [phone] FX

## 2021-08-10 ENCOUNTER — OFFICE VISIT (OUTPATIENT)
Dept: FAMILY MEDICINE CLINIC | Age: 56
End: 2021-08-10

## 2021-08-10 VITALS
HEIGHT: 69 IN | HEART RATE: 70 BPM | DIASTOLIC BLOOD PRESSURE: 74 MMHG | BODY MASS INDEX: 42 KG/M2 | SYSTOLIC BLOOD PRESSURE: 136 MMHG | WEIGHT: 283.6 LBS | TEMPERATURE: 98.6 F

## 2021-08-10 DIAGNOSIS — M17.11 PRIMARY OSTEOARTHRITIS OF RIGHT KNEE: ICD-10-CM

## 2021-08-10 DIAGNOSIS — I10 ESSENTIAL HYPERTENSION: ICD-10-CM

## 2021-08-10 DIAGNOSIS — Z13.6 SCREENING FOR CARDIOVASCULAR CONDITION: ICD-10-CM

## 2021-08-10 DIAGNOSIS — F31.76 BIPOLAR DISORDER, IN FULL REMISSION, MOST RECENT EPISODE DEPRESSED (HCC): Primary | ICD-10-CM

## 2021-08-10 DIAGNOSIS — K22.719 BARRETT'S ESOPHAGUS WITH DYSPLASIA, UNSPECIFIED: ICD-10-CM

## 2021-08-10 DIAGNOSIS — E78.00 HIGH CHOLESTEROL: ICD-10-CM

## 2021-08-10 PROCEDURE — 99214 OFFICE O/P EST MOD 30 MIN: CPT | Performed by: NURSE PRACTITIONER

## 2021-08-10 RX ORDER — FENOFIBRATE 160 MG/1
160 TABLET ORAL DAILY
Qty: 90 TABLET | Refills: 2 | Status: SHIPPED | OUTPATIENT
Start: 2021-08-10 | End: 2022-03-22 | Stop reason: SDUPTHER

## 2021-08-10 RX ORDER — CELECOXIB 200 MG/1
200 CAPSULE ORAL DAILY
COMMUNITY
End: 2021-08-10 | Stop reason: SDUPTHER

## 2021-08-10 RX ORDER — CELECOXIB 200 MG/1
200 CAPSULE ORAL DAILY
Qty: 90 CAPSULE | Refills: 3 | Status: SHIPPED | OUTPATIENT
Start: 2021-08-10 | End: 2021-11-24 | Stop reason: SDUPTHER

## 2021-08-10 RX ORDER — LAMOTRIGINE 200 MG/1
200 TABLET ORAL DAILY
Qty: 90 TABLET | Refills: 3 | Status: SHIPPED | OUTPATIENT
Start: 2021-08-10 | End: 2022-08-16 | Stop reason: SDUPTHER

## 2021-08-10 RX ORDER — OMEPRAZOLE 20 MG/1
20 CAPSULE, DELAYED RELEASE ORAL DAILY
Qty: 90 CAPSULE | Refills: 0 | Status: SHIPPED | OUTPATIENT
Start: 2021-08-10 | End: 2021-11-24 | Stop reason: SDUPTHER

## 2021-08-10 RX ORDER — LISINOPRIL 20 MG/1
20 TABLET ORAL DAILY
Qty: 90 TABLET | Refills: 3 | Status: SHIPPED | OUTPATIENT
Start: 2021-08-10 | End: 2022-02-10

## 2021-08-10 NOTE — PROGRESS NOTES
Chief Complaint  Anthony Brown presents to [facility] for Hypertension (med refill )    Subjective          Anthony presents for follow up on hypertension.  Compliance with medication is reported as good   Check of BP at home reported as well controlled.  No new concerns or problems to report.    Also, follow up on bipolar  Reports that lamictal is doing well on current dose.  Prior history of what he could consider breakdown in the past.  Bipolar with depression.  His previous provider is no longer writing for refills          Review of Systems   Constitutional: Negative for fatigue and fever.   Respiratory: Negative for shortness of breath.    Cardiovascular: Negative for chest pain.   Psychiatric/Behavioral: Negative for dysphoric mood. The patient is not nervous/anxious.          No Known Allergies   Past Medical History:   Diagnosis Date   • Acute URI    • Arthritis     bialteral knees   • Abreu's esophagus with dysplasia, unspecified    • Bipolar disorder (CMS/HCC)    • Contact with and (suspected) exposure to other viral communicable diseases    • Depression    • High cholesterol    • History of chest pain     6 YEARS AGO, HAD STRESS TEST NORMAL   • Hypertension    • Low back pain    • Low testosterone level in male     HAS TESTOSTERONE PELLETS IMPLANTED   • Pain in joint     UNSPECIFIED   • Sciatica    • Sinusitis    • Sleep apnea with use of continuous positive airway pressure (CPAP)    • Sleep apnea, unspecified    • SOB (shortness of breath)    • Testicular hypofunction    • Testosterone deficiency      Current Outpatient Medications   Medication Sig Dispense Refill   • celecoxib (CeleBREX) 200 MG capsule Take 1 capsule by mouth Daily for 90 days. 90 capsule 3   • Cholecalciferol (VITAMIN D3) 5000 UNITS capsule capsule Take 2,000 Units by mouth Daily.     • Cyanocobalamin (B-12) 5000 MCG sublingual tablet Place 5,000 mcg under the tongue Daily.     • fenofibrate 160 MG  tablet Take 1 tablet by mouth Daily for 90 days. 90 tablet 3   • lamoTRIgine (LaMICtal) 200 MG tablet Take 1 tablet by mouth Daily for 90 days. 90 tablet 3   • lisinopril (PRINIVIL,ZESTRIL) 20 MG tablet Take 1 tablet by mouth Daily for 90 days. 90 tablet 3   • omeprazole (priLOSEC) 20 MG capsule Take 1 capsule by mouth Daily for 90 days. Needs appt  before next refill 90 capsule 0   • Probiotic Product (PROBIOTIC PO) Take 1 capsule by mouth Daily. PT TO HOLD MED PRIOR TO OR     • Testosterone 200 MG pellet by Implant route. 9 PELLETS IN BUTTOCK       No current facility-administered medications for this visit.     Past Surgical History:   Procedure Laterality Date   • HERNIA REPAIR     • KNEE SURGERY Right    • OK TOTAL KNEE ARTHROPLASTY N/A 2017    Procedure: RIGHT TOTAL KNEE ARTHROPLASTY, LEFT KNEE INJECTED WITH CORTISONE;  Surgeon: Bradford Torres MD;  Location: [facility];  Service: Orthopedics   • TONSILLECTOMY        Social History     Tobacco Use   • Smoking status: Former Smoker     Packs/day: 0.25     Years: 3.00     Pack years: 0.75     Types: Cigarettes     Quit date:      Years since quittin.6   • Smokeless tobacco: Never Used   • Tobacco comment: smoked as a teenager   Substance Use Topics   • Alcohol use: Yes     Alcohol/week: 3.0 standard drinks     Types: 3 Cans of beer per week     Comment: socially   • Drug use: No     Family History   Problem Relation Age of Onset   • Diabetes Mother    • Hypertension Mother    • Colon polyps Father    • Stroke Father    • Hypertension Sister    • Rheum arthritis Sister    • Malig Hyperthermia Neg Hx      Health Maintenance Due   Topic Date Due   • ANNUAL PHYSICAL  Never done   • ZOSTER VACCINE (1 of 2) Never done   • HEPATITIS C SCREENING  Never done   • LIPID PANEL  Never done      Immunization History   Administered Date(s) Administered   • TD Preservative Free 03/10/2017        Objective     Vitals:    08/10/21 0914   BP: 136/74   BP  "Location: Left arm   Patient Position: Sitting   Pulse: 70   Temp: 98.6 °F (37 °C)   Weight: 129 kg (283 lb 9.6 oz)   Height: 175.3 cm (69.02\")     Body mass index is 41.86 kg/m².     Physical Exam  Vitals reviewed.   Constitutional:       Appearance: Normal appearance.   HENT:      Head: Normocephalic.   Eyes:      Pupils: Pupils are equal, round, and reactive to light.   Cardiovascular:      Rate and Rhythm: Normal rate and regular rhythm.      Heart sounds: No murmur heard.     Pulmonary:      Effort: Pulmonary effort is normal.      Breath sounds: Normal breath sounds.   Musculoskeletal:         General: Normal range of motion.   Neurological:      Mental Status: He is alert.   Psychiatric:         Mood and Affect: Mood is anxious.         Behavior: Behavior normal.           Result Review :                           Assessment and Plan      Diagnoses and all orders for this visit:    1. Bipolar disorder, in full remission, most recent episode depressed (CMS/HCC) (Primary)  -     lamoTRIgine (LaMICtal) 200 MG tablet; Take 1 tablet by mouth Daily for 90 days.  Dispense: 90 tablet; Refill: 3    2. Essential hypertension  Comments:  continue current treatment         Orders:  -     lisinopril (PRINIVIL,ZESTRIL) 20 MG tablet; Take 1 tablet by mouth Daily for 90 days.  Dispense: 90 tablet; Refill: 3    3. Abreu's esophagus with dysplasia, unspecified  Comments:  Continue daily treatment as recommended  Orders:  -     omeprazole (priLOSEC) 20 MG capsule; Take 1 capsule by mouth Daily for 90 days. Needs appt  before next refill  Dispense: 90 capsule; Refill: 0    4. Primary osteoarthritis of right knee  Comments:  continue current treatment.  Follow up with Dr. Rubio as recommended   Orders:  -     celecoxib (CeleBREX) 200 MG capsule; Take 1 capsule by mouth Daily for 90 days.  Dispense: 90 capsule; Refill: 3    5. High cholesterol  Comments:  continue current treatment   Orders:  -     fenofibrate 160 MG tablet; " Take 1 tablet by mouth Daily for 90 days.  Dispense: 90 tablet; Refill: 3    6. Screening for cardiovascular condition  -     Comprehensive Metabolic Panel; Future  -     CBC & Differential; Future  -     TSH; Future  -     Lipid Panel; Future              Follow Up     No follow-ups on file.    Patient was given instructions and counseling regarding his condition or for health maintenance advice. Please see specific information pulled into the AVS if appropriate.

## 2021-08-11 ENCOUNTER — DOCUMENTATION (OUTPATIENT)
Dept: FAMILY MEDICINE CLINIC | Age: 56
End: 2021-08-11

## 2021-09-02 ENCOUNTER — LAB (OUTPATIENT)
Dept: LAB | Facility: HOSPITAL | Age: 56
End: 2021-09-02

## 2021-09-02 DIAGNOSIS — Z13.6 SCREENING FOR CARDIOVASCULAR CONDITION: ICD-10-CM

## 2021-09-02 LAB
ALBUMIN SERPL-MCNC: 4.2 G/DL (ref 3.5–5.2)
ALBUMIN/GLOB SERPL: 1.6 G/DL
ALP SERPL-CCNC: 76 U/L (ref 39–117)
ALT SERPL W P-5'-P-CCNC: 33 U/L (ref 1–41)
ANION GAP SERPL CALCULATED.3IONS-SCNC: 9.6 MMOL/L (ref 5–15)
AST SERPL-CCNC: 22 U/L (ref 1–40)
BASOPHILS # BLD AUTO: 0.03 10*3/MM3 (ref 0–0.2)
BASOPHILS NFR BLD AUTO: 0.6 % (ref 0–1.5)
BILIRUB SERPL-MCNC: 0.2 MG/DL (ref 0–1.2)
BUN SERPL-MCNC: 19 MG/DL (ref 6–20)
BUN/CREAT SERPL: 18.3 (ref 7–25)
CALCIUM SPEC-SCNC: 9.6 MG/DL (ref 8.6–10.5)
CHLORIDE SERPL-SCNC: 104 MMOL/L (ref 98–107)
CHOLEST SERPL-MCNC: 183 MG/DL (ref 0–200)
CO2 SERPL-SCNC: 24.4 MMOL/L (ref 22–29)
CREAT SERPL-MCNC: 1.04 MG/DL (ref 0.76–1.27)
DEPRECATED RDW RBC AUTO: 41.1 FL (ref 37–54)
EOSINOPHIL # BLD AUTO: 0.09 10*3/MM3 (ref 0–0.4)
EOSINOPHIL NFR BLD AUTO: 1.8 % (ref 0.3–6.2)
ERYTHROCYTE [DISTWIDTH] IN BLOOD BY AUTOMATED COUNT: 13.1 % (ref 12.3–15.4)
GFR SERPL CREATININE-BSD FRML MDRD: 74 ML/MIN/1.73
GLOBULIN UR ELPH-MCNC: 2.7 GM/DL
GLUCOSE SERPL-MCNC: 109 MG/DL (ref 65–99)
HCT VFR BLD AUTO: 42.6 % (ref 37.5–51)
HDLC SERPL-MCNC: 23 MG/DL (ref 40–60)
HGB BLD-MCNC: 14.8 G/DL (ref 13–17.7)
IMM GRANULOCYTES # BLD AUTO: 0.03 10*3/MM3 (ref 0–0.05)
IMM GRANULOCYTES NFR BLD AUTO: 0.6 % (ref 0–0.5)
LDLC SERPL CALC-MCNC: 97 MG/DL (ref 0–100)
LDLC/HDLC SERPL: 3.71 {RATIO}
LYMPHOCYTES # BLD AUTO: 1.59 10*3/MM3 (ref 0.7–3.1)
LYMPHOCYTES NFR BLD AUTO: 31.5 % (ref 19.6–45.3)
MCH RBC QN AUTO: 30 PG (ref 26.6–33)
MCHC RBC AUTO-ENTMCNC: 34.7 G/DL (ref 31.5–35.7)
MCV RBC AUTO: 86.4 FL (ref 79–97)
MONOCYTES # BLD AUTO: 0.51 10*3/MM3 (ref 0.1–0.9)
MONOCYTES NFR BLD AUTO: 10.1 % (ref 5–12)
NEUTROPHILS NFR BLD AUTO: 2.79 10*3/MM3 (ref 1.7–7)
NEUTROPHILS NFR BLD AUTO: 55.4 % (ref 42.7–76)
PLATELET # BLD AUTO: 237 10*3/MM3 (ref 140–450)
PMV BLD AUTO: 10.2 FL (ref 6–12)
POTASSIUM SERPL-SCNC: 3.7 MMOL/L (ref 3.5–5.2)
PROT SERPL-MCNC: 6.9 G/DL (ref 6–8.5)
RBC # BLD AUTO: 4.93 10*6/MM3 (ref 4.14–5.8)
SODIUM SERPL-SCNC: 138 MMOL/L (ref 136–145)
TRIGL SERPL-MCNC: 373 MG/DL (ref 0–150)
TSH SERPL DL<=0.05 MIU/L-ACNC: 1.39 UIU/ML (ref 0.27–4.2)
VLDLC SERPL-MCNC: 63 MG/DL (ref 5–40)
WBC # BLD AUTO: 5.04 10*3/MM3 (ref 3.4–10.8)

## 2021-09-02 PROCEDURE — 80053 COMPREHEN METABOLIC PANEL: CPT

## 2021-09-02 PROCEDURE — 84443 ASSAY THYROID STIM HORMONE: CPT

## 2021-09-02 PROCEDURE — 36415 COLL VENOUS BLD VENIPUNCTURE: CPT

## 2021-09-02 PROCEDURE — 80061 LIPID PANEL: CPT

## 2021-09-02 PROCEDURE — 85025 COMPLETE CBC W/AUTO DIFF WBC: CPT

## 2021-09-07 ENCOUNTER — OFFICE VISIT (OUTPATIENT)
Dept: FAMILY MEDICINE CLINIC | Age: 56
End: 2021-09-07

## 2021-09-07 VITALS
WEIGHT: 286.4 LBS | BODY MASS INDEX: 42.42 KG/M2 | SYSTOLIC BLOOD PRESSURE: 122 MMHG | HEART RATE: 64 BPM | HEIGHT: 69 IN | TEMPERATURE: 98.8 F | DIASTOLIC BLOOD PRESSURE: 71 MMHG

## 2021-09-07 DIAGNOSIS — E78.00 HIGH CHOLESTEROL: ICD-10-CM

## 2021-09-07 DIAGNOSIS — G47.30 SLEEP APNEA WITH USE OF CONTINUOUS POSITIVE AIRWAY PRESSURE (CPAP): ICD-10-CM

## 2021-09-07 DIAGNOSIS — Z11.59 SCREENING FOR VIRAL DISEASE: ICD-10-CM

## 2021-09-07 DIAGNOSIS — E34.9 TESTOSTERONE DEFICIENCY: ICD-10-CM

## 2021-09-07 DIAGNOSIS — G89.29 OTHER CHRONIC PAIN: ICD-10-CM

## 2021-09-07 DIAGNOSIS — M17.11 PRIMARY OSTEOARTHRITIS OF RIGHT KNEE: ICD-10-CM

## 2021-09-07 DIAGNOSIS — K22.719 BARRETT'S ESOPHAGUS WITH DYSPLASIA, UNSPECIFIED: ICD-10-CM

## 2021-09-07 DIAGNOSIS — F31.76 BIPOLAR DISORDER, IN FULL REMISSION, MOST RECENT EPISODE DEPRESSED (HCC): ICD-10-CM

## 2021-09-07 DIAGNOSIS — Z00.00 ROUTINE GENERAL MEDICAL EXAMINATION AT A HEALTH CARE FACILITY: Primary | ICD-10-CM

## 2021-09-07 DIAGNOSIS — F33.42 RECURRENT MAJOR DEPRESSIVE DISORDER, IN FULL REMISSION (HCC): ICD-10-CM

## 2021-09-07 DIAGNOSIS — I10 ESSENTIAL HYPERTENSION: ICD-10-CM

## 2021-09-07 DIAGNOSIS — R21 RASH: ICD-10-CM

## 2021-09-07 PROCEDURE — 99396 PREV VISIT EST AGE 40-64: CPT | Performed by: NURSE PRACTITIONER

## 2021-09-07 RX ORDER — TRIAMCINOLONE ACETONIDE 1 MG/G
CREAM TOPICAL 2 TIMES DAILY
Qty: 45 G | Refills: 0 | Status: SHIPPED | OUTPATIENT
Start: 2021-09-07 | End: 2021-09-17

## 2021-09-07 RX ORDER — FLUTICASONE PROPIONATE 50 MCG
2 SPRAY, SUSPENSION (ML) NASAL DAILY
COMMUNITY
End: 2021-10-20 | Stop reason: SDUPTHER

## 2021-09-07 NOTE — ASSESSMENT & PLAN NOTE
Previously treated with testosterone pellets.  Feeling like he is ok for now.  Prior treatment caused some problems.

## 2021-09-07 NOTE — ASSESSMENT & PLAN NOTE
Lipid abnormalities are unchanged.  Nutritional counseling was provided. and Pharmacotherapy as ordered.  Lipids will be reassessed in 6 months.  Taking fenofibrate, trigs elevated

## 2021-09-07 NOTE — ASSESSMENT & PLAN NOTE
Hypertension is unchanged.  Continue current treatment regimen.  Weight loss.  Regular aerobic exercise.  Continue current medications.  Ambulatory blood pressure monitoring.  Blood pressure will be reassessed at the next regular appointment.    Taking lisinopril daily

## 2021-09-07 NOTE — ASSESSMENT & PLAN NOTE
Psychological condition is improving with treatment.  Continue current treatment regimen.  Psychological condition  will be reassessed at the next regular appointment.    Taking lamictal 200 daily

## 2021-09-07 NOTE — PROGRESS NOTES
Chief Complaint  Anthony Brown presents to [facility] for Annual Exam (check up )    Subjective          Anthony is here today for annual exam.  Last annual exam was ? year ago.     Immunization status:  Needs   Last eye exam:   2 weeks  - [facility] [place]   Last dental exam:   Scheduled next week - [facility] [place] [facility]   Smoking status:   Social History    Tobacco Use      Smoking status: Former Smoker        Packs/day: 0.25        Years: 3.00        Pack years: .75        Types: Cigarettes        Quit date:         Years since quittin.7      Tobacco comment: smoked as a teenager     Alcohol use:  Social History    Substance and Sexual Activity      Alcohol use: Yes        Alcohol/week: 3.0 standard drinks        Types: 3 Cans of beer per week        Comment: socially      Recreational drug use:   Social History    Substance and Sexual Activity      Drug use: No     Diet / exercise:   no  Sexually active:   y       right shoulder/pectoral / axillary pain;   improved with NSAIDs -has had for 20 years - prior work up but no MRI;  occasionally wiill cause debility and cause arm to draw up;  will occur a few times per month ;  no prior injury    Rash  On ankles  Dry pruritic  Up on outer left outer leg      Knot on back  Has been squeezed and some 'stuff' removed but returns.  No pain, no itching,  Has been present for about 1 1/2 year.  Occasionally will clear up     Will have problems breathing when on left side in bed  Will improve with moving positions.              Review of Systems   Constitutional: Negative for chills, diaphoresis, fatigue and fever.   HENT: Negative for congestion, ear pain, rhinorrhea and sore throat.    Eyes: Negative for blurred vision, redness and itching.   Respiratory: Positive for shortness of breath (when lying down for bed). Negative for cough, chest tightness and wheezing.    Cardiovascular: Negative for chest pain, palpitations and leg  swelling.   Gastrointestinal: Positive for diarrhea (chronic). Negative for abdominal pain, constipation, nausea and vomiting.   Endocrine: Negative for polyuria.   Genitourinary: Negative for difficulty urinating, dysuria, frequency, nocturia, urgency and urinary incontinence.   Musculoskeletal: Positive for arthralgias. Negative for back pain, joint swelling, myalgias, neck pain and neck stiffness.   Skin: Negative for rash.        Left ring finger with injury from screw   Neurological: Negative for dizziness, weakness, numbness and headache.   Psychiatric/Behavioral: Positive for depressed mood (improved on medication). The patient is nervous/anxious (improved on medication ).          No Known Allergies   Past Medical History:   Diagnosis Date   • Acute URI    • Arthritis     bialteral knees   • Abreu's esophagus with dysplasia, unspecified    • Bipolar disorder (CMS/HCC)    • Contact with and (suspected) exposure to other viral communicable diseases    • Depression    • High cholesterol    • History of chest pain     6 YEARS AGO, HAD STRESS TEST NORMAL   • Hypertension    • Low back pain    • Low testosterone level in male     HAS TESTOSTERONE PELLETS IMPLANTED   • Pain in joint     UNSPECIFIED   • Sciatica    • Sinusitis    • Sleep apnea with use of continuous positive airway pressure (CPAP)    • Sleep apnea, unspecified    • SOB (shortness of breath)    • Testicular hypofunction    • Testosterone deficiency      Current Outpatient Medications   Medication Sig Dispense Refill   • celecoxib (CeleBREX) 200 MG capsule Take 1 capsule by mouth Daily for 90 days. 90 capsule 3   • Cholecalciferol (VITAMIN D3) 5000 UNITS capsule capsule Take 2,000 Units by mouth Daily.     • Cyanocobalamin (B-12) 5000 MCG sublingual tablet Place 5,000 mcg under the tongue Daily.     • fenofibrate 160 MG tablet Take 1 tablet by mouth Daily for 90 days. 90 tablet 3   • fluticasone (FLONASE) 50 MCG/ACT nasal spray 2 sprays into the  nostril(s) as directed by provider Daily.     • lamoTRIgine (LaMICtal) 200 MG tablet Take 1 tablet by mouth Daily for 90 days. 90 tablet 3   • lisinopril (PRINIVIL,ZESTRIL) 20 MG tablet Take 1 tablet by mouth Daily for 90 days. 90 tablet 3   • omeprazole (priLOSEC) 20 MG capsule Take 1 capsule by mouth Daily for 90 days. Needs appt  before next refill 90 capsule 0   • Probiotic Product (PROBIOTIC PO) Take 1 capsule by mouth Daily. PT TO HOLD MED PRIOR TO OR       No current facility-administered medications for this visit.     Past Surgical History:   Procedure Laterality Date   • HERNIA REPAIR     • KNEE SURGERY Right    • SD TOTAL KNEE ARTHROPLASTY N/A 2017    Procedure: RIGHT TOTAL KNEE ARTHROPLASTY, LEFT KNEE INJECTED WITH CORTISONE;  Surgeon: Bradford Torres MD;  Location: [facility];  Service: Orthopedics   • TONSILLECTOMY        Social History     Tobacco Use   • Smoking status: Former Smoker     Packs/day: 0.25     Years: 3.00     Pack years: 0.75     Types: Cigarettes     Quit date:      Years since quittin.7   • Smokeless tobacco: Never Used   • Tobacco comment: smoked as a teenager   Vaping Use   • Vaping Use: Never used   Substance Use Topics   • Alcohol use: Yes     Alcohol/week: 3.0 standard drinks     Types: 3 Cans of beer per week     Comment: socially   • Drug use: No     Family History   Problem Relation Age of Onset   • Diabetes Mother    • Hypertension Mother    • Colon polyps Father    • Stroke Father    • Hypertension Sister    • Rheum arthritis Sister    • Malig Hyperthermia Neg Hx      Health Maintenance Due   Topic Date Due   • ZOSTER VACCINE (1 of 2) Never done   • HEPATITIS C SCREENING  Never done      Immunization History   Administered Date(s) Administered   • COVID-19 (JAGDEEP) 2021   • TD Preservative Free 03/10/2017        Objective     Vitals:    21 0915   BP: 122/71   BP Location: Left arm   Patient Position: Sitting   Pulse: 64   Temp: 98.8 °F  "(37.1 °C)   Weight: 130 kg (286 lb 6.4 oz)   Height: 175.3 cm (69.02\")     Body mass index is 42.27 kg/m².     Physical Exam  Vitals reviewed.   Constitutional:       Appearance: Normal appearance. He is obese.   HENT:      Head: Normocephalic.      Mouth/Throat:      Mouth: Mucous membranes are moist.      Pharynx: Oropharynx is clear.   Eyes:      Conjunctiva/sclera: Conjunctivae normal.   Cardiovascular:      Rate and Rhythm: Normal rate and regular rhythm.      Heart sounds: No murmur heard.     Pulmonary:      Effort: Pulmonary effort is normal.      Breath sounds: Normal breath sounds.   Abdominal:      General: Bowel sounds are normal.      Tenderness: There is no abdominal tenderness.   Musculoskeletal:         General: Normal range of motion.      Cervical back: Normal range of motion.   Skin:     General: Skin is warm and dry.      Findings: Lesion (appears to be a cyst to left mid back) and rash present. Rash is papular (around ankle/ left upper lateral calf).          Neurological:      General: No focal deficit present.      Mental Status: He is alert. Mental status is at baseline.   Psychiatric:         Mood and Affect: Mood normal.         Behavior: Behavior normal.         Thought Content: Thought content normal.           Result Review :                               Assessment and Plan      Diagnoses and all orders for this visit:    1. Routine general medical examination at a health care facility (Primary)  Comments:  diet and exercise, recommend annual health screening     2. Testosterone deficiency  Assessment & Plan:  Previously treated with testosterone pellets.  Feeling like he is ok for now.  Prior treatment caused some problems.        3. Sleep apnea with use of continuous positive airway pressure (CPAP)  Assessment & Plan:  Currently using CPAP  Managed by [facility] Dr. Deluca        4. Primary osteoarthritis of right knee  Assessment & Plan:  S/p total right knee replacement.  "       5. Other chronic pain  Assessment & Plan:  Managed by Pain management Dr. Kartik Crews        6. Essential hypertension  Assessment & Plan:  Hypertension is unchanged.  Continue current treatment regimen.  Weight loss.  Regular aerobic exercise.  Continue current medications.  Ambulatory blood pressure monitoring.  Blood pressure will be reassessed at the next regular appointment.    Taking lisinopril daily         7. High cholesterol  Assessment & Plan:  Lipid abnormalities are unchanged.  Nutritional counseling was provided. and Pharmacotherapy as ordered.  Lipids will be reassessed in 6 months.  Taking fenofibrate, trigs elevated       8. Abreu's esophagus with dysplasia, unspecified  Assessment & Plan:  Taking omeprazole daily         9. Recurrent major depressive disorder, in full remission (CMS/HCC)  Assessment & Plan:  Patient's depression is recurrent and is severe without psychosis. Their depression is currently in full remission and the condition is improving with treatment. This will be reassessed at the next regular appointment. F/U as described:patient will continue current medication therapy.      10. Bipolar disorder, in full remission, most recent episode depressed (CMS/HCC)  Assessment & Plan:  Psychological condition is improving with treatment.  Continue current treatment regimen.  Psychological condition  will be reassessed at the next regular appointment.    Taking lamictal 200 daily       11. Rash  -     triamcinolone (KENALOG) 0.1 % cream; Apply  topically to the appropriate area as directed 2 (Two) Times a Day for 10 days.  Dispense: 45 g; Refill: 0    12. Screening for viral disease            Follow Up     Return in about 3 months (around 12/7/2021) for Next scheduled follow up.

## 2021-09-07 NOTE — ASSESSMENT & PLAN NOTE
Patient's depression is recurrent and is severe without psychosis. Their depression is currently in full remission and the condition is improving with treatment. This will be reassessed at the next regular appointment. F/U as described:patient will continue current medication therapy.

## 2021-09-14 ENCOUNTER — OFFICE VISIT (OUTPATIENT)
Dept: FAMILY MEDICINE CLINIC | Age: 56
End: 2021-09-14

## 2021-09-14 VITALS
OXYGEN SATURATION: 97 % | HEIGHT: 70 IN | TEMPERATURE: 98.7 F | WEIGHT: 289.6 LBS | HEART RATE: 71 BPM | DIASTOLIC BLOOD PRESSURE: 73 MMHG | BODY MASS INDEX: 41.46 KG/M2 | SYSTOLIC BLOOD PRESSURE: 147 MMHG

## 2021-09-14 DIAGNOSIS — R52 GENERALIZED BODY ACHES: ICD-10-CM

## 2021-09-14 DIAGNOSIS — J34.2 DEVIATED SEPTUM: ICD-10-CM

## 2021-09-14 DIAGNOSIS — R35.0 URINARY FREQUENCY: Primary | ICD-10-CM

## 2021-09-14 DIAGNOSIS — M25.50 ARTHRALGIA, UNSPECIFIED JOINT: ICD-10-CM

## 2021-09-14 LAB
BILIRUB BLD-MCNC: NEGATIVE MG/DL
CLARITY, POC: CLEAR
COLOR UR: ABNORMAL
EXPIRATION DATE: NORMAL
EXPIRATION DATE: NORMAL
FLUAV AG NPH QL: NEGATIVE
FLUBV AG NPH QL: NEGATIVE
GLUCOSE UR STRIP-MCNC: NEGATIVE MG/DL
INTERNAL CONTROL: NORMAL
INTERNAL CONTROL: NORMAL
KETONES UR QL: NEGATIVE
LEUKOCYTE EST, POC: NEGATIVE
Lab: NORMAL
Lab: NORMAL
NITRITE UR-MCNC: NEGATIVE MG/ML
PH UR: 6.5 [PH] (ref 5–8)
PROT UR STRIP-MCNC: NEGATIVE MG/DL
RBC # UR STRIP: NEGATIVE /UL
SARS-COV-2 AG UPPER RESP QL IA.RAPID: NOT DETECTED
SP GR UR: 1.03 (ref 1–1.03)
UROBILINOGEN UR QL: NORMAL

## 2021-09-14 PROCEDURE — 81003 URINALYSIS AUTO W/O SCOPE: CPT | Performed by: NURSE PRACTITIONER

## 2021-09-14 PROCEDURE — 99214 OFFICE O/P EST MOD 30 MIN: CPT | Performed by: NURSE PRACTITIONER

## 2021-09-14 PROCEDURE — 87426 SARSCOV CORONAVIRUS AG IA: CPT | Performed by: NURSE PRACTITIONER

## 2021-09-14 PROCEDURE — 87804 INFLUENZA ASSAY W/OPTIC: CPT | Performed by: NURSE PRACTITIONER

## 2021-09-14 NOTE — PROGRESS NOTES
"Chief Complaint  Urinary Tract Infection (burns, odor), Generalized Body Aches (joint stiffness onset 9/11/21), and Diarrhea (onset 9/13/21)    Subjective          Anthony Brown presents to [facility] with multiple complaints. He states he has had multiple joint pain/stiffness since the 11th. He has family hx of RA. He has been tested before in the past and was negative. He would like to see a rheumatologist. He also states his urine has had a strong odor. No other urinary complaints. He has had some intermittent diarrhea as well. No known ill contacts. Denies fever, chills, nausea, vomiting, cough, soa or chest pain.           Objective   Vital Signs:   /73   Pulse 71   Temp 98.7 °F (37.1 °C)   Ht 177.8 cm (70\")   Wt 131 kg (289 lb 9.6 oz)   SpO2 97%   BMI 41.55 kg/m²     Physical Exam  Vitals reviewed.   Constitutional:       General: He is not in acute distress.     Appearance: Normal appearance. He is well-developed. He is obese. He is not ill-appearing or toxic-appearing.   HENT:      Head: Normocephalic and atraumatic.      Nose: Congestion present.      Comments: Deviated septum  Eyes:      Conjunctiva/sclera: Conjunctivae normal.      Pupils: Pupils are equal, round, and reactive to light.   Cardiovascular:      Rate and Rhythm: Normal rate and regular rhythm.      Heart sounds: Normal heart sounds. No murmur heard.     Pulmonary:      Effort: Pulmonary effort is normal. No respiratory distress.      Breath sounds: Normal breath sounds. No wheezing, rhonchi or rales.   Abdominal:      General: Bowel sounds are normal. There is no distension.      Palpations: Abdomen is soft. There is no mass.      Tenderness: There is no abdominal tenderness.      Hernia: No hernia is present.   Musculoskeletal:         General: No swelling, tenderness or deformity. Normal range of motion.      Cervical back: Full passive range of motion without pain.      Right lower leg: No " edema.      Left lower leg: No edema.   Skin:     General: Skin is warm and dry.   Neurological:      Mental Status: He is alert and oriented to person, place, and time.   Psychiatric:         Mood and Affect: Mood and affect normal.         Behavior: Behavior normal.         Thought Content: Thought content normal.         Judgment: Judgment normal.          Result Review :              Assessment and Plan    Diagnoses and all orders for this visit:    1. Urinary frequency (Primary)  Comments:  UA negative for UTI. Increase water intake. f/u as needed  Orders:  -     POCT urinalysis dipstick, automated    2. Generalized body aches  Comments:  Continue celebrex. Tylenol as needed.   Orders:  -     POCT Influenza A/B  -     POCT SARS-CoV-2 Antigen SAVANNA    3. Arthralgia, unspecified joint  Comments:  continue celebrex, tylenol as needed. Sending to rheumatolgy as requested  Orders:  -     Ambulatory Referral to Rheumatology    4. Deviated septum  Comments:  sending to ENT. Zrytec and flonase daily  Orders:  -     Ambulatory Referral to ENT (Otolaryngology)        Follow Up    Return for Next scheduled follow up with pcp.  Patient was given instructions and counseling regarding his condition or for health maintenance advice. Please see specific information pulled into the AVS if appropriate.

## 2021-09-20 ENCOUNTER — TRANSCRIBE ORDERS (OUTPATIENT)
Dept: ADMINISTRATIVE | Facility: HOSPITAL | Age: 56
End: 2021-09-20

## 2021-09-20 ENCOUNTER — LAB (OUTPATIENT)
Dept: LAB | Facility: HOSPITAL | Age: 56
End: 2021-09-20

## 2021-09-20 DIAGNOSIS — M25.50 PAIN IN JOINT, MULTIPLE SITES: ICD-10-CM

## 2021-09-20 DIAGNOSIS — M25.50 PAIN IN JOINT, MULTIPLE SITES: Primary | ICD-10-CM

## 2021-09-20 PROCEDURE — 85652 RBC SED RATE AUTOMATED: CPT

## 2021-09-20 PROCEDURE — 36415 COLL VENOUS BLD VENIPUNCTURE: CPT

## 2021-09-21 LAB — ERYTHROCYTE [SEDIMENTATION RATE] IN BLOOD: 8 MM/HR (ref 0–20)

## 2021-10-20 ENCOUNTER — OFFICE VISIT (OUTPATIENT)
Dept: OTOLARYNGOLOGY | Facility: CLINIC | Age: 56
End: 2021-10-20

## 2021-10-20 ENCOUNTER — LAB (OUTPATIENT)
Dept: LAB | Facility: HOSPITAL | Age: 56
End: 2021-10-20

## 2021-10-20 ENCOUNTER — TRANSCRIBE ORDERS (OUTPATIENT)
Dept: ADMINISTRATIVE | Facility: HOSPITAL | Age: 56
End: 2021-10-20

## 2021-10-20 VITALS — WEIGHT: 290.6 LBS | TEMPERATURE: 97.2 F | HEIGHT: 70 IN | BODY MASS INDEX: 41.6 KG/M2

## 2021-10-20 DIAGNOSIS — J30.1 SEASONAL ALLERGIC RHINITIS DUE TO POLLEN: Primary | ICD-10-CM

## 2021-10-20 DIAGNOSIS — J34.89 NASAL OBSTRUCTION: ICD-10-CM

## 2021-10-20 DIAGNOSIS — M25.50 PAIN IN JOINT, MULTIPLE SITES: ICD-10-CM

## 2021-10-20 DIAGNOSIS — M25.50 PAIN IN JOINT, MULTIPLE SITES: Primary | ICD-10-CM

## 2021-10-20 LAB — ERYTHROCYTE [SEDIMENTATION RATE] IN BLOOD: 3 MM/HR (ref 0–20)

## 2021-10-20 PROCEDURE — 99203 OFFICE O/P NEW LOW 30 MIN: CPT | Performed by: OTOLARYNGOLOGY

## 2021-10-20 PROCEDURE — 85652 RBC SED RATE AUTOMATED: CPT

## 2021-10-20 PROCEDURE — 36415 COLL VENOUS BLD VENIPUNCTURE: CPT

## 2021-10-20 RX ORDER — FLUTICASONE PROPIONATE 50 MCG
2 SPRAY, SUSPENSION (ML) NASAL DAILY
Qty: 16 ML | Refills: 3 | Status: SHIPPED | OUTPATIENT
Start: 2021-10-20 | End: 2022-08-04 | Stop reason: SDUPTHER

## 2021-10-20 RX ORDER — CETIRIZINE HYDROCHLORIDE 10 MG/1
10 TABLET ORAL DAILY
COMMUNITY
End: 2022-02-10

## 2021-10-20 RX ORDER — VIT C/B6/B5/MAGNESIUM/HERB 173 50-5-6-5MG
1 CAPSULE ORAL DAILY
COMMUNITY

## 2021-10-20 NOTE — PROGRESS NOTES
Patient Name: Anthony Brown   Visit Date: 10/20/2021   Patient ID: [id number]  Provider: Ha Kulkarni MD    Sex: male  Location[place] [facility]   YOB: 1965  Location Address: [address] [place] [address], [address] [place] [address],?[place]?[id number]    Primary Care Provider Mercedes Armstrong APRN  Location Phone: [phone]    Referring Provider: RAY Barclay        Chief Complaint  Other (deviated nasal septum, nasal congestion )    Subjective    History of Present Illness  Anthony Brown is a 56 y.o. male who presents to [facility] today as a consult from RAY Barclay.    He presents the clinic today for evaluation of nasal obstruction and allergic rhinitis.  He notes that he has difficulty breathing at night, especially through the left nasal passage.  Denies any history of nasal trauma.  Notes that his breathing is normal during the day, but he does have some restriction.  He does have sleep apnea and uses CPAP at night, which is very helpful for him.  He was recently started on fluticasone which he uses daily for the last several weeks.  He has not noted any difference.  He is currently not on any kind of a nasal saline .    He has never had any significant issues with chronic sinusitis, but does note some drainage of white material when he blows his nose.  He denies any facial pain or pressure.  Never has a sense of smell of pus.    Past Medical History:   Diagnosis Date   • Acute URI    • Arthritis     bialteral knees   • Abreu's esophagus with dysplasia, unspecified    • Bipolar disorder (HCC)    • Contact with and (suspected) exposure to other viral communicable diseases    • Depression    • Deviated nasal septum    • GERD (gastroesophageal reflux disease) 2008   • High cholesterol    • History of chest pain     6 YEARS AGO, HAD STRESS TEST NORMAL   • Hypertension    • Low back pain    • Low testosterone level in male     HAS  TESTOSTERONE PELLETS IMPLANTED   • Pain in joint     UNSPECIFIED   • Sciatica    • Sinusitis    • Sleep apnea with use of continuous positive airway pressure (CPAP)    • Sleep apnea, unspecified    • SOB (shortness of breath)    • Testicular hypofunction    • Testosterone deficiency        Past Surgical History:   Procedure Laterality Date   • HERNIA REPAIR     • KNEE SURGERY Right 1985   • CA TOTAL KNEE ARTHROPLASTY N/A 6/26/2017    Procedure: RIGHT TOTAL KNEE ARTHROPLASTY, LEFT KNEE INJECTED WITH CORTISONE;  Surgeon: Bradford Torres MD;  Location: [facility];  Service: Orthopedics   • TONSILLECTOMY  1970         Current Outpatient Medications:   •  celecoxib (CeleBREX) 200 MG capsule, Take 1 capsule by mouth Daily for 90 days., Disp: 90 capsule, Rfl: 3  •  cetirizine (zyrTEC) 10 MG tablet, Take 10 mg by mouth Daily., Disp: , Rfl:   •  Cholecalciferol (VITAMIN D3) 5000 UNITS capsule capsule, Take 2,000 Units by mouth Daily., Disp: , Rfl:   •  Cyanocobalamin (B-12) 5000 MCG sublingual tablet, Place 5,000 mcg under the tongue Daily., Disp: , Rfl:   •  fenofibrate 160 MG tablet, Take 1 tablet by mouth Daily for 90 days., Disp: 90 tablet, Rfl: 3  •  fluticasone (FLONASE) 50 MCG/ACT nasal spray, 2 sprays into the nostril(s) as directed by provider Daily., Disp: , Rfl:   •  lamoTRIgine (LaMICtal) 200 MG tablet, Take 1 tablet by mouth Daily for 90 days., Disp: 90 tablet, Rfl: 3  •  lisinopril (PRINIVIL,ZESTRIL) 20 MG tablet, Take 1 tablet by mouth Daily for 90 days., Disp: 90 tablet, Rfl: 3  •  omeprazole (priLOSEC) 20 MG capsule, Take 1 capsule by mouth Daily for 90 days. Needs appt  before next refill, Disp: 90 capsule, Rfl: 0  •  predniSONE (DELTASONE) 5 MG tablet, Take 2 tablets by mouth Every Morning for 14 days, THEN 1 tablet Every Morning, Disp: 45 tablet, Rfl: 1  •  Probiotic Product (PROBIOTIC PO), Take 1 capsule by mouth Daily. PT TO HOLD MED PRIOR TO OR, Disp: , Rfl:   •  Turmeric (QC Tumeric Complex) 500 MG  "capsule, Take  by mouth., Disp: , Rfl:      No Known Allergies    Family History   Problem Relation Age of Onset   • Diabetes Mother    • Hypertension Mother    • Colon polyps Father    • Stroke Father    • Hypertension Sister    • Rheum arthritis Sister    • Malig Hyperthermia Neg Hx         Social History     Social History Narrative   • Not on file       Objective     Vital Signs:   Temp 97.2 °F (36.2 °C) (Temporal)   Ht 177.8 cm (70\")   Wt 132 kg (290 lb 9.6 oz)   BMI 41.70 kg/m²       Physical Exam         Constitutional   Appearance  · : well developed, well-nourished, alert and in no acute distress, voice clear and strong    Head  Inspection  · : no deformities or lesions  Face  Inspection  · : No facial lesions; House-Brackmann I/VI bilaterally  Palpation  · : No TMJ crepitus nor  muscle tenderness bilaterally    Eyes  Vision  Visual Fields  · : Extraocular movements are intact. No spontaneous or gaze-induced nystagmus.  Conjunctivae  · : clear  Sclerae  · : clear  Pupils and Irises  · : pupils equal, round, and reactive to light.     Ears, Nose, Mouth and Throat    Ears    External Ears  · : appearance within normal limits, no lesions present  Otoscopic Examination  · : Tympanic membrane appearance within normal limits bilaterally without perforations, well-aerated middle ears  Hearing  · : intact to conversational voice both ears  Tunning fork testing:     :    Nose    External Nose  · : appearance normal  Intranasal Exam  · : mucosa within normal limits, vestibules normal, no intranasal lesions present, septum with mild anterior right deviation and left posterior septal spur, hypertrophic inferior turbinates, sinuses non tender to percussion  Oral Cavity    Oral Mucosa  · : oral mucosa normal without pallor or cyanosis  Lips  · : lip appearance normal  Teeth  · : normal dentition for age  Gums  · : gums pink, non-swollen, no bleeding present  Tongue  · : tongue appearance normal; normal " mobility  Palate  · : hard palate normal, soft palate appearance normal with symmetric mobility    Throat    Oropharynx  · : no inflammation or lesions present, tonsils within normal limits  Hypopharynx  · : appearance within normal limits, superior epiglottis within normal limits  Larynx  · : appearance within normal limits, vocal cords within normal limits, no lesions present    Neck  Inspection/Palpation  · : normal appearance, no masses or tenderness, trachea midline; thyroid size normal, nontender, no nodules or masses present on palpation    Respiratory  Respiratory Effort  · : breathing unlabored  Inspection of Chest  · : normal appearance, no retractions    Cardiovascular  Heart  · : regular rate and rhythm    Lymphatic  Neck  · : no lymphadenopathy present  Supraclavicular Nodes  · : no lymphadenopathy present  Preauricular Nodes  · : no lymphadenopathy present    Skin and Subcutaneous Tissue  General Inspection  · : Regarding face and neck - there are no rashes present, no lesions present, and no areas of discoloration    Neurologic  Cranial Nerves  · : cranial nerves II-XII are grossly intact bilaterally  Gait and Station  · : normal gait, able to stand without diffculty    Psychiatric  Judgement and Insight  · : judgment and insight intact  Mood and Affect  · : mood normal, affect appropriate          Assessment and Plan    Diagnoses and all orders for this visit:    1. Seasonal allergic rhinitis due to pollen (Primary)    2. Nasal obstruction    Based on exam I have instructed him to use nasal saline irrigations at least once per day.  I will have him continue to fluticasone nightly after the irrigations.  I discussed allergic rhinitis and his nasal anatomy which does reveal an anterior nasal septal deflection towards the right and posterior left-sided nasal septal spur and hypertrophic inferior turbinates.  If he does not respond well to medical therapy, we will plan on obtaining CT scan of the  sinuses, and should he feel that he continues to have trouble breathing I did discuss septoplasty and inferior turbinate reductions as an option.  I will be glad to see him back in the clinic should there be no improvement despite treatment.    Follow Up   No follow-ups on file.  Patient was given instructions and counseling regarding his condition or for health maintenance advice. Please see specific information pulled into the AVS if appropriate.

## 2021-11-24 DIAGNOSIS — K22.719 BARRETT'S ESOPHAGUS WITH DYSPLASIA, UNSPECIFIED: ICD-10-CM

## 2021-11-24 DIAGNOSIS — M17.11 PRIMARY OSTEOARTHRITIS OF RIGHT KNEE: ICD-10-CM

## 2021-11-29 RX ORDER — CELECOXIB 200 MG/1
200 CAPSULE ORAL DAILY
Qty: 90 CAPSULE | Refills: 3 | Status: SHIPPED | OUTPATIENT
Start: 2021-11-29 | End: 2022-10-17 | Stop reason: SDUPTHER

## 2021-11-29 RX ORDER — OMEPRAZOLE 20 MG/1
20 CAPSULE, DELAYED RELEASE ORAL DAILY
Qty: 90 CAPSULE | Refills: 0 | Status: SHIPPED | OUTPATIENT
Start: 2021-11-29 | End: 2022-02-21 | Stop reason: SDUPTHER

## 2022-01-26 DIAGNOSIS — I10 ESSENTIAL HYPERTENSION: ICD-10-CM

## 2022-01-26 DIAGNOSIS — K22.719 BARRETT'S ESOPHAGUS WITH DYSPLASIA, UNSPECIFIED: ICD-10-CM

## 2022-01-27 RX ORDER — LISINOPRIL 20 MG/1
20 TABLET ORAL DAILY
Qty: 90 TABLET | Refills: 3 | OUTPATIENT
Start: 2022-01-27 | End: 2022-04-27

## 2022-01-27 RX ORDER — OMEPRAZOLE 20 MG/1
20 CAPSULE, DELAYED RELEASE ORAL DAILY
Qty: 90 CAPSULE | Refills: 0 | OUTPATIENT
Start: 2022-01-27 | End: 2022-04-27

## 2022-01-29 ENCOUNTER — TELEMEDICINE (OUTPATIENT)
Dept: FAMILY MEDICINE CLINIC | Facility: TELEHEALTH | Age: 57
End: 2022-01-29

## 2022-01-29 DIAGNOSIS — B97.89 VIRAL RESPIRATORY INFECTION: ICD-10-CM

## 2022-01-29 DIAGNOSIS — J98.8 VIRAL RESPIRATORY INFECTION: ICD-10-CM

## 2022-01-29 DIAGNOSIS — Z20.822 CLOSE EXPOSURE TO COVID-19 VIRUS: Primary | ICD-10-CM

## 2022-01-29 PROCEDURE — 99213 OFFICE O/P EST LOW 20 MIN: CPT | Performed by: NURSE PRACTITIONER

## 2022-01-29 PROCEDURE — U0004 COV-19 TEST NON-CDC HGH THRU: HCPCS | Performed by: NURSE PRACTITIONER

## 2022-01-29 RX ORDER — DEXTROMETHORPHAN HYDROBROMIDE AND PROMETHAZINE HYDROCHLORIDE 15; 6.25 MG/5ML; MG/5ML
5 SYRUP ORAL NIGHTLY PRN
Qty: 118 ML | Refills: 0 | Status: SHIPPED | OUTPATIENT
Start: 2022-01-29 | End: 2022-02-10

## 2022-01-29 RX ORDER — BENZONATATE 200 MG/1
200 CAPSULE ORAL 3 TIMES DAILY PRN
Qty: 20 CAPSULE | Refills: 0 | Status: SHIPPED | OUTPATIENT
Start: 2022-01-29 | End: 2022-02-10

## 2022-01-29 NOTE — PROGRESS NOTES
You have chosen to receive care through a telehealth visit.  Do you consent to use a video/audio connection for your medical care today? Yes     CHIEF COMPLAINT  Chief Complaint   Patient presents with   • Fever   • Cough         HPI  Anthony Brown is a 56 y.o. male  presents with complaint of close exposure to COVID-19 by his wife and now he is having symptoms.     Review of Systems   Constitutional: Positive for fatigue and fever. Negative for chills and diaphoresis.   HENT: Positive for congestion, rhinorrhea, sinus pressure, sneezing and sore throat.    Respiratory: Positive for cough. Negative for chest tightness, shortness of breath and wheezing.    Cardiovascular: Positive for chest pain (with cough ).   Gastrointestinal: Negative for diarrhea, nausea and vomiting.   Musculoskeletal: Positive for myalgias.   Neurological: Positive for headaches.       Past Medical History:   Diagnosis Date   • Acute URI    • Arthritis     bialteral knees   • Abreu's esophagus with dysplasia, unspecified    • Bipolar disorder (HCC)    • Contact with and (suspected) exposure to other viral communicable diseases    • Depression    • Deviated nasal septum    • GERD (gastroesophageal reflux disease) 2008   • High cholesterol    • History of chest pain     6 YEARS AGO, HAD STRESS TEST NORMAL   • Hypertension    • Low back pain    • Low testosterone level in male     HAS TESTOSTERONE PELLETS IMPLANTED   • Pain in joint     UNSPECIFIED   • Sciatica    • Sinusitis    • Sleep apnea with use of continuous positive airway pressure (CPAP)    • Sleep apnea, unspecified    • SOB (shortness of breath)    • Testicular hypofunction    • Testosterone deficiency        Family History   Problem Relation Age of Onset   • Diabetes Mother    • Hypertension Mother    • Colon polyps Father    • Stroke Father    • Hypertension Sister    • Rheum arthritis Sister    • Malig Hyperthermia Neg Hx        Social History     Socioeconomic History   •  Marital status:    • Number of children: 2   Tobacco Use   • Smoking status: Former Smoker     Packs/day: 0.25     Years: 3.00     Pack years: 0.75     Types: Cigarettes, Pipe, Cigars     Quit date:      Years since quittin.1   • Smokeless tobacco: Never Used   • Tobacco comment: smoked as a teenager   Vaping Use   • Vaping Use: Never used   Substance and Sexual Activity   • Alcohol use: Not Currently     Alcohol/week: 3.0 standard drinks     Types: 3 Cans of beer per week     Comment: socially   • Drug use: No   • Sexual activity: Defer         There were no vitals taken for this visit.    PHYSICAL EXAM  Physical Exam   Constitutional: He is oriented to person, place, and time. He does not have a sickly appearance. He does not appear ill. No distress. He appears overweight.  HENT:   Head: Normocephalic and atraumatic.   Eyes: EOM are normal.   Neck: Neck normal appearance.  Pulmonary/Chest: Effort normal.  No respiratory distress.  Neurological: He is alert and oriented to person, place, and time.   Skin: Skin is dry.   Psychiatric: He has a normal mood and affect.         Diagnoses and all orders for this visit:    1. Close exposure to COVID-19 virus (Primary)  -     COVID-19,APTIMA PANTHER(LAVELLE),[facility]NAL, NP/OP SWAB IN UTM/VTM/SALINE TRANSPORT MEDIA,24 HR TAT - Swab, Nasopharynx; Future  -     QUESTIONNAIRE SERIES    2. Viral respiratory infection  -     COVID-19,APTIMA PANTHER(LAVELLE),[facility]NAL, NP/OP SWAB IN UTM/VTM/SALINE TRANSPORT MEDIA,24 HR TAT - Swab, Nasopharynx; Future  -     QUESTIONNAIRE SERIES    Other orders  -     promethazine-dextromethorphan (PROMETHAZINE-DM) 6.25-15 MG/5ML syrup; Take 5 mL by mouth At Night As Needed for Cough.  Dispense: 118 mL; Refill: 0  -     benzonatate (TESSALON) 200 MG capsule; Take 1 capsule by mouth 3 (Three) Times a Day As Needed for Cough.  Dispense: 20 capsule; Refill: 0          FOLLOW-UP  As discussed during visit with PCP/[facility] if no  improvement or Urgent Care/Emergency Department if worsening of symptoms    Patient verbalizes understanding of medication dosage, comfort measures, instructions for treatment and follow-up.    Mia Willis, RAY  01/29/2022  10:48 EST    This visit was performed via Telehealth.  This patient has been instructed to follow-up with their primary care provider if their symptoms worsen or the treatment provided does not resolve their illness.

## 2022-01-29 NOTE — PATIENT INSTRUCTIONS
I have included a COVID-19 test. Go to your nearest [facility] for testing. Tell them you have already been seen virtually and only need testing   We will notify you of your COVID-19 results by phone. You will receive a call from an unknown or blocked number. You can also get your results on your [employer] antonia.    You will need to remain quarantined for 10 days from onset of symptoms and only to discontinue if symptoms have improved and no fever for 24 hours without the use of fever reducing medications such as Tylenol (acetaminophen), Advil (ibuprfen), or Aleve (naproxen). You may discontinue after 5 days if your symptoms have resolved and you have no fever for 24 hours without the use of fever reducing medications such as Tylenol (acetaminophen), Advil (ibuprfen), or Aleve (naproxen).   Continue to wear a mask for 10 days or until symptoms resolve. If symptoms worsen, go to Urgent Care or the Emergency Department for care.     Symptoms of Coronavirus are treated just as you would for any viral illness. Take Tylenol and/or Ibuprofen for pain and/or fever, you may find it better to alternate these every 4 hours, so that you are only taking each every 8 hours. Stay hydrated, rest and you may treat cough with over-the-counter cough syrup such as Robitussin.  If your symptoms do not improve, symptoms change or do not fully resolve, seek further evaluation with your primary care provider or Urgent Care.     If you develop severe symptoms, such as chest pain, high fever, difficulty breathing, difficulty urinating, confusion, difficulty staying awake, blue or pale lips or have any symptoms that interfere with your ability to function go to the nearest Emergency Department immediately.     Upper Respiratory Infection, Adult  An upper respiratory infection (URI) is a common viral infection of the nose, throat, and upper air passages that lead to the lungs. The most common type of URI is the common cold. URIs  usually get better on their own, without medical treatment.  What are the causes?  A URI is caused by a virus. You may catch a virus by:  · Breathing in droplets from an infected person's cough or sneeze.  · Touching something that has been exposed to the virus (contaminated) and then touching your mouth, nose, or eyes.  What increases the risk?  You are more likely to get a URI if:  · You are very young or very old.  · It is pankaj or winter.  · You have close contact with others, such as at a , school, or health care facility.  · You smoke.  · You have long-term (chronic) heart or lung disease.  · You have a weakened disease-fighting (immune) system.  · You have nasal allergies or asthma.  · You are experiencing a lot of stress.  · You work in an area that has poor air circulation.  · You have poor nutrition.  What are the signs or symptoms?  A URI usually involves some of the following symptoms:  · Runny or stuffy (congested) nose.  · Sneezing.  · Cough.  · Sore throat.  · Headache.  · Fatigue.  · Fever.  · Loss of appetite.  · Pain in your forehead, behind your eyes, and over your cheekbones (sinus pain).  · Muscle aches.  · Redness or irritation of the eyes.  · Pressure in the ears or face.  How is this diagnosed?  This condition may be diagnosed based on your medical history and symptoms, and a physical exam. Your health care provider may use a cotton swab to take a mucus sample from your nose (nasal swab). This sample can be tested to determine what virus is causing the illness.  How is this treated?  URIs usually get better on their own within 7-10 days. You can take steps at home to relieve your symptoms. Medicines cannot cure URIs, but your health care provider may recommend certain medicines to help relieve symptoms, such as:  · Over-the-counter cold medicines.  · Cough suppressants. Coughing is a type of defense against infection that helps to clear the respiratory system, so take these medicines  only as recommended by your health care provider.  · Fever-reducing medicines.  Follow these instructions at home:  Activity  · Rest as needed.  · If you have a fever, stay home from work or school until your fever is gone or until your health care provider says you are no longer contagious. Your health care provider may have you wear a face mask to prevent your infection from spreading.  Relieving symptoms  · Gargle with a salt-water mixture 3-4 times a day or as needed. To make a salt-water mixture, completely dissolve ½-1 tsp of salt in 1 cup of warm water.  · Use a cool-mist humidifier to add moisture to the air. This can help you breathe more easily.  Eating and drinking    · Drink enough fluid to keep your urine pale yellow.  · Eat soups and other clear broths.    General instructions    · Take over-the-counter and prescription medicines only as told by your health care provider. These include cold medicines, fever reducers, and cough suppressants.  · Do not use any products that contain nicotine or tobacco, such as cigarettes and e-cigarettes. If you need help quitting, ask your health care provider.  · Stay away from secondhand smoke.  · Stay up to date on all immunizations, including the yearly (annual) flu vaccine.  · Keep all follow-up visits as told by your health care provider. This is important.    How to prevent the spread of infection to others    · URIs can be passed from person to person (are contagious). To prevent the infection from spreading:  ? Wash your hands often with soap and water. If soap and water are not available, use hand .  ? Avoid touching your mouth, face, eyes, or nose.  ? Cough or sneeze into a tissue or your sleeve or elbow instead of into your hand or into the air.    Contact a health care provider if:  · You are getting worse instead of better.  · You have a fever or chills.  · Your mucus is brown or red.  · You have yellow or brown discharge coming from your  nose.  · You have pain in your face, especially when you bend forward.  · You have swollen neck glands.  · You have pain while swallowing.  · You have white areas in the back of your throat.  Get help right away if:  · You have shortness of breath that gets worse.  · You have severe or persistent:  ? Headache.  ? Ear pain.  ? Sinus pain.  ? Chest pain.  · You have chronic lung disease along with any of the following:  ? Wheezing.  ? Prolonged cough.  ? Coughing up blood.  ? A change in your usual mucus.  · You have a stiff neck.  · You have changes in your:  ? Vision.  ? Hearing.  ? Thinking.  ? Mood.  Summary  · An upper respiratory infection (URI) is a common infection of the nose, throat, and upper air passages that lead to the lungs.  · A URI is caused by a virus.  · URIs usually get better on their own within 7-10 days.  · Medicines cannot cure URIs, but your health care provider may recommend certain medicines to help relieve symptoms.  This information is not intended to replace advice given to you by your health care provider. Make sure you discuss any questions you have with your health care provider.  Document Revised: 12/26/2019 Document Reviewed: 08/03/2018  Elsevier Patient Education © 2021 [employer]

## 2022-02-10 ENCOUNTER — TELEMEDICINE (OUTPATIENT)
Dept: FAMILY MEDICINE CLINIC | Facility: TELEHEALTH | Age: 57
End: 2022-02-10

## 2022-02-10 DIAGNOSIS — J01.90 ACUTE NON-RECURRENT SINUSITIS, UNSPECIFIED LOCATION: Primary | ICD-10-CM

## 2022-02-10 DIAGNOSIS — J06.9 UPPER RESPIRATORY TRACT INFECTION DUE TO COVID-19 VIRUS: ICD-10-CM

## 2022-02-10 DIAGNOSIS — U07.1 UPPER RESPIRATORY TRACT INFECTION DUE TO COVID-19 VIRUS: ICD-10-CM

## 2022-02-10 PROCEDURE — 99213 OFFICE O/P EST LOW 20 MIN: CPT | Performed by: NURSE PRACTITIONER

## 2022-02-10 RX ORDER — AMOXICILLIN AND CLAVULANATE POTASSIUM 875; 125 MG/1; MG/1
1 TABLET, FILM COATED ORAL 2 TIMES DAILY
Qty: 14 TABLET | Refills: 0 | Status: SHIPPED | OUTPATIENT
Start: 2022-02-10 | End: 2022-02-17

## 2022-02-10 NOTE — PROGRESS NOTES
Subjective   Chief Complaint   Patient presents with   • URI       Anthony Brown is a 56 y.o. male.     Pt tested positive for COVID 12 days ago. He began having symptoms about 2 weeks ago, symptoms significantly improved and he returned to work this week, however he woke up yesterday with exhaustion, body aches, increased nasal congestion, sinus pressure and cough returned. Cough is deep and mostly non-productive. Denies fever, SOA, wheezing, chest pain.     Sinusitis  This is a new problem. Episode onset: 2 weeks. The problem has been waxing and waning since onset. There has been no fever. Associated symptoms include chills, congestion, coughing, diaphoresis, headaches and sinus pressure. Pertinent negatives include no hoarse voice, shortness of breath, sneezing or sore throat. Treatments tried: tessalon pills.        No Known Allergies    Past Medical History:   Diagnosis Date   • Acute URI    • Arthritis     bialteral knees   • Abreu's esophagus with dysplasia, unspecified    • Bipolar disorder (HCC)    • Contact with and (suspected) exposure to other viral communicable diseases    • Depression    • Deviated nasal septum    • GERD (gastroesophageal reflux disease) 2008   • High cholesterol    • History of chest pain     6 YEARS AGO, HAD STRESS TEST NORMAL   • Hypertension    • Low back pain    • Low testosterone level in male     HAS TESTOSTERONE PELLETS IMPLANTED   • Pain in joint     UNSPECIFIED   • Sciatica    • Sinusitis    • Sleep apnea with use of continuous positive airway pressure (CPAP)    • Sleep apnea, unspecified    • SOB (shortness of breath)    • Testicular hypofunction    • Testosterone deficiency        Past Surgical History:   Procedure Laterality Date   • HERNIA REPAIR     • KNEE SURGERY Right 1985   • MS TOTAL KNEE ARTHROPLASTY N/A 6/26/2017    Procedure: RIGHT TOTAL KNEE ARTHROPLASTY, LEFT KNEE INJECTED WITH CORTISONE;  Surgeon: Bradford Torres MD;  Location: [facility] OR;  Service:  Orthopedics   • TONSILLECTOMY         Social History     Socioeconomic History   • Marital status:    • Number of children: 2   Tobacco Use   • Smoking status: Former Smoker     Packs/day: 0.25     Years: 3.00     Pack years: 0.75     Types: Cigarettes, Pipe, Cigars     Quit date:      Years since quittin.1   • Smokeless tobacco: Never Used   • Tobacco comment: smoked as a teenager   Vaping Use   • Vaping Use: Never used   Substance and Sexual Activity   • Alcohol use: Not Currently     Alcohol/week: 3.0 standard drinks     Types: 3 Cans of beer per week     Comment: socially   • Drug use: No   • Sexual activity: Defer       Family History   Problem Relation Age of Onset   • Diabetes Mother    • Hypertension Mother    • Colon polyps Father    • Stroke Father    • Hypertension Sister    • Rheum arthritis Sister    • Malig Hyperthermia Neg Hx          Current Outpatient Medications:   •  amoxicillin-clavulanate (Augmentin) 875-125 MG per tablet, Take 1 tablet by mouth 2 (Two) Times a Day for 7 days., Disp: 14 tablet, Rfl: 0  •  celecoxib (CeleBREX) 200 MG capsule, Take 1 capsule by mouth Daily, Disp: 90 capsule, Rfl: 3  •  Cholecalciferol (VITAMIN D3) 5000 UNITS capsule capsule, Take 2,000 Units by mouth Daily., Disp: , Rfl:   •  Cyanocobalamin (B-12) 5000 MCG sublingual tablet, Place 5,000 mcg under the tongue Daily., Disp: , Rfl:   •  fenofibrate 160 MG tablet, Take 1 tablet by mouth Daily for triglycerides, Disp: 90 tablet, Rfl: 2  •  fluticasone (FLONASE) 50 MCG/ACT nasal spray, Use 2 sprays into the nostril(s) as directed by provider Daily., Disp: 16 mL, Rfl: 3  •  lamoTRIgine (LaMICtal) 200 MG tablet, Take 1 tablet by mouth Daily, Disp: 90 tablet, Rfl: 3  •  lisinopril (PRINIVIL,ZESTRIL) 20 MG tablet, Take 1 tablet by mouth daily, Disp: 90 tablet, Rfl: 2  •  omeprazole (priLOSEC) 20 MG capsule, Take 1 capsule by mouth Daily.  Needs appointment before next refill., Disp: 90 capsule, Rfl: 0  •   Probiotic Product (PROBIOTIC PO), Take 1 capsule by mouth Daily. PT TO HOLD MED PRIOR TO OR, Disp: , Rfl:   •  Turmeric (QC Tumeric Complex) 500 MG capsule, Take  by mouth., Disp: , Rfl:       Review of Systems   Constitutional: Positive for chills, diaphoresis and fatigue. Negative for fever.   HENT: Positive for congestion and sinus pressure. Negative for hoarse voice, sneezing and sore throat.    Respiratory: Positive for cough. Negative for chest tightness, shortness of breath and wheezing.    Gastrointestinal: Negative.    Musculoskeletal: Positive for myalgias.   Neurological: Negative for headache.        There were no vitals filed for this visit.    Objective   Physical Exam  Constitutional:       General: He is not in acute distress.     Appearance: Normal appearance. He is not ill-appearing, toxic-appearing or diaphoretic.   HENT:      Head: Normocephalic.      Nose: Congestion present.      Right Sinus: Maxillary sinus tenderness and frontal sinus tenderness present.      Left Sinus: Maxillary sinus tenderness and frontal sinus tenderness present.      Comments: Per pt       Mouth/Throat:      Lips: Pink.      Mouth: Mucous membranes are moist.   Pulmonary:      Effort: Pulmonary effort is normal.   Neurological:      Mental Status: He is alert and oriented to person, place, and time.   Psychiatric:         Mood and Affect: Mood normal.         Behavior: Behavior normal.          Procedures     Assessment/Plan   Diagnoses and all orders for this visit:    1. Acute non-recurrent sinusitis, unspecified location (Primary)  -     amoxicillin-clavulanate (Augmentin) 875-125 MG per tablet; Take 1 tablet by mouth 2 (Two) Times a Day for 7 days.  Dispense: 14 tablet; Refill: 0    2. Upper respiratory tract infection due to COVID-19 virus  -     amoxicillin-clavulanate (Augmentin) 875-125 MG per tablet; Take 1 tablet by mouth 2 (Two) Times a Day for 7 days.  Dispense: 14 tablet; Refill: 0    You may also try plain  Mucinex for chest congestion.   Flonase for nasal congestion.   Alternate tylenol and motrin for pain and/or fever, stay hydrated and rest.   Warning signs for COVID-19: trouble breathing, increasing shortness of breath, persistent chest pain or pressure, new confusion, inability to stay awake, pale, gray or blue-colored skin, lips or nail beds. If you show any of these signs seek Emergency Care.   If symptoms worsen or do not improve follow up with your PCP or visit your nearest Urgent Care Center or ER.        Results for orders placed or performed during the hospital encounter of 01/29/22   COVID-19,APTIMA PANTHER(LAVELLE),[facility]/BH JUVENAL, NP/OP SWAB IN UTM/VTM/SALINE TRANSPORT MEDIA,24 HR TAT - Swab, Nasopharynx    Specimen: Nasopharynx; Swab   Result Value Ref Range    COVID19 Detected (C) Not Detected - Ref. Range       PLAN: Discussed dosing, side effects, recommended other symptomatic care.  Patient should follow up with primary care provider if symptoms worsen, fail to resolve or other symptoms need attention. Patient/family agree to the above.         RAY Betancur     This visit was performed via Telehealth.  This patient has been instructed to follow-up with their primary care provider if their symptoms worsen or the treatment provided does not resolve their illness.

## 2022-02-10 NOTE — PATIENT INSTRUCTIONS
You may also try plain Mucinex for chest congestion.   Flonase for nasal congestion.   Alternate tylenol and motrin for pain and/or fever, stay hydrated and rest.   Warning signs for COVID-19: trouble breathing, increasing shortness of breath, persistent chest pain or pressure, new confusion, inability to stay awake, pale, gray or blue-colored skin, lips or nail beds. If you show any of these signs seek Emergency Care.   If symptoms worsen or do not improve follow up with your PCP or visit your nearest Urgent Care Center or ER.            Sinusitis, Adult  Sinusitis is soreness and swelling (inflammation) of your sinuses. Sinuses are hollow spaces in the bones around your face. They are located:  · Around your eyes.  · In the middle of your forehead.  · Behind your nose.  · In your cheekbones.  Your sinuses and nasal passages are lined with a fluid called mucus. Mucus drains out of your sinuses. Swelling can trap mucus in your sinuses. This lets germs (bacteria, virus, or fungus) grow, which leads to infection. Most of the time, this condition is caused by a virus.  What are the causes?  This condition is caused by:  · Allergies.  · Asthma.  · Germs.  · Things that block your nose or sinuses.  · Growths in the nose (nasal polyps).  · Chemicals or irritants in the air.  · Fungus (rare).  What increases the risk?  You are more likely to develop this condition if:  · You have a weak body defense system (immune system).  · You do a lot of swimming or diving.  · You use nasal sprays too much.  · You smoke.  What are the signs or symptoms?  The main symptoms of this condition are pain and a feeling of pressure around the sinuses. Other symptoms include:  · Stuffy nose (congestion).  · Runny nose (drainage).  · Swelling and warmth in the sinuses.  · Headache.  · Toothache.  · A cough that may get worse at night.  · Mucus that collects in the throat or the back of the nose (postnasal drip).  · Being unable to smell and  taste.  · Being very tired (fatigue).  · A fever.  · Sore throat.  · Bad breath.  How is this diagnosed?  This condition is diagnosed based on:  · Your symptoms.  · Your medical history.  · A physical exam.  · Tests to find out if your condition is short-term (acute) or long-term (chronic). Your doctor may:  ? Check your nose for growths (polyps).  ? Check your sinuses using a tool that has a light (endoscope).  ? Check for allergies or germs.  ? Do imaging tests, such as an MRI or CT scan.  How is this treated?  Treatment for this condition depends on the cause and whether it is short-term or long-term.  · If caused by a virus, your symptoms should go away on their own within 10 days. You may be given medicines to relieve symptoms. They include:  ? Medicines that shrink swollen tissue in the nose.  ? Medicines that treat allergies (antihistamines).  ? A spray that treats swelling of the nostrils.   ? Rinses that help get rid of thick mucus in your nose (nasal saline washes).  · If caused by bacteria, your doctor may wait to see if you will get better without treatment. You may be given antibiotic medicine if you have:  ? A very bad infection.  ? A weak body defense system.  · If caused by growths in the nose, you may need to have surgery.  Follow these instructions at home:  Medicines  · Take, use, or apply over-the-counter and prescription medicines only as told by your doctor. These may include nasal sprays.  · If you were prescribed an antibiotic medicine, take it as told by your doctor. Do not stop taking the antibiotic even if you start to feel better.  Hydrate and humidify    · Drink enough water to keep your pee (urine) pale yellow.  · Use a cool mist humidifier to keep the humidity level in your home above 50%.  · Breathe in steam for 10-15 minutes, 3-4 times a day, or as told by your doctor. You can do this in the bathroom while a hot shower is running.  · Try not to spend time in cool or dry  "air.    Rest  · Rest as much as you can.  · Sleep with your head raised (elevated).  · Make sure you get enough sleep each night.  General instructions    · Put a warm, moist washcloth on your face 3-4 times a day, or as often as told by your doctor. This will help with discomfort.  · Wash your hands often with soap and water. If there is no soap and water, use hand .  · Do not smoke. Avoid being around people who are smoking (secondhand smoke).  · Keep all follow-up visits as told by your doctor. This is important.    Contact a doctor if:  · You have a fever.  · Your symptoms get worse.  · Your symptoms do not get better within 10 days.  Get help right away if:  · You have a very bad headache.  · You cannot stop throwing up (vomiting).  · You have very bad pain or swelling around your face or eyes.  · You have trouble seeing.  · You feel confused.  · Your neck is stiff.  · You have trouble breathing.  Summary  · Sinusitis is swelling of your sinuses. Sinuses are hollow spaces in the bones around your face.  · This condition is caused by tissues in your nose that become inflamed or swollen. This traps germs. These can lead to infection.  · If you were prescribed an antibiotic medicine, take it as told by your doctor. Do not stop taking it even if you start to feel better.  · Keep all follow-up visits as told by your doctor. This is important.  This information is not intended to replace advice given to you by your health care provider. Make sure you discuss any questions you have with your health care provider.  Document Revised: 05/20/2019 Document Reviewed: 05/20/2019  Elsevier Patient Education © 2021 [employer]      Upper Respiratory Infection, Adult  An upper respiratory infection (URI) affects the nose, throat, and upper air passages. URIs are caused by germs (viruses). The most common type of URI is often called \"the common cold.\"  Medicines cannot cure URIs, but you can do things at home to " relieve your symptoms. URIs usually get better within 7-10 days.  Follow these instructions at home:  Activity  · Rest as needed.  · If you have a fever, stay home from work or school until your fever is gone, or until your doctor says you may return to work or school.  ? You should stay home until you cannot spread the infection anymore (you are not contagious).  ? Your doctor may have you wear a face mask so you have less risk of spreading the infection.  Relieving symptoms  · Gargle with a salt-water mixture 3-4 times a day or as needed. To make a salt-water mixture, completely dissolve ½-1 tsp of salt in 1 cup of warm water.  · Use a cool-mist humidifier to add moisture to the air. This can help you breathe more easily.  Eating and drinking    · Drink enough fluid to keep your pee (urine) pale yellow.  · Eat soups and other clear broths.    General instructions    · Take over-the-counter and prescription medicines only as told by your doctor. These include cold medicines, fever reducers, and cough suppressants.  · Do not use any products that contain nicotine or tobacco. These include cigarettes and e-cigarettes. If you need help quitting, ask your doctor.  · Avoid being where people are smoking (avoid secondhand smoke).  · Make sure you get regular shots and get the flu shot every year.  · Keep all follow-up visits as told by your doctor. This is important.    How to avoid spreading infection to others    · Wash your hands often with soap and water. If you do not have soap and water, use hand .  · Avoid touching your mouth, face, eyes, or nose.  · Cough or sneeze into a tissue or your sleeve or elbow. Do not cough or sneeze into your hand or into the air.    Contact a doctor if:  · You are getting worse, not better.  · You have any of these:  ? A fever.  ? Chills.  ? Brown or red mucus in your nose.  ? Yellow or brown fluid (discharge)coming from your nose.  ? Pain in your face, especially when you  "bend forward.  ? Swollen neck glands.  ? Pain with swallowing.  ? White areas in the back of your throat.  Get help right away if:  · You have shortness of breath that gets worse.  · You have very bad or constant:  ? Headache.  ? Ear pain.  ? Pain in your forehead, behind your eyes, and over your cheekbones (sinus pain).  ? Chest pain.  · You have long-lasting (chronic) lung disease along with any of these:  ? Wheezing.  ? Long-lasting cough.  ? Coughing up blood.  ? A change in your usual mucus.  · You have a stiff neck.  · You have changes in your:  ? Vision.  ? Hearing.  ? Thinking.  ? Mood.  Summary  · An upper respiratory infection (URI) is caused by a germ called a virus. The most common type of URI is often called \"the common cold.\"  · URIs usually get better within 7-10 days.  · Take over-the-counter and prescription medicines only as told by your doctor.  This information is not intended to replace advice given to you by your health care provider. Make sure you discuss any questions you have with your health care provider.  Document Revised: 12/26/2019 Document Reviewed: 08/10/2018  Elsevier Patient Education © 2021 [employer] Inc.      "

## 2022-02-21 DIAGNOSIS — K22.719 BARRETT'S ESOPHAGUS WITH DYSPLASIA, UNSPECIFIED: ICD-10-CM

## 2022-02-22 RX ORDER — OMEPRAZOLE 20 MG/1
20 CAPSULE, DELAYED RELEASE ORAL DAILY
Qty: 90 CAPSULE | Refills: 0 | Status: SHIPPED | OUTPATIENT
Start: 2022-02-22 | End: 2022-04-04 | Stop reason: SDUPTHER

## 2022-03-02 ENCOUNTER — OFFICE VISIT (OUTPATIENT)
Dept: FAMILY MEDICINE CLINIC | Age: 57
End: 2022-03-02

## 2022-03-02 ENCOUNTER — LAB (OUTPATIENT)
Dept: LAB | Facility: HOSPITAL | Age: 57
End: 2022-03-02

## 2022-03-02 VITALS
HEART RATE: 75 BPM | WEIGHT: 293.2 LBS | BODY MASS INDEX: 41.97 KG/M2 | OXYGEN SATURATION: 98 % | TEMPERATURE: 98.1 F | DIASTOLIC BLOOD PRESSURE: 73 MMHG | SYSTOLIC BLOOD PRESSURE: 130 MMHG | HEIGHT: 70 IN

## 2022-03-02 DIAGNOSIS — R19.7 DIARRHEA, UNSPECIFIED TYPE: Primary | ICD-10-CM

## 2022-03-02 DIAGNOSIS — R19.7 DIARRHEA, UNSPECIFIED TYPE: ICD-10-CM

## 2022-03-02 DIAGNOSIS — Z86.16 HISTORY OF COVID-19: ICD-10-CM

## 2022-03-02 DIAGNOSIS — E66.01 CLASS 3 SEVERE OBESITY DUE TO EXCESS CALORIES WITHOUT SERIOUS COMORBIDITY WITH BODY MASS INDEX (BMI) OF 40.0 TO 44.9 IN ADULT: ICD-10-CM

## 2022-03-02 PROBLEM — E66.813 CLASS 3 SEVERE OBESITY DUE TO EXCESS CALORIES WITHOUT SERIOUS COMORBIDITY WITH BODY MASS INDEX (BMI) OF 40.0 TO 44.9 IN ADULT: Status: ACTIVE | Noted: 2022-03-02

## 2022-03-02 LAB
027 TOXIN: NORMAL
ALBUMIN SERPL-MCNC: 4.3 G/DL (ref 3.5–5.2)
ALBUMIN/GLOB SERPL: 1.4 G/DL
ALP SERPL-CCNC: 87 U/L (ref 39–117)
ALT SERPL W P-5'-P-CCNC: 54 U/L (ref 1–41)
ANION GAP SERPL CALCULATED.3IONS-SCNC: 14.1 MMOL/L (ref 5–15)
AST SERPL-CCNC: 27 U/L (ref 1–40)
BASOPHILS # BLD AUTO: 0.03 10*3/MM3 (ref 0–0.2)
BASOPHILS NFR BLD AUTO: 0.5 % (ref 0–1.5)
BILIRUB SERPL-MCNC: 0.3 MG/DL (ref 0–1.2)
BUN SERPL-MCNC: 14 MG/DL (ref 6–20)
BUN/CREAT SERPL: 13.9 (ref 7–25)
C DIFF TOX GENS STL QL NAA+PROBE: NEGATIVE
CALCIUM SPEC-SCNC: 10 MG/DL (ref 8.6–10.5)
CHLORIDE SERPL-SCNC: 106 MMOL/L (ref 98–107)
CO2 SERPL-SCNC: 20.9 MMOL/L (ref 22–29)
CREAT SERPL-MCNC: 1.01 MG/DL (ref 0.76–1.27)
DEPRECATED RDW RBC AUTO: 42.1 FL (ref 37–54)
EGFRCR SERPLBLD CKD-EPI 2021: 87.8 ML/MIN/1.73
EOSINOPHIL # BLD AUTO: 0.69 10*3/MM3 (ref 0–0.4)
EOSINOPHIL NFR BLD AUTO: 10.7 % (ref 0.3–6.2)
ERYTHROCYTE [DISTWIDTH] IN BLOOD BY AUTOMATED COUNT: 13.1 % (ref 12.3–15.4)
GLOBULIN UR ELPH-MCNC: 3 GM/DL
GLUCOSE SERPL-MCNC: 120 MG/DL (ref 65–99)
HCT VFR BLD AUTO: 46.2 % (ref 37.5–51)
HGB BLD-MCNC: 15.9 G/DL (ref 13–17.7)
IMM GRANULOCYTES # BLD AUTO: 0.06 10*3/MM3 (ref 0–0.05)
IMM GRANULOCYTES NFR BLD AUTO: 0.9 % (ref 0–0.5)
LYMPHOCYTES # BLD AUTO: 1.84 10*3/MM3 (ref 0.7–3.1)
LYMPHOCYTES NFR BLD AUTO: 28.6 % (ref 19.6–45.3)
MCH RBC QN AUTO: 29.9 PG (ref 26.6–33)
MCHC RBC AUTO-ENTMCNC: 34.4 G/DL (ref 31.5–35.7)
MCV RBC AUTO: 86.8 FL (ref 79–97)
MONOCYTES # BLD AUTO: 0.52 10*3/MM3 (ref 0.1–0.9)
MONOCYTES NFR BLD AUTO: 8.1 % (ref 5–12)
NEUTROPHILS NFR BLD AUTO: 3.3 10*3/MM3 (ref 1.7–7)
NEUTROPHILS NFR BLD AUTO: 51.2 % (ref 42.7–76)
PLATELET # BLD AUTO: 270 10*3/MM3 (ref 140–450)
PMV BLD AUTO: 9.5 FL (ref 6–12)
POTASSIUM SERPL-SCNC: 4.3 MMOL/L (ref 3.5–5.2)
PROT SERPL-MCNC: 7.3 G/DL (ref 6–8.5)
RBC # BLD AUTO: 5.32 10*6/MM3 (ref 4.14–5.8)
SODIUM SERPL-SCNC: 141 MMOL/L (ref 136–145)
WBC NRBC COR # BLD: 6.44 10*3/MM3 (ref 3.4–10.8)

## 2022-03-02 PROCEDURE — 85025 COMPLETE CBC W/AUTO DIFF WBC: CPT

## 2022-03-02 PROCEDURE — 99213 OFFICE O/P EST LOW 20 MIN: CPT | Performed by: NURSE PRACTITIONER

## 2022-03-02 PROCEDURE — 87493 C DIFF AMPLIFIED PROBE: CPT

## 2022-03-02 PROCEDURE — 87505 NFCT AGENT DETECTION GI: CPT

## 2022-03-02 PROCEDURE — 80053 COMPREHEN METABOLIC PANEL: CPT

## 2022-03-02 PROCEDURE — 36415 COLL VENOUS BLD VENIPUNCTURE: CPT

## 2022-03-02 RX ORDER — MULTIPLE VITAMINS W/ MINERALS TAB 9MG-400MCG
1 TAB ORAL DAILY
COMMUNITY

## 2022-03-02 RX ORDER — LANOLIN ALCOHOL/MO/W.PET/CERES
5000 CREAM (GRAM) TOPICAL DAILY
COMMUNITY

## 2022-03-02 NOTE — PROGRESS NOTES
Answers for HPI/ROS submitted by the patient on 2/24/2022  What is the primary reason for your visit?: Other  Please describe your symptoms.: Diarrhea more on than off for over a month.  Have you had these symptoms before?: Yes  How long have you been having these symptoms?: Greater than 2 weeks    Chief Complaint  Anthony Brown presents to [facility] for GI Problem (Since Covid last last week of January 2021, diarrhea ongoing and persistant.  Lactose intolerant)    Subjective          Previous history of Covid end of January/first of February - since that time, has had ongoing bowel issues.  He is having severe diarrhea/with occasional vomiting Denies any blood in stool.  Denies severe abdominal pain but just discomfort.  Reports that he previously has BMs 2 times per day but during the spells, he will go 15-20 times   Will be watery during spells He does report that he will have some 'lactose intolerance'  And notices that this is worse after eating dairy product, but is not eating differently than previous.          Review of Systems      No Known Allergies   Past Medical History:   Diagnosis Date   • Acute URI    • Arthritis     bialteral knees   • Abreu's esophagus with dysplasia, unspecified    • Bipolar disorder (HCC)    • Contact with and (suspected) exposure to other viral communicable diseases    • Depression    • Deviated nasal septum    • GERD (gastroesophageal reflux disease) 2008   • High cholesterol    • History of chest pain     6 YEARS AGO, HAD STRESS TEST NORMAL   • Hypertension    • Low back pain    • Low testosterone level in male     HAS TESTOSTERONE PELLETS IMPLANTED   • Obesity    • Pain in joint     UNSPECIFIED   • Sciatica    • Sinusitis    • Sleep apnea with use of continuous positive airway pressure (CPAP)    • Sleep apnea, unspecified    • SOB (shortness of breath)    • Testicular hypofunction    • Testosterone deficiency      Current Outpatient  Medications   Medication Sig Dispense Refill   • celecoxib (CeleBREX) 200 MG capsule Take 1 capsule by mouth Daily 90 capsule 3   • Cholecalciferol (VITAMIN D3) 5000 UNITS capsule capsule Take 2,000 Units by mouth Daily.     • fenofibrate 160 MG tablet Take 1 tablet by mouth Daily for triglycerides 90 tablet 2   • fluticasone (FLONASE) 50 MCG/ACT nasal spray Use 2 sprays into the nostril(s) as directed by provider Daily. 16 mL 3   • lamoTRIgine (LaMICtal) 200 MG tablet Take 1 tablet by mouth Daily 90 tablet 3   • lisinopril (PRINIVIL,ZESTRIL) 20 MG tablet Take 1 tablet by mouth daily 90 tablet 2   • multivitamin with minerals tablet tablet Take 1 tablet by mouth Daily.     • omeprazole (priLOSEC) 20 MG capsule Take 1 capsule by mouth Daily. 90 capsule 0   • Probiotic Product (PROBIOTIC PO) Take 1 capsule by mouth Daily. PT TO HOLD MED PRIOR TO OR     • Turmeric (QC Tumeric Complex) 500 MG capsule Take  by mouth.     • vitamin B-12 (CYANOCOBALAMIN) 1000 MCG tablet Take 5,000 mcg by mouth Daily.       No current facility-administered medications for this visit.     Past Surgical History:   Procedure Laterality Date   • COLONOSCOPY  ?   • HERNIA REPAIR     • JOINT REPLACEMENT  2017    Right knee   • KNEE SURGERY Right    • AL TOTAL KNEE ARTHROPLASTY N/A 2017    Procedure: RIGHT TOTAL KNEE ARTHROPLASTY, LEFT KNEE INJECTED WITH CORTISONE;  Surgeon: Bradford Torres MD;  Location: [facility];  Service: Orthopedics   • TONSILLECTOMY     • VASECTOMY        Social History     Tobacco Use   • Smoking status: Former Smoker     Packs/day: 0.25     Years: 3.00     Pack years: 0.75     Types: Cigarettes, Pipe, Cigars     Quit date:      Years since quittin.5   • Smokeless tobacco: Never Used   • Tobacco comment: smoked as a teenager   Vaping Use   • Vaping Use: Never used   Substance Use Topics   • Alcohol use: Yes     Alcohol/week: 3.0 standard drinks     Types: 3 Cans of beer per week      "Comment: socially   • Drug use: Never     Family History   Problem Relation Age of Onset   • Diabetes Mother    • Hypertension Mother    • Colon polyps Father    • Stroke Father    • Hypertension Sister    • Rheum arthritis Sister    • Arthritis Sister    • Malig Hyperthermia Neg Hx      Health Maintenance Due   Topic Date Due   • ZOSTER VACCINE (1 of 2) Never done   • HEPATITIS C SCREENING  Never done   • COVID-19 Vaccine (2 - Booster for Jagdeep series) 07/09/2021      Immunization History   Administered Date(s) Administered   • COVID-19 (JAGDEEP) 05/14/2021   • TD Preservative Free 03/10/2017        Objective     Vitals:    03/02/22 0828   BP: 130/73   BP Location: Left arm   Patient Position: Sitting   Pulse: 75   Temp: 98.1 °F (36.7 °C)   SpO2: 98%   Weight: 133 kg (293 lb 3.2 oz)   Height: 177.8 cm (70\")     Body mass index is 42.07 kg/m².     Physical Exam  Vitals reviewed.   Constitutional:       Appearance: Normal appearance. He is obese.   HENT:      Head: Normocephalic.   Eyes:      Pupils: Pupils are equal, round, and reactive to light.   Cardiovascular:      Rate and Rhythm: Normal rate and regular rhythm.      Heart sounds: No murmur heard.      Pulmonary:      Effort: Pulmonary effort is normal.      Breath sounds: Normal breath sounds.   Abdominal:      General: Abdomen is protuberant.      Tenderness: There is abdominal tenderness in the epigastric area.   Musculoskeletal:         General: Normal range of motion.   Neurological:      Mental Status: He is alert.   Psychiatric:         Mood and Affect: Mood normal.         Behavior: Behavior normal.           Result Review :                               Assessment and Plan      Diagnoses and all orders for this visit:    1. Diarrhea, unspecified type (Primary)  Comments:  will check stool studies, if negative, will try colestipol for short trial if no improvmenet referral   Orders:  -     Comprehensive metabolic panel; Future  -     CBC w AUTO " Differential; Future  -     Enteric Bacterial Panel - Stool, Per Rectum; Future  -     Clostridium Difficile Toxin, PCR - Stool, Per Rectum; Future    2. History of COVID-19  Comments:  may be related to his current flares of diarrhea , but consider probiotics/ colestipol / referral     3. Class 3 severe obesity due to excess calories without serious comorbidity with body mass index (BMI) of 40.0 to 44.9 in adult (HCC)              Follow Up     Return if symptoms worsen or fail to improve, for Pending lab results.

## 2022-03-03 LAB
C COLI+JEJ+UPSA DNA STL QL NAA+NON-PROBE: NOT DETECTED
EC STX1+STX2 GENES STL QL NAA+NON-PROBE: NOT DETECTED
S ENT+BONG DNA STL QL NAA+NON-PROBE: NOT DETECTED
SHIGELLA SP+EIEC IPAH ST NAA+NON-PROBE: NOT DETECTED

## 2022-03-04 DIAGNOSIS — R19.7 DIARRHEA, UNSPECIFIED TYPE: Primary | ICD-10-CM

## 2022-03-04 RX ORDER — MONTELUKAST SODIUM 4 MG/1
1 TABLET, CHEWABLE ORAL 2 TIMES DAILY WITH MEALS
Qty: 14 TABLET | Refills: 0 | Status: SHIPPED | OUTPATIENT
Start: 2022-03-04 | End: 2022-03-13 | Stop reason: SDUPTHER

## 2022-03-13 DIAGNOSIS — R19.7 DIARRHEA, UNSPECIFIED TYPE: ICD-10-CM

## 2022-03-13 RX ORDER — MONTELUKAST SODIUM 4 MG/1
1 TABLET, CHEWABLE ORAL
Qty: 21 TABLET | Refills: 0 | Status: SHIPPED | OUTPATIENT
Start: 2022-03-13 | End: 2022-04-05 | Stop reason: SDUPTHER

## 2022-03-15 ENCOUNTER — OFFICE VISIT (OUTPATIENT)
Dept: GASTROENTEROLOGY | Facility: CLINIC | Age: 57
End: 2022-03-15

## 2022-03-15 ENCOUNTER — HOSPITAL ENCOUNTER (OUTPATIENT)
Facility: HOSPITAL | Age: 57
Setting detail: HOSPITAL OUTPATIENT SURGERY
End: 2022-03-15
Attending: INTERNAL MEDICINE | Admitting: INTERNAL MEDICINE

## 2022-03-15 VITALS
WEIGHT: 293.4 LBS | DIASTOLIC BLOOD PRESSURE: 91 MMHG | SYSTOLIC BLOOD PRESSURE: 143 MMHG | BODY MASS INDEX: 43.45 KG/M2 | HEIGHT: 69 IN | HEART RATE: 70 BPM

## 2022-03-15 DIAGNOSIS — R19.7 DIARRHEA, UNSPECIFIED TYPE: Primary | ICD-10-CM

## 2022-03-15 DIAGNOSIS — R11.11 VOMITING WITHOUT NAUSEA, INTRACTABILITY OF VOMITING NOT SPECIFIED, UNSPECIFIED VOMITING TYPE: ICD-10-CM

## 2022-03-15 DIAGNOSIS — K22.70 BARRETT'S ESOPHAGUS WITHOUT DYSPLASIA: ICD-10-CM

## 2022-03-15 PROCEDURE — 99214 OFFICE O/P EST MOD 30 MIN: CPT | Performed by: NURSE PRACTITIONER

## 2022-03-15 RX ORDER — POLYETHYLENE GLYCOL 3350, SODIUM SULFATE, SODIUM CHLORIDE, POTASSIUM CHLORIDE, ASCORBIC ACID, SODIUM ASCORBATE 140-9-5.2G
1 KIT ORAL TAKE AS DIRECTED
Qty: 3 EACH | Refills: 0 | Status: SHIPPED | OUTPATIENT
Start: 2022-03-15 | End: 2022-04-29

## 2022-03-15 NOTE — PROGRESS NOTES
"Patient Name: Anthony Brown   Visit Date: 03/15/2022   Patient ID: [id number]  Provider: RAY Solitario    Sex: male  Location:  Location Address:  Location Phone: [address] [place] [address] [phone]    YOB: 1965      Primary Care Provider Mercedes Armstrong APRN      Referring Provider: No ref. provider found        Chief Complaint  Diarrhea and Vomiting    History of Present Illness  Anthony Brown is a 56 y.o. who presents to [facility] on referral from No ref. provider found for a gastroenterology evaluation of Diarrhea and Vomiting.    Mr. Brown reports being diagnosed with covid in the middle of January and developing diarrhea shortly after.  Reports he has had diarrhea since then however frequency of BMs are not consistent.  Stool is always loose.  PCP recently started patient on colestipol which he did feel helped at first taking twice daily however is now increased to TID due to breakthrough diarrhea.  Denies any hematochezia, melena, or abdominal pain.  Most recent stool studies negative.    Patient also reports 3 episodes of vomiting with diarrhea since he was diagnosed with Covid.  Episodes of vomiting last approximately 6 to 8 hours.  Expresses he does not feel nauseated before vomiting.  He does have a history of Abreu's esophagus with last EGD being in 2019 at [facility], requesting records.  Currently taking Prilosec daily and feels that that controls his heartburn.  Taking Celebrex daily as well for several years now due to right sided body pain.  Reports the pain is \"unexplained\" as he has had multiple tests ran and they were all negative.      Labs Result Review Imaging    Past Medical History:   Diagnosis Date   • Acute URI    • Arthritis     bialteral knees   • Abreu's esophagus with dysplasia, unspecified    • Bipolar disorder (HCC)    • Contact with and (suspected) exposure to other viral communicable " diseases    • Depression    • Deviated nasal septum    • GERD (gastroesophageal reflux disease) 2008   • High cholesterol    • History of chest pain     6 YEARS AGO, HAD STRESS TEST NORMAL   • Hypertension    • Low back pain    • Low testosterone level in male     HAS TESTOSTERONE PELLETS IMPLANTED   • Obesity    • Pain in joint     UNSPECIFIED   • Sciatica    • Sinusitis    • Sleep apnea with use of continuous positive airway pressure (CPAP)    • Sleep apnea, unspecified    • SOB (shortness of breath)    • Testicular hypofunction    • Testosterone deficiency        Past Surgical History:   Procedure Laterality Date   • COLONOSCOPY  2019?   • HERNIA REPAIR     • JOINT REPLACEMENT  June 2017    Right knee   • KNEE SURGERY Right 1985   • MT TOTAL KNEE ARTHROPLASTY N/A 6/26/2017    Procedure: RIGHT TOTAL KNEE ARTHROPLASTY, LEFT KNEE INJECTED WITH CORTISONE;  Surgeon: Bradford Torres MD;  Location: [facility];  Service: Orthopedics   • TONSILLECTOMY  1970   • VASECTOMY  1998         Current Outpatient Medications:   •  celecoxib (CeleBREX) 200 MG capsule, Take 1 capsule by mouth Daily, Disp: 90 capsule, Rfl: 3  •  Cholecalciferol (VITAMIN D3) 5000 UNITS capsule capsule, Take 2,000 Units by mouth Daily., Disp: , Rfl:   •  colestipol (COLESTID) 1 g tablet, Take 1 tablet by mouth 3 (Three) Times a Day With Meals for 7 days., Disp: 21 tablet, Rfl: 0  •  fenofibrate 160 MG tablet, Take 1 tablet by mouth Daily for triglycerides, Disp: 90 tablet, Rfl: 2  •  fluticasone (FLONASE) 50 MCG/ACT nasal spray, Use 2 sprays into the nostril(s) as directed by provider Daily., Disp: 16 mL, Rfl: 3  •  lamoTRIgine (LaMICtal) 200 MG tablet, Take 1 tablet by mouth Daily, Disp: 90 tablet, Rfl: 3  •  lisinopril (PRINIVIL,ZESTRIL) 20 MG tablet, Take 1 tablet by mouth daily, Disp: 90 tablet, Rfl: 2  •  multivitamin with minerals tablet tablet, Take 1 tablet by mouth Daily., Disp: , Rfl:   •  omeprazole (priLOSEC) 20 MG capsule, Take 1 capsule  "by mouth Daily., Disp: 90 capsule, Rfl: 0  •  Probiotic Product (PROBIOTIC PO), Take 1 capsule by mouth Daily. PT TO HOLD MED PRIOR TO OR, Disp: , Rfl:   •  Turmeric 500 MG capsule, Take  by mouth., Disp: , Rfl:   •  vitamin B-12 (CYANOCOBALAMIN) 1000 MCG tablet, Take 5,000 mcg by mouth Daily., Disp: , Rfl:   •  PEG-KCl-NaCl-NaSulf-Na Asc-C (Plenvu) 140 g reconstituted solution solution, Take 140 g by mouth Take As Directed., Disp: 3 each, Rfl: 0     No Known Allergies    Family History   Problem Relation Age of Onset   • Diabetes Mother    • Hypertension Mother    • Colon polyps Father    • Stroke Father    • Hypertension Sister    • Rheum arthritis Sister    • Arthritis Sister    • Malig Hyperthermia Neg Hx         Social History     Social History Narrative   • Not on file         Objective     Review of Systems   Constitutional: Negative for appetite change and unexpected weight loss.   Gastrointestinal: Positive for diarrhea, vomiting and GERD.        Vital Signs:   /91 (BP Location: Left arm, Patient Position: Sitting, Cuff Size: Large Adult)   Pulse 70   Ht 175.3 cm (69\")   Wt 133 kg (293 lb 6.4 oz)   BMI 43.33 kg/m²       Physical Exam  Constitutional:       General: He is not in acute distress.     Appearance: Normal appearance. He is well-developed and normal weight.   HENT:      Head: Normocephalic and atraumatic.   Eyes:      Conjunctiva/sclera: Conjunctivae normal.      Pupils: Pupils are equal, round, and reactive to light.      Visual Fields: Right eye visual fields normal and left eye visual fields normal.   Cardiovascular:      Rate and Rhythm: Normal rate and regular rhythm.      Heart sounds: Normal heart sounds.   Pulmonary:      Effort: Pulmonary effort is normal. No retractions.      Breath sounds: Normal breath sounds and air entry.   Abdominal:      General: Bowel sounds are normal. There is no distension.      Palpations: Abdomen is soft.      Tenderness: There is no abdominal " tenderness.      Comments: No appreciable hepatosplenomegaly or ascites   Musculoskeletal:         General: Normal range of motion.      Cervical back: Normal range of motion and neck supple.   Skin:     General: Skin is warm and dry.   Neurological:      Mental Status: He is alert and oriented to person, place, and time.   Psychiatric:         Mood and Affect: Mood and affect normal.         Behavior: Behavior normal.         Result Review :   The following data was reviewed by: RAY Solitario on 03/15/2022:  CBC w/diff    CBC w/Diff 9/2/21 3/2/22   WBC 5.04 6.44   RBC 4.93 5.32   Hemoglobin 14.8 15.9   Hematocrit 42.6 46.2   MCV 86.4 86.8   MCH 30.0 29.9   MCHC 34.7 34.4   RDW 13.1 13.1   Platelets 237 270   Neutrophil Rel % 55.4 51.2   Immature Granulocyte Rel % 0.6 (A) 0.9 (A)   Lymphocyte Rel % 31.5 28.6   Monocyte Rel % 10.1 8.1   Eosinophil Rel % 1.8 10.7 (A)   Basophil Rel % 0.6 0.5   (A) Abnormal value            CMP    CMP 9/2/21 3/2/22   Glucose 109 (A) 120 (A)   BUN 19 14   Creatinine 1.04 1.01   eGFR Non African Am 74    Sodium 138 141   Potassium 3.7 4.3   Chloride 104 106   Calcium 9.6 10.0   Albumin 4.20 4.30   Total Bilirubin 0.2 0.3   Alkaline Phosphatase 76 87   AST (SGOT) 22 27   ALT (SGPT) 33 54 (A)   (A) Abnormal value       Comments are available for some flowsheets but are not being displayed.           Sed Rate   Date Value Ref Range Status   10/20/2021 3 0 - 20 mm/hr Final     ds DNA Antibody   Date Value Ref Range Status   10/12/2020 NEGATIVE [IU]/mL Final             Assessment and Plan    Diagnoses and all orders for this visit:    1. Diarrhea, unspecified type (Primary)  -     Case Request; Standing  -     Case Request    2. Vomiting without nausea, intractability of vomiting not specified, unspecified vomiting type  -     Case Request; Standing  -     Case Request    3. Abreu's esophagus without dysplasia  -     Case Request; Standing  -     Case Request    Other orders  -      Follow Anesthesia Guidelines / Protocol; Future  -     Obtain Informed Consent; Future  -     PEG-KCl-NaCl-NaSulf-Na Asc-C (Plenvu) 140 g reconstituted solution solution; Take 140 g by mouth Take As Directed.  Dispense: 3 each; Refill: 0      ESOPHAGOGASTRODUODENOSCOPY (N/A), COLONOSCOPY (N/A)       Follow Up   Return for Follow up after procedure.  Patient was given instructions and counseling regarding his condition or for health maintenance advice. Please see specific information pulled into the AVS if appropriate.

## 2022-03-22 DIAGNOSIS — E78.00 HIGH CHOLESTEROL: ICD-10-CM

## 2022-03-22 RX ORDER — FENOFIBRATE 160 MG/1
160 TABLET ORAL DAILY
Qty: 90 TABLET | Refills: 0 | Status: SHIPPED | OUTPATIENT
Start: 2022-03-22 | End: 2022-07-07 | Stop reason: SDUPTHER

## 2022-03-24 ENCOUNTER — PATIENT ROUNDING (BHMG ONLY) (OUTPATIENT)
Dept: GASTROENTEROLOGY | Facility: CLINIC | Age: 57
End: 2022-03-24

## 2022-03-24 NOTE — PROGRESS NOTES
March 24, 2022    Hello, may I speak with Anthony OCONNOR English?    My name is Jania     I am  with [facility]  [address] [place] [address]  [place] [address] [phone].    Before we get started may I verify your date of birth? 1965    I am calling to officially welcome you to our practice and ask about your recent visit. Is this a good time to talk?  Yes     Tell me about your visit with us. What things went well? It all went well      We're always looking for ways to make our patients' experiences even better. Do you have recommendations on ways we may improve?  No     Overall were you satisfied with your first visit to our practice? Yes      I appreciate you taking the time to speak with me today. Is there anything else I can do for you? No       Thank you, and have a great day.

## 2022-04-04 DIAGNOSIS — K22.719 BARRETT'S ESOPHAGUS WITH DYSPLASIA, UNSPECIFIED: ICD-10-CM

## 2022-04-04 RX ORDER — OMEPRAZOLE 20 MG/1
20 CAPSULE, DELAYED RELEASE ORAL DAILY
Qty: 90 CAPSULE | Refills: 0 | Status: SHIPPED | OUTPATIENT
Start: 2022-04-04 | End: 2022-07-07 | Stop reason: SDUPTHER

## 2022-04-04 RX ORDER — LISINOPRIL 20 MG/1
TABLET ORAL
Qty: 90 TABLET | Refills: 0 | Status: SHIPPED | OUTPATIENT
Start: 2022-04-04 | End: 2022-07-07 | Stop reason: SDUPTHER

## 2022-04-05 DIAGNOSIS — R19.7 DIARRHEA, UNSPECIFIED TYPE: ICD-10-CM

## 2022-04-05 RX ORDER — MONTELUKAST SODIUM 4 MG/1
1 TABLET, CHEWABLE ORAL 2 TIMES DAILY WITH MEALS
Qty: 60 TABLET | Refills: 1 | Status: SHIPPED | OUTPATIENT
Start: 2022-04-05 | End: 2022-04-29 | Stop reason: SDUPTHER

## 2022-04-15 ENCOUNTER — TRANSCRIBE ORDERS (OUTPATIENT)
Dept: ADMINISTRATIVE | Facility: HOSPITAL | Age: 57
End: 2022-04-15

## 2022-04-15 ENCOUNTER — LAB (OUTPATIENT)
Dept: LAB | Facility: HOSPITAL | Age: 57
End: 2022-04-15

## 2022-04-15 DIAGNOSIS — Z79.899 ENCOUNTER FOR LONG-TERM (CURRENT) USE OF OTHER MEDICATIONS: ICD-10-CM

## 2022-04-15 DIAGNOSIS — Z79.899 ENCOUNTER FOR LONG-TERM (CURRENT) USE OF OTHER MEDICATIONS: Primary | ICD-10-CM

## 2022-04-15 LAB — ERYTHROCYTE [SEDIMENTATION RATE] IN BLOOD: 9 MM/HR (ref 0–20)

## 2022-04-15 PROCEDURE — 85652 RBC SED RATE AUTOMATED: CPT

## 2022-04-15 PROCEDURE — 36415 COLL VENOUS BLD VENIPUNCTURE: CPT

## 2022-04-29 ENCOUNTER — OFFICE VISIT (OUTPATIENT)
Dept: FAMILY MEDICINE CLINIC | Age: 57
End: 2022-04-29

## 2022-04-29 VITALS
DIASTOLIC BLOOD PRESSURE: 61 MMHG | HEIGHT: 69 IN | HEART RATE: 86 BPM | WEIGHT: 293 LBS | TEMPERATURE: 97.9 F | BODY MASS INDEX: 43.4 KG/M2 | SYSTOLIC BLOOD PRESSURE: 131 MMHG

## 2022-04-29 DIAGNOSIS — R19.7 DIARRHEA, UNSPECIFIED TYPE: ICD-10-CM

## 2022-04-29 DIAGNOSIS — K29.80 DUODENITIS: Primary | ICD-10-CM

## 2022-04-29 PROCEDURE — 99214 OFFICE O/P EST MOD 30 MIN: CPT | Performed by: NURSE PRACTITIONER

## 2022-04-29 RX ORDER — MONTELUKAST SODIUM 4 MG/1
1 TABLET, CHEWABLE ORAL 2 TIMES DAILY WITH MEALS
Qty: 60 TABLET | Refills: 3 | Status: SHIPPED | OUTPATIENT
Start: 2022-04-29 | End: 2022-10-17 | Stop reason: SDUPTHER

## 2022-04-29 NOTE — PROGRESS NOTES
"Answers for HPI/ROS submitted by the patient on 4/22/2022  What is the primary reason for your visit?: Other  Please describe your symptoms.: Follow up on last visit after testing  Have you had these symptoms before?: Yes  How long have you been having these symptoms?: Greater than 2 weeks    Assessment and Plan    Diagnoses and all orders for this visit:    1. Duodenitis (Primary)  Comments:  I have encouraged Jeremy to continue the colestid to help with stools, avoid spicy irritating foods, continue omeprazole and follow up with GS/Gastro if persists    2. Diarrhea, unspecified type  Comments:  stay well hydrated   Orders:  -     colestipol (COLESTID) 1 g tablet; Take 1 tablet by mouth 2 (Two) Times a Day With Meals.  Dispense: 60 tablet; Refill: 3        Follow Up   No follow-ups on file.    Chief Complaint  Anthony Brown presents to [facility] for Diarrhea (Pt states he had covid back in January and ever since then has had diarrhea, f/u colonoscopy )    Subjective          Jeremy is here for follow up on diarrhea. Recently had colonoscopy with Dr. Percy Francisco  - results showed possible duodenitis.  Jeremy reports that the diarrhea started after he had a covid infection.  I did place him on colestid and her reports that the stool changed from 'water to chili' and has a few times been back to normal.  He is taking probiotic as well.  He does not have any N/V to speak of any longer.  He is taking colestid 2 times per day.  He does not have the urgency with BMs like before.  He is on omeprazole daily         Review of Systems    Objective     Vitals:    04/29/22 1558   BP: 131/61   BP Location: Left arm   Patient Position: Sitting   Pulse: 86   Temp: 97.9 °F (36.6 °C)   Weight: 133 kg (293 lb)   Height: 175.3 cm (69.02\")     Body mass index is 43.25 kg/m².     Physical Exam  Vitals reviewed.   Constitutional:       Appearance: Normal appearance. He is obese.   HENT:      Head: " Normocephalic.   Eyes:      Pupils: Pupils are equal, round, and reactive to light.   Cardiovascular:      Rate and Rhythm: Normal rate and regular rhythm.      Heart sounds: No murmur heard.  Pulmonary:      Effort: Pulmonary effort is normal.      Breath sounds: Normal breath sounds.   Abdominal:      General: Abdomen is protuberant. Bowel sounds are increased.   Musculoskeletal:         General: Normal range of motion.   Neurological:      Mental Status: He is alert.   Psychiatric:         Mood and Affect: Mood normal.         Behavior: Behavior normal.         Result Review :                    No Known Allergies   Past Medical History:   Diagnosis Date   • Acute URI    • Arthritis     bialteral knees   • Abreu's esophagus with dysplasia, unspecified    • Bipolar disorder (HCC)    • Contact with and (suspected) exposure to other viral communicable diseases    • Depression    • Deviated nasal septum    • GERD (gastroesophageal reflux disease) 2008   • High cholesterol    • History of chest pain     6 YEARS AGO, HAD STRESS TEST NORMAL   • Hypertension    • Low back pain    • Low testosterone level in male     HAS TESTOSTERONE PELLETS IMPLANTED   • Obesity    • Pain in joint     UNSPECIFIED   • Sciatica    • Sinusitis    • Sleep apnea with use of continuous positive airway pressure (CPAP)    • Sleep apnea, unspecified    • SOB (shortness of breath)    • Testicular hypofunction    • Testosterone deficiency      Current Outpatient Medications   Medication Sig Dispense Refill   • celecoxib (CeleBREX) 200 MG capsule Take 1 capsule by mouth Daily 90 capsule 3   • Cholecalciferol (VITAMIN D3) 5000 UNITS capsule capsule Take 2,000 Units by mouth Daily.     • colestipol (COLESTID) 1 g tablet Take 1 tablet by mouth 2 (Two) Times a Day With Meals. 60 tablet 3   • fenofibrate 160 MG tablet Take 1 tablet by mouth Daily. 90 tablet 0   • fluticasone (FLONASE) 50 MCG/ACT nasal spray Use 2 sprays into the nostril(s) as directed  by provider Daily. 16 mL 3   • lamoTRIgine (LaMICtal) 200 MG tablet Take 1 tablet by mouth Daily 90 tablet 3   • lisinopril (PRINIVIL,ZESTRIL) 20 MG tablet Take 1 tablet by mouth daily 90 tablet 0   • multivitamin with minerals tablet tablet Take 1 tablet by mouth Daily.     • omeprazole (priLOSEC) 20 MG capsule Take 1 capsule by mouth Daily. 90 capsule 0   • predniSONE (DELTASONE) 5 MG tablet Take 1 tablet by mouth daily. 30 tablet 1   • Probiotic Product (PROBIOTIC PO) Take 1 capsule by mouth Daily. PT TO HOLD MED PRIOR TO OR     • Turmeric 500 MG capsule Take  by mouth.     • vitamin B-12 (CYANOCOBALAMIN) 1000 MCG tablet Take 5,000 mcg by mouth Daily.       No current facility-administered medications for this visit.     Past Surgical History:   Procedure Laterality Date   • COLONOSCOPY  ?   • HERNIA REPAIR     • JOINT REPLACEMENT  2017    Right knee   • KNEE SURGERY Right    • NE TOTAL KNEE ARTHROPLASTY N/A 2017    Procedure: RIGHT TOTAL KNEE ARTHROPLASTY, LEFT KNEE INJECTED WITH CORTISONE;  Surgeon: Bradford Torres MD;  Location: [facility];  Service: Orthopedics   • TONSILLECTOMY     • VASECTOMY        Social History     Tobacco Use   • Smoking status: Former Smoker     Packs/day: 0.25     Years: 3.00     Pack years: 0.75     Types: Cigarettes, Pipe, Cigars     Quit date:      Years since quittin.6   • Smokeless tobacco: Never Used   • Tobacco comment: smoked as a teenager   Vaping Use   • Vaping Use: Never used   Substance Use Topics   • Alcohol use: Yes     Alcohol/week: 3.0 standard drinks     Types: 3 Cans of beer per week     Comment: socially   • Drug use: Never     Family History   Problem Relation Age of Onset   • Diabetes Mother    • Hypertension Mother    • Colon polyps Father    • Stroke Father    • Hypertension Sister    • Rheum arthritis Sister    • Arthritis Sister    • Malig Hyperthermia Neg Hx      Health Maintenance Due   Topic Date Due   • ZOSTER VACCINE  (1 of 2) Never done   • HEPATITIS C SCREENING  Never done   • COVID-19 Vaccine (2 - Booster for Jagdeep series) 07/09/2021      Immunization History   Administered Date(s) Administered   • COVID-19 (JAGDEEP) 05/14/2021   • TD Preservative Free 03/10/2017

## 2022-05-18 ENCOUNTER — TRANSCRIBE ORDERS (OUTPATIENT)
Dept: ADMINISTRATIVE | Facility: HOSPITAL | Age: 57
End: 2022-05-18

## 2022-05-18 ENCOUNTER — LAB (OUTPATIENT)
Dept: LAB | Facility: HOSPITAL | Age: 57
End: 2022-05-18

## 2022-05-18 DIAGNOSIS — R20.0 NUMBNESS: ICD-10-CM

## 2022-05-18 DIAGNOSIS — M25.50 PAIN IN JOINT, MULTIPLE SITES: ICD-10-CM

## 2022-05-18 DIAGNOSIS — M25.50 PAIN IN JOINT, MULTIPLE SITES: Primary | ICD-10-CM

## 2022-05-18 PROCEDURE — 36415 COLL VENOUS BLD VENIPUNCTURE: CPT

## 2022-05-18 PROCEDURE — 84439 ASSAY OF FREE THYROXINE: CPT

## 2022-05-18 PROCEDURE — 84443 ASSAY THYROID STIM HORMONE: CPT

## 2022-05-18 PROCEDURE — 85652 RBC SED RATE AUTOMATED: CPT

## 2022-05-19 LAB
ERYTHROCYTE [SEDIMENTATION RATE] IN BLOOD: 9 MM/HR (ref 0–20)
T4 FREE SERPL-MCNC: 1.19 NG/DL (ref 0.93–1.7)
TSH SERPL DL<=0.05 MIU/L-ACNC: 1.81 UIU/ML (ref 0.27–4.2)

## 2022-06-24 ENCOUNTER — TELEPHONE (OUTPATIENT)
Dept: FAMILY MEDICINE CLINIC | Age: 57
End: 2022-06-24

## 2022-06-24 ENCOUNTER — OFFICE VISIT (OUTPATIENT)
Dept: FAMILY MEDICINE CLINIC | Age: 57
End: 2022-06-24

## 2022-06-24 ENCOUNTER — LAB (OUTPATIENT)
Dept: LAB | Facility: HOSPITAL | Age: 57
End: 2022-06-24

## 2022-06-24 VITALS
DIASTOLIC BLOOD PRESSURE: 87 MMHG | TEMPERATURE: 99 F | BODY MASS INDEX: 43.19 KG/M2 | HEART RATE: 72 BPM | WEIGHT: 291.6 LBS | SYSTOLIC BLOOD PRESSURE: 145 MMHG | HEIGHT: 69 IN

## 2022-06-24 DIAGNOSIS — R20.2 PARESTHESIA OF BOTH FEET: Primary | ICD-10-CM

## 2022-06-24 DIAGNOSIS — R19.7 DIARRHEA, UNSPECIFIED TYPE: ICD-10-CM

## 2022-06-24 PROCEDURE — 86003 ALLG SPEC IGE CRUDE XTRC EA: CPT

## 2022-06-24 PROCEDURE — 36415 COLL VENOUS BLD VENIPUNCTURE: CPT

## 2022-06-24 PROCEDURE — 99214 OFFICE O/P EST MOD 30 MIN: CPT | Performed by: NURSE PRACTITIONER

## 2022-06-24 NOTE — TELEPHONE ENCOUNTER
Pt has filled out incomplete FMLA form.  The incomplete form has been scanned in his chart and the $20 fee has been paid.  The form has also been sent to [facility]'s Mailbox

## 2022-06-24 NOTE — PROGRESS NOTES
"Answers for HPI/ROS submitted by the patient on 6/21/2022  What is the primary reason for your visit?: Other  Please describe your symptoms.: Almost continuous pain in my feet. Continued problems with diarrhea  Have you had these symptoms before?: Yes  How long have you been having these symptoms?: Greater than 2 weeks    Assessment and Plan    Diagnoses and all orders for this visit:    1. Paresthesia of both feet (Primary)  Comments:  We will get a nerve conduction study to determine if there is an underlying cause otherwise consider referral to podiatry  Orders:  -     Nerve Conduction Test 1 - 2; Future    2. Diarrhea, unspecified type  Comments:  We will check a food allergy panel just to ensure that this may not be food allergy related.  I would encourage him to follow-up with gastroenterology  Orders:  -     Food Allergy Profile; Future        Follow Up   No follow-ups on file.    Chief Complaint  Anthony Brown presents to [facility] for Diarrhea (F/u from 3/2 visit ) and Foot Pain    Kira Luna is here today concerns over bilateral foot pain.  He reports its been going on off and on for several months and of which is caused him to have to miss work however over the last couple weeks he has had to repeatedly be off work secondary to the discomfort and inability to stand for long prolonged periods on his feet on concrete.  He reports the pain feels like pins-and-needles and sometimes will feel like a sharp stabbing pain in various locations on the plantar surface of his foot.  He does have a history of plantar fasciitis however this does not feel similar to his previous diagnosis.  He does report that the pain is worse upon standing and will improve with ambulation.  He states that he feels like his arches are \"over arched \"but will resolve with continued ambulation.   He is requesting short term disability due to being  Off work from 6/13/2022- ? When we are " "able to determine the source of his pain.      Regarding diarrhea, he reports that the colestipol is helping with his symptoms.  It was noted recently on his CBC that his eosinophils were elevated. He has never been tested for food allergy in the past but does not associate his symptoms with any one specific food or food category.          Review of Systems    Objective     Vitals:    06/24/22 1003   BP: 145/87   BP Location: Right arm   Patient Position: Sitting   Pulse: 72   Temp: 99 °F (37.2 °C)   TempSrc: Oral   Weight: 132 kg (291 lb 9.6 oz)   Height: 175.3 cm (69\")     Body mass index is 43.06 kg/m².     Physical Exam  Vitals reviewed.   Constitutional:       Appearance: Normal appearance.   HENT:      Head: Normocephalic.   Eyes:      Pupils: Pupils are equal, round, and reactive to light.   Cardiovascular:      Rate and Rhythm: Normal rate and regular rhythm.      Heart sounds: No murmur heard.  Pulmonary:      Effort: Pulmonary effort is normal.      Breath sounds: Normal breath sounds.   Abdominal:      General: Abdomen is protuberant. Bowel sounds are normal.   Musculoskeletal:         General: Normal range of motion.   Neurological:      Mental Status: He is alert.   Psychiatric:         Mood and Affect: Mood normal.         Behavior: Behavior normal.         Result Review :                    No Known Allergies   Past Medical History:   Diagnosis Date   • Acute URI    • Arthritis     bialteral knees   • Abreu's esophagus with dysplasia, unspecified    • Bipolar disorder (HCC)    • Colon polyp 2019?   • Contact with and (suspected) exposure to other viral communicable diseases    • Depression    • Deviated nasal septum    • GERD (gastroesophageal reflux disease) 2008   • High cholesterol    • History of chest pain     6 YEARS AGO, HAD STRESS TEST NORMAL   • Hypertension    • Low back pain    • Low testosterone level in male     HAS TESTOSTERONE PELLETS IMPLANTED   • Obesity    • Pain in joint     " UNSPECIFIED   • Sciatica    • Sinusitis    • Sleep apnea with use of continuous positive airway pressure (CPAP)    • Sleep apnea, unspecified    • SOB (shortness of breath)    • Testicular hypofunction    • Testosterone deficiency      Current Outpatient Medications   Medication Sig Dispense Refill   • celecoxib (CeleBREX) 200 MG capsule Take 1 capsule by mouth Daily 90 capsule 3   • Cholecalciferol (VITAMIN D3) 5000 UNITS capsule capsule Take 2,000 Units by mouth Daily.     • colestipol (COLESTID) 1 g tablet Take 1 tablet by mouth 2 (Two) Times a Day With Meals. 60 tablet 3   • fenofibrate 160 MG tablet Take 1 tablet by mouth Daily. 90 tablet 0   • fluticasone (FLONASE) 50 MCG/ACT nasal spray Use 2 sprays into the nostril(s) as directed by provider Daily. 16 mL 3   • lamoTRIgine (LaMICtal) 200 MG tablet Take 1 tablet by mouth Daily 90 tablet 3   • lisinopril (PRINIVIL,ZESTRIL) 20 MG tablet Take 1 tablet by mouth daily 90 tablet 0   • multivitamin with minerals tablet tablet Take 1 tablet by mouth Daily.     • omeprazole (priLOSEC) 20 MG capsule Take 1 capsule by mouth Daily. 90 capsule 0   • predniSONE (DELTASONE) 5 MG tablet Take 1 tablet by mouth daily. 30 tablet 1   • Probiotic Product (PROBIOTIC PO) Take 1 capsule by mouth Daily. PT TO HOLD MED PRIOR TO OR     • Turmeric 500 MG capsule Take  by mouth.     • vitamin B-12 (CYANOCOBALAMIN) 1000 MCG tablet Take 5,000 mcg by mouth Daily.     • amoxicillin (AMOXIL) 500 MG capsule Take 1 capsule by  mouth 3 times daily until gone 30 capsule 0     No current facility-administered medications for this visit.     Past Surgical History:   Procedure Laterality Date   • COLONOSCOPY  2019?   • HERNIA REPAIR     • JOINT REPLACEMENT  June 2017    Right knee   • KNEE SURGERY Right 1985   • OR TOTAL KNEE ARTHROPLASTY N/A 6/26/2017    Procedure: RIGHT TOTAL KNEE ARTHROPLASTY, LEFT KNEE INJECTED WITH CORTISONE;  Surgeon: Bradford Torres MD;  Location: [facility] OR;  Service:  Orthopedics   • TONSILLECTOMY     • VASECTOMY        Social History     Tobacco Use   • Smoking status: Former Smoker     Packs/day: 0.25     Years: 3.00     Pack years: 0.75     Types: Cigarettes, Pipe, Cigars, Cigarettes, Pipe, Cigars     Quit date: 1984     Years since quittin.8   • Smokeless tobacco: Never Used   • Tobacco comment: smoked as a teenager   Vaping Use   • Vaping Use: Never used   Substance Use Topics   • Alcohol use: Yes     Alcohol/week: 3.0 standard drinks     Types: 3 Cans of beer per week     Comment: socially   • Drug use: Never     Family History   Problem Relation Age of Onset   • Diabetes Mother    • Hypertension Mother    • Colon polyps Father    • Stroke Father    • Hypertension Sister    • Rheum arthritis Sister    • Arthritis Sister    • Malig Hyperthermia Neg Hx      Health Maintenance Due   Topic Date Due   • ZOSTER VACCINE (1 of 2) Never done   • HEPATITIS C SCREENING  Never done   • COVID-19 Vaccine (2 - Booster for Jagdeep series) 2021      Immunization History   Administered Date(s) Administered   • COVID-19 (JAGDEEP) 2021   • TD Preservative Free 03/10/2017

## 2022-06-29 ENCOUNTER — TELEPHONE (OUTPATIENT)
Dept: FAMILY MEDICINE CLINIC | Age: 57
End: 2022-06-29

## 2022-07-01 LAB
CLAM IGE QN: 0.38 KU/L
CODFISH IGE QN: <0.1 KU/L
CONV CLASS DESCRIPTION: ABNORMAL
CORN IGE QN: 0.24 KU/L
COW MILK IGE QN: 0.76 KU/L
EGG WHITE IGE QN: <0.1 KU/L
PEANUT IGE QN: 0.15 KU/L
SCALLOP IGE QN: 0.45 KU/L
SESAME SEED IGE QN: 0.28 KU/L
SHRIMP IGE QN: 0.21 KU/L
SOYBEAN IGE QN: 0.11 KU/L
WALNUT IGE QN: <0.1 KU/L
WHEAT IGE QN: 0.14 KU/L

## 2022-07-07 DIAGNOSIS — K22.719 BARRETT'S ESOPHAGUS WITH DYSPLASIA, UNSPECIFIED: ICD-10-CM

## 2022-07-07 DIAGNOSIS — E78.00 HIGH CHOLESTEROL: ICD-10-CM

## 2022-07-07 RX ORDER — FENOFIBRATE 160 MG/1
160 TABLET ORAL DAILY
Qty: 90 TABLET | Refills: 0 | Status: SHIPPED | OUTPATIENT
Start: 2022-07-07 | End: 2022-09-19 | Stop reason: SDUPTHER

## 2022-07-07 RX ORDER — OMEPRAZOLE 20 MG/1
20 CAPSULE, DELAYED RELEASE ORAL DAILY
Qty: 90 CAPSULE | Refills: 0 | Status: SHIPPED | OUTPATIENT
Start: 2022-07-07 | End: 2022-09-19 | Stop reason: SDUPTHER

## 2022-07-07 RX ORDER — LISINOPRIL 20 MG/1
TABLET ORAL
Qty: 90 TABLET | Refills: 0 | Status: SHIPPED | OUTPATIENT
Start: 2022-07-07 | End: 2022-09-19 | Stop reason: SDUPTHER

## 2022-07-22 ENCOUNTER — TELEPHONE (OUTPATIENT)
Dept: GASTROENTEROLOGY | Facility: CLINIC | Age: 57
End: 2022-07-22

## 2022-08-03 ENCOUNTER — OFFICE VISIT (OUTPATIENT)
Dept: FAMILY MEDICINE CLINIC | Age: 57
End: 2022-08-03

## 2022-08-03 VITALS
BODY MASS INDEX: 43.1 KG/M2 | HEIGHT: 69 IN | WEIGHT: 291 LBS | TEMPERATURE: 98.9 F | HEART RATE: 76 BPM | SYSTOLIC BLOOD PRESSURE: 136 MMHG | DIASTOLIC BLOOD PRESSURE: 76 MMHG

## 2022-08-03 DIAGNOSIS — F31.32 BIPOLAR AFFECTIVE DISORDER, CURRENTLY DEPRESSED, MODERATE: ICD-10-CM

## 2022-08-03 DIAGNOSIS — R20.2 PARESTHESIA OF BOTH FEET: ICD-10-CM

## 2022-08-03 DIAGNOSIS — R19.7 DIARRHEA, UNSPECIFIED TYPE: ICD-10-CM

## 2022-08-03 DIAGNOSIS — Z91.018 FOOD ALLERGY: Primary | ICD-10-CM

## 2022-08-03 DIAGNOSIS — G89.29 OTHER CHRONIC PAIN: ICD-10-CM

## 2022-08-03 DIAGNOSIS — K29.80 DUODENITIS: ICD-10-CM

## 2022-08-03 PROCEDURE — 99214 OFFICE O/P EST MOD 30 MIN: CPT | Performed by: NURSE PRACTITIONER

## 2022-08-03 RX ORDER — DULOXETIN HYDROCHLORIDE 20 MG/1
20 CAPSULE, DELAYED RELEASE ORAL DAILY
Qty: 30 CAPSULE | Refills: 1 | Status: SHIPPED | OUTPATIENT
Start: 2022-08-03 | End: 2022-09-23 | Stop reason: SDUPTHER

## 2022-08-03 RX ORDER — DICYCLOMINE HYDROCHLORIDE 10 MG/1
10 CAPSULE ORAL
Qty: 90 CAPSULE | Refills: 0 | Status: SHIPPED | OUTPATIENT
Start: 2022-08-03

## 2022-08-03 NOTE — PROGRESS NOTES
"Assessment and Plan    Diagnoses and all orders for this visit:    1. Food allergy (Primary)  Comments:  He has been advised to avoid foods listed on allergy list, follow up with gastro as recommended     2. Diarrhea, unspecified type  Comments:  will try bentyl, continue to follow up with gastro as scheduled   Orders:  -     dicyclomine (Bentyl) 10 MG capsule; Take 1 capsule by mouth 4 (Four) Times a Day Before Meals & at Bedtime As Needed (diarrhea/urgency).  Dispense: 90 capsule; Refill: 0    3. Duodenitis  Comments:  history of - will try bentyl to see if improves diarrhea / urgency    4. Paresthesia of both feet  Comments:  We will keep Anthony off work until after NCS     5. Bipolar affective disorder, currently depressed, moderate (HCC)  Comments:  I will get him in with Harini Bethea for management of his chronic bipolar disorder  Orders:  -     Ambulatory Referral to Psychiatry    6. Other chronic pain  -     DULoxetine (Cymbalta) 20 MG capsule; Take 1 capsule by mouth Daily.  Dispense: 30 capsule; Refill: 1        Follow Up   Return in about 4 weeks (around 8/31/2022).    Chief Complaint  Anthony Brown presents to [facility] for Pain (\"Had covid 01/26 and I'v been in pain ever since\")    Subjective          Feeling like sporatic stabbing pain and squeezing pain, now having sharp shooting pain in hands and forearms  Nothing in the upper arms.  Most of the pain is in the joints   Feels like increased in frequency after covid from January    He continues to have extreme pain in his legs/feet and is unable to work at the current job which requires standing on feet for 10-12 hours per day   He is currently feeling like his mental health is contributing to his pain.     Anthony reports continued bouts with diarrhea.  He states that it comes out of no where and will be urgent and has, on occasion, been unable to make it to the bathroom in time.  He is currently scheduled to follow up " "with gastroenterology for recommendations.  He has tried colestid, probiotics, anti-diarrheals with no drastic improvement.  He does state that the colestid has somewhat improved the consistency, but still has urgency    No male who reports that he feels like his mental health is on a slow decline.  He would like to 3 resume previous medication.  He feels that much of his symptoms may be attributed to his mental health situation at this point.  He is uncertain if 1 is because or effectively other.        Review of Systems    Objective     Vitals:    08/03/22 1002   BP: 136/76   BP Location: Left arm   Patient Position: Sitting   Pulse: 76   Temp: 98.9 °F (37.2 °C)   Weight: 132 kg (291 lb)   Height: 175.3 cm (69.02\")     Body mass index is 42.95 kg/m².     Physical Exam  Vitals reviewed.   Constitutional:       Appearance: Normal appearance.   HENT:      Head: Normocephalic.   Eyes:      Pupils: Pupils are equal, round, and reactive to light.   Cardiovascular:      Rate and Rhythm: Normal rate and regular rhythm.      Heart sounds: No murmur heard.  Pulmonary:      Effort: Pulmonary effort is normal.      Breath sounds: Normal breath sounds.   Musculoskeletal:         General: Normal range of motion.   Neurological:      Mental Status: He is alert.   Psychiatric:         Mood and Affect: Mood is anxious and depressed.         Behavior: Behavior normal.         Result Review :                    No Known Allergies   Past Medical History:   Diagnosis Date   • Acute URI    • Arthritis     bialteral knees   • Abreu's esophagus with dysplasia, unspecified    • Bipolar disorder (HCC)    • Colon polyp 2019?   • Contact with and (suspected) exposure to other viral communicable diseases    • Depression    • Deviated nasal septum    • GERD (gastroesophageal reflux disease) 2008   • High cholesterol    • History of chest pain     6 YEARS AGO, HAD STRESS TEST NORMAL   • Hypertension    • Low back pain    • Low testosterone " level in male     HAS TESTOSTERONE PELLETS IMPLANTED   • Obesity    • Pain in joint     UNSPECIFIED   • Sciatica    • Sinusitis    • Sleep apnea with use of continuous positive airway pressure (CPAP)    • Sleep apnea, unspecified    • SOB (shortness of breath)    • Testicular hypofunction    • Testosterone deficiency      Current Outpatient Medications   Medication Sig Dispense Refill   • celecoxib (CeleBREX) 200 MG capsule Take 1 capsule by mouth Daily 90 capsule 3   • Cholecalciferol (VITAMIN D3) 5000 UNITS capsule capsule Take 2,000 Units by mouth Daily.     • colestipol (COLESTID) 1 g tablet Take 1 tablet by mouth 2 (Two) Times a Day With Meals. 60 tablet 3   • fenofibrate 160 MG tablet Take 1 tablet by mouth Daily. 90 tablet 0   • HYDROcodone-acetaminophen (NORCO) 5-325 MG per tablet Take 1 tablet by mouth every 4-6 hours as needed for pain 18 tablet 0   • lamoTRIgine (LaMICtal) 200 MG tablet Take 1 tablet by mouth Daily 90 tablet 3   • lisinopril (PRINIVIL,ZESTRIL) 20 MG tablet Take 1 tablet by mouth daily 90 tablet 0   • multivitamin with minerals tablet tablet Take 1 tablet by mouth Daily.     • omeprazole (priLOSEC) 20 MG capsule Take 1 capsule by mouth Daily. 90 capsule 0   • Probiotic Product (PROBIOTIC PO) Take 1 capsule by mouth Daily. PT TO HOLD MED PRIOR TO OR     • Turmeric 500 MG capsule Take  by mouth.     • vitamin B-12 (CYANOCOBALAMIN) 1000 MCG tablet Take 5,000 mcg by mouth Daily.     • dicyclomine (Bentyl) 10 MG capsule Take 1 capsule by mouth 4 (Four) Times a Day Before Meals & at Bedtime As Needed (diarrhea/urgency). 90 capsule 0   • DULoxetine (Cymbalta) 20 MG capsule Take 1 capsule by mouth Daily. 30 capsule 1   • fluticasone (FLONASE) 50 MCG/ACT nasal spray Use 2 sprays into the nostril(s) as directed by provider Daily. 16 g 3     No current facility-administered medications for this visit.     Past Surgical History:   Procedure Laterality Date   • COLONOSCOPY  2019?   • HERNIA REPAIR      • JOINT REPLACEMENT  2017    Right knee   • KNEE SURGERY Right    • [place] TOTAL KNEE ARTHROPLASTY N/A 2017    Procedure: RIGHT TOTAL KNEE ARTHROPLASTY, LEFT KNEE INJECTED WITH CORTISONE;  Surgeon: Bradford Torres MD;  Location: [facility];  Service: Orthopedics   • TONSILLECTOMY     • VASECTOMY        Social History     Tobacco Use   • Smoking status: Former Smoker     Packs/day: 0.25     Years: 3.00     Pack years: 0.75     Types: Cigarettes, Pipe, Cigars, Cigarettes, Pipe, Cigars     Quit date: 1984     Years since quittin.9   • Smokeless tobacco: Never Used   • Tobacco comment: smoked as a teenager   Vaping Use   • Vaping Use: Never used   Substance Use Topics   • Alcohol use: Yes     Alcohol/week: 3.0 standard drinks     Types: 3 Cans of beer per week     Comment: socially   • Drug use: Never     Family History   Problem Relation Age of Onset   • Diabetes Mother    • Hypertension Mother    • Colon polyps Father    • Stroke Father    • Hypertension Sister    • Rheum arthritis Sister    • Arthritis Sister    • Malig Hyperthermia Neg Hx      Health Maintenance Due   Topic Date Due   • ZOSTER VACCINE (1 of 2) Never done   • HEPATITIS C SCREENING  Never done   • COVID-19 Vaccine (2 - Booster for Jagdeep series) 2021      Immunization History   Administered Date(s) Administered   • COVID-19 (JAGDEEP) 2021   • TD Preservative Free 03/10/2017

## 2022-08-04 DIAGNOSIS — J30.1 SEASONAL ALLERGIC RHINITIS DUE TO POLLEN: ICD-10-CM

## 2022-08-04 DIAGNOSIS — J34.89 NASAL OBSTRUCTION: ICD-10-CM

## 2022-08-04 RX ORDER — FLUTICASONE PROPIONATE 50 MCG
2 SPRAY, SUSPENSION (ML) NASAL DAILY
Qty: 16 G | Refills: 3 | Status: SHIPPED | OUTPATIENT
Start: 2022-08-04

## 2022-08-16 ENCOUNTER — OFFICE VISIT (OUTPATIENT)
Dept: BEHAVIORAL HEALTH | Facility: CLINIC | Age: 57
End: 2022-08-16

## 2022-08-16 VITALS
BODY MASS INDEX: 43.55 KG/M2 | DIASTOLIC BLOOD PRESSURE: 80 MMHG | HEART RATE: 79 BPM | HEIGHT: 69 IN | WEIGHT: 294 LBS | OXYGEN SATURATION: 95 % | SYSTOLIC BLOOD PRESSURE: 130 MMHG

## 2022-08-16 DIAGNOSIS — G89.29 OTHER CHRONIC PAIN: ICD-10-CM

## 2022-08-16 DIAGNOSIS — F31.32 BIPOLAR AFFECTIVE DISORDER, CURRENTLY DEPRESSED, MODERATE: Primary | ICD-10-CM

## 2022-08-16 PROCEDURE — 90792 PSYCH DIAG EVAL W/MED SRVCS: CPT | Performed by: NURSE PRACTITIONER

## 2022-08-16 RX ORDER — LAMOTRIGINE 200 MG/1
200 TABLET ORAL EVERY MORNING
Qty: 90 TABLET | Refills: 2 | Status: SHIPPED | OUTPATIENT
Start: 2022-08-16 | End: 2023-05-13

## 2022-08-16 RX ORDER — CETIRIZINE HYDROCHLORIDE 5 MG/1
5 TABLET ORAL DAILY
COMMUNITY

## 2022-08-16 NOTE — PATIENT INSTRUCTIONS
1.  Please return to clinic at your next scheduled visit.  Contact the clinic ([phone]) at least 24 hours prior in the event you need to cancel.  2.  Do no harm to yourself or others.    3.  Avoid alcohol and drugs.    4.  Take all medications as prescribed.  Please contact the clinic with any concerns. If you are in need of medication refills, please call the clinic at [phone].    5. Should you want to get in touch with your provider, RAY Yates, please utilize [facility] or contact the office ([phone]), and staff will be able to page the provider on call directly.  6.  In the event you have personal crisis, contact the following crisis numbers: Suicide Prevention Hotline 1-928.181.7917; [facility] Helpline 0-487-054-QUXK; [facility] Emergency Room [phone]; text HELLO to 450628; or 459.  7.  Please feel free to contact my Medical Assistant, Shawna, directly at [phone], please leave a voice mail if you do not get an answer, and she will return your call within 24 hrs.      SPECIFIC RECOMMENDATIONS:     1.      Medications discussed at this encounter:                   - Refilled Lamictal today  Continue Cymbalta 20 mg by mouth every morning     2.      Psychotherapy recommendations:  Declined     3.     Return to clinic: 5 weeks    Please arrive at least 15 minutes before your scheduled appointment time to complete check in process.

## 2022-08-16 NOTE — PROGRESS NOTES
"Subjective   Anthony Brown is a 56 y.o. male who presents today for initial evaluation     Referring Provider:  Mercedes Armstrong, APRN  [address]  [address]  [place] [address]    Chief Complaint:  Bipolar depression     History of Present Illness:  Patient presents today in office with a history of bipolar depression, which patient began treatment initially in 2008 for depression, which later diagnosed with bipolar.  Patient has been taking Lamictal 200 mg every morning for approximately 12 yr.  Reports starting Cymbalta approximately 2 weeks for fibromyalgia by PCP.    Patient reports having a history of \"kaleigh\" related to medications as patient was being treated for depression.  After 1.5 to 2 yrs ex-wife had contacted psychiatrist before visit and changed diagnosis to Bipolar Depression, as patient was having mood fluctuations daily, higher during the morning and lower at night.  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 \"my bipolar hits normal and it was pushing me a little up, and I still have ups and downs 2-3 times per month, having a little dip, more consistent and closer to normal now.\"  Down days only last for one day.     Little dip described as lack of motivation, easy to shut down and decrease activity for that day, less talkative, gets quiet, \"I just have to crash on the couch usually. I am up and down, I am aware of my surroundings, just not as good as the day before.\"  \"I haven't had a sit in the dark day for 12 yrs, medication has done well since day 1.\"     Patient recalls prior psychiatrist had thought patient had only low days rarely, though patient was having more down low days than better days.     Patient reports weekend of 8/4/22 Thurs, Fri, and Sat wife had mentioned patient was more active than normal, pain was less, sleeping 5 hrs/night and normally 7-8 hrs/night, and " "patient thought the Cymbalta was working, and Sunday and Monday on the lower side of normal, patient uncertain if mood was good or hyper, \"I was up running around, I slept well just not very long.\"  On Sunday and Monday patient noted increased pain which effected mood, as patient recalls feeling depressed, \"the pain came back and I didn't have that motivation.\"  Patient reports on days mood is better as pain is not as active, though patient reports he is not surprised by the cycle of feeling good for few days then feeling down as pain varies throughout body.     Patient denies episodes or symptoms of true hypomania.     Patient voices desire to return to work, as patient is on short term disability for chronic pain and possible fibromyalgia.  Patient had missed 8-10 days of work in May and only worked 6 days in June.  Patient reports pain is worse upon awakening which eases off as day progresses, however, with activity pain worsens later in the day.  Patient also having trouble with diarrhea for the last several months, since Feb., and recalls having trouble walking to bathroom at employer due to distance.         PHQ-9 Depression Screening  PHQ-9 Total Score: 11    Little interest or pleasure in doing things? 2-->more than half the days   Feeling down, depressed, or hopeless? 1-->several days   Trouble falling or staying asleep, or sleeping too much? 1-->several days   Feeling tired or having little energy? 2-->more than half the days   Poor appetite or overeating? 3-->nearly every day   Feeling bad about yourself - or that you are a failure or have let yourself or your family down? 1-->several days   Trouble concentrating on things, such as reading the newspaper or watching television? 0-->not at all   Moving or speaking so slowly that other people could have noticed? Or the opposite - being so fidgety or restless that you have been moving around a lot more than usual? 1-->several days   Thoughts that you would be " better off dead, or of hurting yourself in some way? 0-->not at all   PHQ-9 Total Score 11     NAM-7  Feeling nervous, anxious or on edge: Several days  Not being able to stop or control worrying: Not at all  Worrying too much about different things: Not at all  Trouble Relaxing: Several days  Being so restless that it is hard to sit still: Several days  Feeling afraid as if something awful might happen: Not at all  Becoming easily annoyed or irritable: Several days  NAM 7 Total Score: 4  If you checked any problems, how difficult have these problems made it for you to do your work, take care of things at home, or get along with other people: Somewhat difficult    Past Surgical History:  Past Surgical History:   Procedure Laterality Date   • COLONOSCOPY  2019?   • HERNIA REPAIR     • JOINT REPLACEMENT  June 2017    Right knee   • KNEE SURGERY Right 1985   • TN TOTAL KNEE ARTHROPLASTY N/A 6/26/2017    Procedure: RIGHT TOTAL KNEE ARTHROPLASTY, LEFT KNEE INJECTED WITH CORTISONE;  Surgeon: Bradford Torres MD;  Location: [facility];  Service: Orthopedics   • TONSILLECTOMY  1970   • VASECTOMY  1998       Problem List:  Patient Active Problem List   Diagnosis   • Primary osteoarthritis of right knee   • Other chronic pain   • DDD (degenerative disc disease), lumbar   • Lumbar radiculopathy   • Testosterone deficiency   • Abreu's esophagus with dysplasia, unspecified   • Bipolar disorder (HCC)   • Depression   • High cholesterol   • Hypertension   • Low back pain   • Sleep apnea with use of continuous positive airway pressure (CPAP)   • Testicular hypofunction   • Class 3 severe obesity due to excess calories without serious comorbidity with body mass index (BMI) of 40.0 to 44.9 in adult (HCC)   • Diarrhea   • Vomiting without nausea       Allergy:   No Known Allergies     Discontinued Medications:  Medications Discontinued During This Encounter   Medication Reason   • HYDROcodone-acetaminophen (NORCO) 5-325 MG per  tablet *Therapy completed   • lamoTRIgine (LaMICtal) 200 MG tablet Reorder       Current Medications:   Current Outpatient Medications   Medication Sig Dispense Refill   • celecoxib (CeleBREX) 200 MG capsule Take 1 capsule by mouth Daily 90 capsule 3   • cetirizine (zyrTEC) 5 MG tablet Take 5 mg by mouth Daily.     • Cholecalciferol (VITAMIN D3) 5000 UNITS capsule capsule Take 2,000 Units by mouth Daily.     • colestipol (COLESTID) 1 g tablet Take 1 tablet by mouth 2 (Two) Times a Day With Meals. 60 tablet 3   • dicyclomine (Bentyl) 10 MG capsule Take 1 capsule by mouth 4 (Four) Times a Day Before Meals & at Bedtime As Needed (diarrhea/urgency). 90 capsule 0   • DULoxetine (Cymbalta) 20 MG capsule Take 1 capsule by mouth Daily. 30 capsule 1   • fenofibrate 160 MG tablet Take 1 tablet by mouth Daily. 90 tablet 0   • fluticasone (FLONASE) 50 MCG/ACT nasal spray Use 2 sprays into the nostril(s) as directed by provider Daily. 16 g 3   • lamoTRIgine (LaMICtal) 200 MG tablet Take 1 tablet by mouth Every Morning. 90 tablet 2   • lisinopril (PRINIVIL,ZESTRIL) 20 MG tablet Take 1 tablet by mouth daily 90 tablet 0   • multivitamin with minerals tablet tablet Take 1 tablet by mouth Daily.     • omeprazole (priLOSEC) 20 MG capsule Take 1 capsule by mouth Daily. 90 capsule 0   • Probiotic Product (PROBIOTIC PO) Take 1 capsule by mouth Daily. PT TO HOLD MED PRIOR TO OR     • Turmeric 500 MG capsule Take  by mouth.     • vitamin B-12 (CYANOCOBALAMIN) 1000 MCG tablet Take 5,000 mcg by mouth Daily.       No current facility-administered medications for this visit.       Past Medical History:  Past Medical History:   Diagnosis Date   • Acute URI    • Arthritis     bialteral knees   • Abreu's esophagus with dysplasia, unspecified    • Bipolar disorder (HCC)    • Chronic pain disorder    • Colon polyp 2019?   • Contact with and (suspected) exposure to other viral communicable diseases    • Depression    • Deviated nasal septum    •  "GERD (gastroesophageal reflux disease)    • High cholesterol    • History of chest pain     6 YEARS AGO, HAD STRESS TEST NORMAL   • Hypertension    • Low back pain    • Low testosterone level in male     HAS TESTOSTERONE PELLETS IMPLANTED   • Obesity    • Pain in joint     UNSPECIFIED   • Sinusitis    • Sleep apnea with use of continuous positive airway pressure (CPAP)    • Sleep apnea, unspecified    • SOB (shortness of breath)    • Testicular hypofunction    • Testosterone deficiency        Past Psychiatric History:  Began Treatment:  Diagnoses:Depression and Bipolar Depression  Psychiatrist:2 visits around   Therapist:last seen approximately 8 yr ago-helpful at the time  Admission History:Denies  Medication Trials:Uncertain, \"tried 3-4 meds for a few months that didn't have much effect.\" prior to   Self Harm: Denies  Suicide Attempts:Denies      Substance Abuse History:   Types:Denies all, including illicit  Withdrawal Symptoms:Denies  Longest Period Sober:Not Applicable   AA: Not applicable     Social History:  Martial Status:  Employed:Yes and If so, where partially disabled due to chronic pain-feet,joints,hands, possible fibromyalgia, currently on ST disability- 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, packages protein powder first shift, full time  Kids:Yes or If so, how many 2  House:Lives in a house   History: Denies  Access to Guns:  Yes, \"most are put away\"    Social History     Socioeconomic History   • Marital status:    • Number of children: 2   Tobacco Use   • Smoking status: Former Smoker     Packs/day: 0.25     Years: 3.00     Pack years: 0.75     Types: Cigarettes, Pipe, Cigars, Cigarettes, Pipe, Cigars     Quit date: 1984     Years since quittin.9   • Smokeless tobacco: Never Used   • Tobacco comment: smoked as a teenager   Vaping Use   • Vaping Use: Never used   Substance and Sexual Activity   • Alcohol use: Yes     Alcohol/week: 3.0 standard " drinks     Types: 3 Cans of beer per week     Comment: socially   • Drug use: Not Currently     Types: Marijuana     Comment: in college   • Sexual activity: Yes     Partners: Female     Birth control/protection: Post-menopausal, Surgical       Family History:   Suicide Attempts: Denies  Suicide Completions:Denies      Family History   Problem Relation Age of Onset   • Diabetes Mother    • Hypertension Mother    • Depression Father    • Colon polyps Father    • Stroke Father    • Depression Sister    • Hypertension Sister    • Rheum arthritis Sister    • Arthritis Sister    • ADD / ADHD Nephew    • Malig Hyperthermia Neg Hx        Developmental History:   Born: [place]  Siblings:1 sister  Childhood: Denies Abuse  High School:Completed  College:some    Mental Status Exam:   Hygiene:   good  Cooperation:  Cooperative  Eye Contact:  Good  Psychomotor Behavior:  Appropriate  Affect:  Appropriate  Mood: depressed  Speech:  Normal  Thought Process:  Goal directed  Thought Content:  Mood congruent  Suicidal:  None  Homicidal:  None  Hallucinations:  None  Delusion:  None  Memory:  Intact  Orientation:  Person, Place, Time and Situation  Reliability:  good  Insight:  Good  Judgement:  Good  Impulse Control:  Good  Physical/Medical Issues:  Yes HTN,HLD,Obesity,Barretts esophaagus,LBP,OA,Lumbar DDD,JEANNIE-CPAP,Chronic leg pain, paresthesia dorothy feet, testoterone def.     Review of Systems:  Review of Systems   Constitutional: Positive for fatigue. Negative for diaphoresis.   HENT: Negative for drooling.    Eyes: Negative for visual disturbance.   Respiratory: Positive for chest tightness. Negative for cough and shortness of breath.    Cardiovascular: Negative for chest pain, palpitations and leg swelling.   Gastrointestinal: Negative for nausea and vomiting.   Endocrine: Negative for cold intolerance and heat intolerance.   Genitourinary: Negative for difficulty urinating.   Musculoskeletal: Positive for back pain and  "joint swelling.   Allergic/Immunologic: Negative for immunocompromised state.   Neurological: Negative for dizziness, seizures, speech difficulty and numbness.   Psychiatric/Behavioral: Positive for decreased concentration. Negative for hallucinations, self-injury, sleep disturbance and suicidal ideas. The patient is not nervous/anxious and is not hyperactive.          Physical Exam:  Physical Exam  Psychiatric:         Attention and Perception: Attention and perception normal.         Mood and Affect: Affect normal. Mood is depressed.         Speech: Speech normal.         Behavior: Behavior normal. Behavior is cooperative.         Thought Content: Thought content normal. Thought content does not include suicidal ideation. Thought content does not include suicidal plan.         Cognition and Memory: Cognition and memory normal.         Judgment: Judgment normal.         Vital Signs:   /80   Pulse 79   Ht 175.3 cm (69.02\")   Wt 133 kg (294 lb)   SpO2 95%   BMI 43.39 kg/m²      Lab Results:   Lab on 06/24/2022   Component Date Value Ref Range Status   • Class Description 06/24/2022 Comment   Final        Levels of Specific IgE       Class  Description of Class      ---------------------------  -----  --------------------                     < 0.10         0         Negative             0.10 -    0.31         0/I       Equivocal/Low             0.32 -    0.55         I         Low             0.56 -    1.40         II        Moderate             1.41 -    3.90         III       High             3.91 -   19.00         IV        Very High            19.01 -  100.00         V         Very High                    >100.00         VI        Very High   • Egg White 06/24/2022 <0.10  Class 0 kU/L Final   • Peanut 06/24/2022 0.15 (A) Class 0/I kU/L Final   • Soybean 06/24/2022 0.11 (A) Class 0/I kU/L Final   • Milk, Cow's 06/24/2022 0.76 (A) Class II kU/L Final   • Clams 06/24/2022 0.38 (A) Class I kU/L Final   • " Shrimp 06/24/2022 0.21 (A) Class 0/I kU/L Final   • Coulters 06/24/2022 <0.10  Class 0 kU/L Final   • CodFish 06/24/2022 <0.10  Class 0 kU/L Final   • Scallop 06/24/2022 0.45 (A) Class I kU/L Final   • Wheat 06/24/2022 0.14 (A) Class 0/I kU/L Final   • Corn 06/24/2022 0.24 (A) Class 0/I kU/L Final   • Sesame Seed 06/24/2022 0.28 (A) Class 0/I kU/L Final   Lab on 05/18/2022   Component Date Value Ref Range Status   • Free T4 05/18/2022 1.19  0.93 - 1.70 ng/dL Final   • Sed Rate 05/18/2022 9  0 - 20 mm/hr Final   • TSH 05/18/2022 1.810  0.270 - 4.200 uIU/mL Final   Lab on 04/15/2022   Component Date Value Ref Range Status   • Sed Rate 04/15/2022 9  0 - 20 mm/hr Final   Lab on 03/02/2022   Component Date Value Ref Range Status   • Glucose 03/02/2022 120 (A) 65 - 99 mg/dL Final   • BUN 03/02/2022 14  6 - 20 mg/dL Final   • Creatinine 03/02/2022 1.01  0.76 - 1.27 mg/dL Final   • Sodium 03/02/2022 141  136 - 145 mmol/L Final   • Potassium 03/02/2022 4.3  3.5 - 5.2 mmol/L Final    Slight hemolysis detected by analyzer. Results may be affected.   • Chloride 03/02/2022 106  98 - 107 mmol/L Final   • CO2 03/02/2022 20.9 (A) 22.0 - 29.0 mmol/L Final   • Calcium 03/02/2022 10.0  8.6 - 10.5 mg/dL Final   • Total Protein 03/02/2022 7.3  6.0 - 8.5 g/dL Final   • Albumin 03/02/2022 4.30  3.50 - 5.20 g/dL Final   • ALT (SGPT) 03/02/2022 54 (A) 1 - 41 U/L Final   • AST (SGOT) 03/02/2022 27  1 - 40 U/L Final   • Alkaline Phosphatase 03/02/2022 87  39 - 117 U/L Final   • Total Bilirubin 03/02/2022 0.3  0.0 - 1.2 mg/dL Final   • Globulin 03/02/2022 3.0  gm/dL Final   • A/G Ratio 03/02/2022 1.4  g/dL Final   • BUN/Creatinine Ratio 03/02/2022 13.9  7.0 - 25.0 Final   • Anion Gap 03/02/2022 14.1  5.0 - 15.0 mmol/L Final   • eGFR 03/02/2022 87.8  >60.0 mL/min/1.73 Final    National Kidney Foundation and American Society of Nephrology (ASN) Task Force recommended calculation based on the Chronic Kidney Disease Epidemiology Collaboration  (CKD-EPI) equation refit without adjustment for race.   • WBC 03/02/2022 6.44  3.40 - 10.80 10*3/mm3 Final   • RBC 03/02/2022 5.32  4.14 - 5.80 10*6/mm3 Final   • Hemoglobin 03/02/2022 15.9  13.0 - 17.7 g/dL Final   • Hematocrit 03/02/2022 46.2  37.5 - 51.0 % Final   • MCV 03/02/2022 86.8  79.0 - 97.0 fL Final   • MCH 03/02/2022 29.9  26.6 - 33.0 pg Final   • MCHC 03/02/2022 34.4  31.5 - 35.7 g/dL Final   • RDW 03/02/2022 13.1  12.3 - 15.4 % Final   • RDW-SD 03/02/2022 42.1  37.0 - 54.0 fl Final   • MPV 03/02/2022 9.5  6.0 - 12.0 fL Final   • Platelets 03/02/2022 270  140 - 450 10*3/mm3 Final   • Neutrophil % 03/02/2022 51.2  42.7 - 76.0 % Final   • Lymphocyte % 03/02/2022 28.6  19.6 - 45.3 % Final   • Monocyte % 03/02/2022 8.1  5.0 - 12.0 % Final   • Eosinophil % 03/02/2022 10.7 (A) 0.3 - 6.2 % Final   • Basophil % 03/02/2022 0.5  0.0 - 1.5 % Final   • Immature Grans % 03/02/2022 0.9 (A) 0.0 - 0.5 % Final   • Neutrophils, Absolute 03/02/2022 3.30  1.70 - 7.00 10*3/mm3 Final   • Lymphocytes, Absolute 03/02/2022 1.84  0.70 - 3.10 10*3/mm3 Final   • Monocytes, Absolute 03/02/2022 0.52  0.10 - 0.90 10*3/mm3 Final   • Eosinophils, Absolute 03/02/2022 0.69 (A) 0.00 - 0.40 10*3/mm3 Final   • Basophils, Absolute 03/02/2022 0.03  0.00 - 0.20 10*3/mm3 Final   • Immature Grans, Absolute 03/02/2022 0.06 (A) 0.00 - 0.05 10*3/mm3 Final   • Salmonella 03/02/2022 Not Detected  Not Detected Final   • Campylobacter 03/02/2022 Not Detected  Not Detected Final   • Shigella/Enteroinvasive E. coli (E* 03/02/2022 Not Detected  Not Detected Final   • Shiga-like toxin-producing E. coli* 03/02/2022 Not Detected  Not Detected Final   • C. Difficile Toxins by PCR 03/02/2022 Negative  Negative Final   • 027 Toxin 03/02/2022 Presumptive Negative   Final       EKG Results:  No orders to display       Imaging Results:  No Images in the past 120 days found..      Assessment & Plan   Diagnoses and all orders for this visit:    1. Bipolar affective  disorder, currently depressed, moderate (HCC) (Primary)  -     lamoTRIgine (LaMICtal) 200 MG tablet; Take 1 tablet by mouth Every Morning.  Dispense: 90 tablet; Refill: 2    2. Other chronic pain        Visit Diagnoses:    ICD-10-CM ICD-9-CM   1. Bipolar affective disorder, currently depressed, moderate (HCC)  F31.32 296.52   2. Other chronic pain  G89.29 338.29       PLAN:  1. Safety: No acute safety concerns  2. Therapy: Declines  3. Risk Assessment: Risk of self-harm acutely is low.  Risk factors include mood disorder, access to guns/weapons, and recent psychosocial stressors (pandemic). Protective factors include no family history, no present SI, no history of suicide attempts or self-harm in the past, minimal AODA, healthcare seeking, future orientation, willingness to engage in care.  Risk of self-harm chronically is also low, but could be further elevated in the event of treatment noncompliance and/or AODA.  4. Meds: Continue Lamictal 200 mg by mouth daily in the morning to target mood. Risks, benefits, alternatives discussed with patient including rash, rebound depressive or manic symptoms if prompt discontinuation, GI upset, agitation, sedation/falls risk.  After discussion of these risks and benefits, patient voiced understanding and agreed to proceed. Refilled today  Continue Cymbalta 20 mg by mouth daily in the morning to target mood and chronic pain as previously started by PCP on 8/3/22.  Discussed all risks, benefits, alternatives, and side effects of Duloxetine including but not limited to GI upset, sexual dysfunction, bleeding risk, seizure risk, weight loss, insomnia, diaphoresis, drowsiness, headache, dizziness, fatigue, activation of kaleigh or hypomania, increased fragility fracture risk, hyponatremia, increased BP, hepatotoxicity, ocular effects, withdrawal syndrome following abrupt discontinuation, serotonin syndrome, and activation of suicidal ideation and behavior. Patient educated on the need  to practice safe sex while taking this medication. Discussed the need for patient to immediately call the office for any new or worsening symptoms, such as worsening depression; feeling nervous or restless; suicidal thoughts or actions; or other changes in mood or behavior, and all other concerns. Patient educated on medication compliance and the risks of suddenly stopping this medication or missing doses. Patient verbalized understanding and is agreeable to taking Duloxetine. Addressed all questions and concerns.   5. Labs: n/a    Patient presentation seems most consistent with bipolar depression with mood fluctuations appearing to be more related to physical pain.  As patient reports having 2-3 days per month of feeling low which last for 1 day.  Will continue current regimen as patient has been taking Lamictal 200 mg for 12 yrs which has been effective.    Patient to contact provider if symptoms worsen or fail to improve.        Patient screened positive for depression based on a PHQ-9 score of 11 on 8/16/2022. Follow-up recommendations include: Prescribed antidepressant medication treatment and Suicide Risk Assessment performed.           TREATMENT PLAN/GOALS: Continue supportive psychotherapy efforts and medications as indicated. Treatment and medication options discussed during today's visit. Patient acknowledged and verbally consented to continue with current treatment plan and was educated on the importance of compliance with treatment and follow-up appointments.    MEDICATION ISSUES:  DEANDRE reviewed as expected.  Discussed medication options and treatment plan of prescribed medication as well as the risks, benefits, and side effects including potential falls, possible impaired driving and metabolic adversities among others. Patient is agreeable to call the office with any worsening of symptoms or onset of side effects. Patient is agreeable to call 911 or go to the nearest ER should he/she begin having  SI/HI. No medication side effects or related complaints today.     MEDS ORDERED DURING VISIT:  New Medications Ordered This Visit   Medications   • lamoTRIgine (LaMICtal) 200 MG tablet     Sig: Take 1 tablet by mouth Every Morning.     Dispense:  90 tablet     Refill:  2       Return in about 5 weeks (around 9/20/2022) for Next scheduled follow up.         I spent 47 minutes caring for Anthony on this date of service. This time includes time spent by me in the following activities: preparing for the visit, reviewing tests, obtaining and/or reviewing a separately obtained history, performing a medically appropriate examination and/or evaluation, counseling and educating the patient/family/caregiver, ordering medications, tests, or procedures, referring and communicating with other health care professionals and documenting information in the medical record.      This document has been electronically signed by RAY Yates  August 16, 2022 09:48 EDT      Part of this note may be an electronic transcription/translation of spoken language to printed text using the Dragon Dictation System.

## 2022-08-26 DIAGNOSIS — M79.671 BILATERAL FOOT PAIN: ICD-10-CM

## 2022-08-26 DIAGNOSIS — M79.672 BILATERAL FOOT PAIN: ICD-10-CM

## 2022-08-26 DIAGNOSIS — R20.2 PARESTHESIA OF BOTH FEET: Primary | ICD-10-CM

## 2022-08-31 ENCOUNTER — OFFICE VISIT (OUTPATIENT)
Dept: FAMILY MEDICINE CLINIC | Age: 57
End: 2022-08-31

## 2022-08-31 ENCOUNTER — HOSPITAL ENCOUNTER (OUTPATIENT)
Dept: GENERAL RADIOLOGY | Facility: HOSPITAL | Age: 57
Discharge: HOME | End: 2022-08-31
Admitting: NURSE PRACTITIONER

## 2022-08-31 VITALS
TEMPERATURE: 98.5 F | DIASTOLIC BLOOD PRESSURE: 77 MMHG | SYSTOLIC BLOOD PRESSURE: 133 MMHG | HEART RATE: 74 BPM | HEIGHT: 69 IN | WEIGHT: 295.4 LBS | BODY MASS INDEX: 43.75 KG/M2

## 2022-08-31 DIAGNOSIS — M19.071 OSTEOARTHRITIS OF BOTH FEET, UNSPECIFIED OSTEOARTHRITIS TYPE: ICD-10-CM

## 2022-08-31 DIAGNOSIS — M79.671 BILATERAL FOOT PAIN: Primary | ICD-10-CM

## 2022-08-31 DIAGNOSIS — M79.671 BILATERAL FOOT PAIN: ICD-10-CM

## 2022-08-31 DIAGNOSIS — M79.672 BILATERAL FOOT PAIN: ICD-10-CM

## 2022-08-31 DIAGNOSIS — M79.672 BILATERAL FOOT PAIN: Primary | ICD-10-CM

## 2022-08-31 DIAGNOSIS — M19.072 OSTEOARTHRITIS OF BOTH FEET, UNSPECIFIED OSTEOARTHRITIS TYPE: ICD-10-CM

## 2022-08-31 PROCEDURE — 73630 X-RAY EXAM OF FOOT: CPT

## 2022-08-31 PROCEDURE — 99213 OFFICE O/P EST LOW 20 MIN: CPT | Performed by: NURSE PRACTITIONER

## 2022-09-19 DIAGNOSIS — E78.00 HIGH CHOLESTEROL: ICD-10-CM

## 2022-09-19 DIAGNOSIS — K22.719 BARRETT'S ESOPHAGUS WITH DYSPLASIA, UNSPECIFIED: ICD-10-CM

## 2022-09-20 RX ORDER — LISINOPRIL 20 MG/1
20 TABLET ORAL DAILY
Qty: 90 TABLET | Refills: 0 | Status: SHIPPED | OUTPATIENT
Start: 2022-09-20 | End: 2022-10-17 | Stop reason: SDUPTHER

## 2022-09-20 RX ORDER — OMEPRAZOLE 20 MG/1
20 CAPSULE, DELAYED RELEASE ORAL DAILY
Qty: 90 CAPSULE | Refills: 0 | Status: SHIPPED | OUTPATIENT
Start: 2022-09-20 | End: 2022-10-17 | Stop reason: SDUPTHER

## 2022-09-20 RX ORDER — FENOFIBRATE 160 MG/1
160 TABLET ORAL DAILY
Qty: 90 TABLET | Refills: 0 | Status: SHIPPED | OUTPATIENT
Start: 2022-09-20 | End: 2022-10-17 | Stop reason: SDUPTHER

## 2022-09-23 DIAGNOSIS — G89.29 OTHER CHRONIC PAIN: ICD-10-CM

## 2022-09-23 RX ORDER — DULOXETIN HYDROCHLORIDE 20 MG/1
20 CAPSULE, DELAYED RELEASE ORAL DAILY
Qty: 30 CAPSULE | Refills: 0 | Status: SHIPPED | OUTPATIENT
Start: 2022-09-23 | End: 2022-10-18 | Stop reason: SDUPTHER

## 2022-10-06 ENCOUNTER — OFFICE VISIT (OUTPATIENT)
Dept: BEHAVIORAL HEALTH | Facility: CLINIC | Age: 57
End: 2022-10-06

## 2022-10-06 VITALS
WEIGHT: 291.6 LBS | BODY MASS INDEX: 43.19 KG/M2 | HEIGHT: 69 IN | HEART RATE: 70 BPM | SYSTOLIC BLOOD PRESSURE: 138 MMHG | DIASTOLIC BLOOD PRESSURE: 84 MMHG

## 2022-10-06 DIAGNOSIS — F31.31 BIPOLAR AFFECTIVE DISORDER, CURRENTLY DEPRESSED, MILD: Primary | ICD-10-CM

## 2022-10-06 PROCEDURE — 99212 OFFICE O/P EST SF 10 MIN: CPT | Performed by: NURSE PRACTITIONER

## 2022-10-06 NOTE — PATIENT INSTRUCTIONS
"1.  Please return to clinic at your next scheduled visit.  Contact the [facility] ([phone]) or **Shawna, Medical Assistant at [place] Office directly at [phone] at least 24 hours prior in the event you need to cancel.**    2. Should you want to get in touch with your provider, RAY Yates, please contact MY Medical Assistant, Shawna, directly at [phone].  Recommend saving Shawna's direct number in phone as this is the PREFERRED & EASIEST way to get in contact with your provider.  Please leave a voice mail if you do not get an answer and she will return your call within 24 hrs. You will NOT be able to contact provider on [facility], as Behavioral Health Providers are restricted. YOU MUST CALL [phone]    If you need to speak with the on call provider after hours or on weekends, please Contact the [facility] ([phone]) and staff will be able to page the provider on call directly.        3, MEDICATION REFILLS:  PLEASE CALL THE PHARMACY TO REQUEST ALL MEDICATION REFILLS TO ENSURE YOU ARE RECEIVING YOUR MEDICATIONS IN A TIMELY MANNER.    IF YOU USE AN AUTOMATED SERVICE AT THE PHARMACY FOR REFILLS AND ARE TOLD THERE ARE \"NO REFILLS REMAINING\"   PLEASE CALL THE PHARMACY & SPEAK TO A LIVE PERSON TO VERIFY IT IS THE MOST UP TO DATE PRESCRIPTION ON FILE.    All new prescriptions will have a different number, therefore, if you were given refills for a medication today or at last visit it will not have the same number as the previous prescription.       4.  In the event you have personal crisis, contact the following crisis numbers: Suicide Prevention Hotline 1-847.664.8438 or *988, [facility]EL Helpline 9-938-476-SAMUEL; [facility] Emergency Room [phone]; text HELLO to 335689; or 725.      5. We would appreciate your feedback, please scan the QRS code on the back of your appointment card (or see below) and complete a brief survey.  [place] location is still not available, so " "please click \"[place]\" location.  Thank you      SPECIFIC RECOMMENDATIONS:     1.      Medications discussed at this encounter:                   - no changes  Please call if your mood worsens or fails to improve.      2.      Psychotherapy recommendations: Declined     3.     Return to clinic: 8 weeks    Please arrive at least 15 minutes before your scheduled appointment time to complete check in process.      IF you are scheduled for a [facility] V[facility] visit, PLEASE ANSWER YOUR PHONE WHEN OFFICE CALLS PRIOR TO VISIT TO COMPLETE THE CHECK IN PROCESS, EVEN IF THE E-CHECK IN WAS COMPLETED.     If you would like to log on to [facility] and complete the \"E-Check IN\" prior to your visit, please do so, this will speed up the check in process.  If you are due for questionnaires, you will find those on [facility] as well, please try to complete prior to your scheduled appointment.          "

## 2022-10-06 NOTE — PROGRESS NOTES
"Answers for HPI/ROS submitted by the patient on 9/29/2022  What is the primary reason for your visit?: Other  Please describe your symptoms.: Fibromyalgia symptoms? Mood  Have you had these symptoms before?: Yes  How long have you been having these symptoms?: Greater than 2 weeks  Please list any medications you are currently taking for this condition.: Lamictal, Cymbalta  Please describe any probable cause for these symptoms. : ?    Subjective   Anthony Brown is a 57 y.o. male who presents today for follow up    Referring Provider:  Mercedes Armstrong, APRN  [address] [facility]  [address]  [place] [address]    Chief Complaint:  Medication check     History of Present Illness:    9/20/22:  Patient presents today in office, \"well my wife says I am better. I can see the mood is up a little bit, a couple days I felt manic, but it could be that I was normal and not used to it.\"    Patient feels Cymbalta has been helpful with fibromyalgia symptoms.    Patient had to put his dog down yesterday, which patient has been feeling down.      Reports 3-4 days of having more energy and inability to sit still, 2 in a row and a couple of randoms \"I feel like I had a few days of being testy than others, not angry, my previous counselor had taught me how to recognize anger feelings and able to calm down.\"  \"the day after I was exhausted because pain would kick in because I did too much physically.\"   \"Not severe as wife did not notice, I just felt like I needed to be more careful that day\" in order to avoid increased irritability or anger.  Patient reported prior behavior was up and down and now mood is more \"normalized\".    Other than mentioned symptoms, patient feels mood is stable, denies feelings of depression or anxiety.     Reports symptoms of: increase in goal-directed activity or psychomotor agitation  Denies: inflated self-esteem or grandiosity, decreased need for sleep, increased talkativeness, flight of ideas or racing " "thoughts, distractibility and increase in risky behavior          8/16/22:  INITIAL EVAL  Patient presents today in office with a history of bipolar depression, which patient began treatment initially in 2008 for depression, which later diagnosed with bipolar.  Patient has been taking Lamictal 200 mg every morning for approximately 12 yr.  Reports starting Cymbalta approximately 2 weeks for fibromyalgia by PCP.    Patient reports having a history of \"kaleigh\" related to medications as patient was being treated for depression.  After 1.5 to 2 yrs ex-wife had contacted psychiatrist before visit and changed diagnosis to Bipolar Depression, as patient was having mood fluctuations daily, higher during the morning and lower at night.  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 \"my bipolar hits normal and it was pushing me a little up, and I still have ups and downs 2-3 times per month, having a little dip, more consistent and closer to normal now.\"  Down days only last for one day.     Little dip described as lack of motivation, easy to shut down and decrease activity for that day, less talkative, gets quiet, \"I just have to crash on the couch usually. I am up and down, I am aware of my surroundings, just not as good as the day before.\"  \"I haven't had a sit in the dark day for 12 yrs, medication has done well since day 1.\"     Patient recalls prior psychiatrist had thought patient had only low days rarely, though patient was having more down low days than better days.     Patient reports weekend of 8/4/22 Thurs, Fri, and Sat wife had mentioned patient was more active than normal, pain was less, sleeping 5 hrs/night and normally 7-8 hrs/night, and patient thought the Cymbalta was working, and Sunday and Monday on the lower side of normal, patient uncertain if mood was good or hyper, \"I was up running around, I slept well " "just not very long.\"  On Sunday and Monday patient noted increased pain which effected mood, as patient recalls feeling depressed, \"the pain came back and I didn't have that motivation.\"  Patient reports on days mood is better as pain is not as active, though patient reports he is not surprised by the cycle of feeling good for few days then feeling down as pain varies throughout body.     Patient denies episodes or symptoms of true hypomania.     Patient voices desire to return to work, as patient is on short term disability for chronic pain and possible fibromyalgia.  Patient had missed 8-10 days of work in May and only worked 6 days in June.  Patient reports pain is worse upon awakening which eases off as day progresses, however, with activity pain worsens later in the day.  Patient also having trouble with diarrhea for the last several months, since Feb., and recalls having trouble walking to bathroom at employer due to distance.         PHQ-9 Depression Screening  PHQ-9 Total Score:   8/16/2022 11 , reassess 12/2022    Little interest or pleasure in doing things?     Feeling down, depressed, or hopeless?     Trouble falling or staying asleep, or sleeping too much?     Feeling tired or having little energy?     Poor appetite or overeating?     Feeling bad about yourself - or that you are a failure or have let yourself or your family down?     Trouble concentrating on things, such as reading the newspaper or watching television?     Moving or speaking so slowly that other people could have noticed? Or the opposite - being so fidgety or restless that you have been moving around a lot more than usual?     Thoughts that you would be better off dead, or of hurting yourself in some way?     PHQ-9 Total Score       NAM-7    8/16/22 4, reassess 12/2022    Past Surgical History:  Past Surgical History:   Procedure Laterality Date   • COLONOSCOPY  2019?   • HERNIA REPAIR     • JOINT REPLACEMENT  June 2017    Right knee   • " KNEE SURGERY Right 1985   • FL TOTAL KNEE ARTHROPLASTY N/A 6/26/2017    Procedure: RIGHT TOTAL KNEE ARTHROPLASTY, LEFT KNEE INJECTED WITH CORTISONE;  Surgeon: Bradford Torres MD;  Location: [facility];  Service: Orthopedics   • TONSILLECTOMY  1970   • VASECTOMY  1998       Problem List:  Patient Active Problem List   Diagnosis   • Primary osteoarthritis of right knee   • Other chronic pain   • DDD (degenerative disc disease), lumbar   • Lumbar radiculopathy   • Testosterone deficiency   • Abreu's esophagus with dysplasia, unspecified   • Bipolar disorder (HCC)   • Depression   • High cholesterol   • Hypertension   • Low back pain   • Sleep apnea with use of continuous positive airway pressure (CPAP)   • Testicular hypofunction   • Class 3 severe obesity due to excess calories without serious comorbidity with body mass index (BMI) of 40.0 to 44.9 in adult (HCC)   • Diarrhea   • Vomiting without nausea       Allergy:   No Known Allergies     Discontinued Medications:  There are no discontinued medications.    Current Medications:   Current Outpatient Medications   Medication Sig Dispense Refill   • celecoxib (CeleBREX) 200 MG capsule Take 1 capsule by mouth Daily 90 capsule 3   • cetirizine (zyrTEC) 5 MG tablet Take 5 mg by mouth Daily.     • Cholecalciferol (VITAMIN D3) 5000 UNITS capsule capsule Take 2,000 Units by mouth Daily.     • colestipol (COLESTID) 1 g tablet Take 1 tablet by mouth 2 (Two) Times a Day With Meals. 60 tablet 3   • dicyclomine (Bentyl) 10 MG capsule Take 1 capsule by mouth 4 (Four) Times a Day Before Meals & at Bedtime As Needed (diarrhea/urgency). 90 capsule 0   • DULoxetine (Cymbalta) 20 MG capsule Take 1 capsule by mouth Daily. 30 capsule 0   • fenofibrate 160 MG tablet Take 1 tablet by mouth Daily. 90 tablet 0   • fluticasone (FLONASE) 50 MCG/ACT nasal spray Use 2 sprays into the nostril(s) as directed by provider Daily. 16 g 3   • lamoTRIgine (LaMICtal) 200 MG tablet Take 1 tablet by  "mouth Every Morning. 90 tablet 2   • lisinopril (PRINIVIL,ZESTRIL) 20 MG tablet Take 1 tablet by mouth Daily. 90 tablet 0   • multivitamin with minerals tablet tablet Take 1 tablet by mouth Daily.     • omeprazole (priLOSEC) 20 MG capsule Take 1 capsule by mouth Daily. 90 capsule 0   • Probiotic Product (PROBIOTIC PO) Take 1 capsule by mouth Daily. PT TO HOLD MED PRIOR TO OR     • Turmeric 500 MG capsule Take  by mouth.     • vitamin B-12 (CYANOCOBALAMIN) 1000 MCG tablet Take 5,000 mcg by mouth Daily.       No current facility-administered medications for this visit.       Past Medical History:  Past Medical History:   Diagnosis Date   • Acute URI    • Arthritis     bialteral knees   • Abreu's esophagus with dysplasia, unspecified    • Bipolar disorder (HCC)    • Chronic pain disorder    • Colon polyp 2019?   • Contact with and (suspected) exposure to other viral communicable diseases    • Depression    • Deviated nasal septum    • GERD (gastroesophageal reflux disease) 2008   • High cholesterol    • History of chest pain     6 YEARS AGO, HAD STRESS TEST NORMAL   • Hypertension    • Low back pain    • Low testosterone level in male     HAS TESTOSTERONE PELLETS IMPLANTED   • Obesity    • Pain in joint     UNSPECIFIED   • Sinusitis    • Sleep apnea with use of continuous positive airway pressure (CPAP)    • Sleep apnea, unspecified    • SOB (shortness of breath)    • Testicular hypofunction    • Testosterone deficiency        Past Psychiatric History:  Began Treatment:2008  Diagnoses:Depression and Bipolar Depression  Psychiatrist:2 visits around 2010  Therapist:last seen approximately 8 yr ago-helpful at the time  Admission History:Denies  Medication Trials:Uncertain, \"tried 3-4 meds for a few months that didn't have much effect.\" prior to 2010  Self Harm: Denies  Suicide Attempts:Denies      Substance Abuse History:   Types:Denies all, including illicit  Withdrawal Symptoms:Denies  Longest Period Sober:Not " "Applicable   AA: Not applicable     Social History:  Martial Status:  Employed:Yes and If so, where partially disabled due to chronic pain-feet,joints,hands, possible fibromyalgia, currently on ST disability- 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, packages protein powder first shift, full time  Kids:Yes or If so, how many 2  House:Lives in a house   History: Denies  Access to Guns:  Yes, \"most are put away\"    Social History     Socioeconomic History   • Marital status:    • Number of children: 2   Tobacco Use   • Smoking status: Former Smoker     Packs/day: 0.25     Years: 3.00     Pack years: 0.75     Types: Cigarettes, Pipe, Cigars, Cigarettes, Pipe, Cigars     Quit date: 1984     Years since quittin.1   • Smokeless tobacco: Never Used   • Tobacco comment: smoked as a teenager   Vaping Use   • Vaping Use: Never used   Substance and Sexual Activity   • Alcohol use: Yes     Alcohol/week: 3.0 standard drinks     Types: 3 Cans of beer per week     Comment: socially   • Drug use: Not Currently     Types: Marijuana     Comment: in college   • Sexual activity: Yes     Partners: Female     Birth control/protection: Post-menopausal, Surgical       Family History:   Suicide Attempts: Denies  Suicide Completions:Denies      Family History   Problem Relation Age of Onset   • Diabetes Mother    • Hypertension Mother    • Depression Father    • Colon polyps Father    • Stroke Father    • Depression Sister    • Hypertension Sister    • Rheum arthritis Sister    • Arthritis Sister    • ADD / ADHD Nephew    • Malig Hyperthermia Neg Hx        Developmental History:   Born: [place]  Siblings:1 sister  Childhood: Denies Abuse  High School:Completed  College:some    Mental Status Exam:   Hygiene:   good  Cooperation:  Cooperative  Eye Contact:  Good  Psychomotor Behavior:  Appropriate  Affect:  Appropriate  Mood: euthymic  Speech:  Normal  Thought Process:  Goal directed  Thought Content:  " Mood congruent  Suicidal:  None  Homicidal:  None  Hallucinations:  None  Delusion:  None  Memory:  Intact  Orientation:  Person, Place, Time and Situation  Reliability:  good  Insight:  Good  Judgement:  Good  Impulse Control:  Good  Physical/Medical Issues:  Yes HTN,HLD,Obesity,Barretts esophaagus,LBP,OA,Lumbar DDD,JEANNIE-CPAP,Chronic leg pain, paresthesia dorothy feet, testoterone def.     Review of Systems:  Review of Systems   Constitutional: Negative for diaphoresis, fatigue and fever.   HENT: Negative for drooling.    Eyes: Negative for visual disturbance.   Respiratory: Negative for cough, chest tightness and shortness of breath.    Cardiovascular: Negative for chest pain, palpitations and leg swelling.   Gastrointestinal: Negative for abdominal pain, nausea and vomiting.   Endocrine: Negative for cold intolerance and heat intolerance.   Genitourinary: Negative for difficulty urinating.   Musculoskeletal: Positive for joint swelling. Negative for back pain and neck pain.   Allergic/Immunologic: Negative for immunocompromised state.   Neurological: Positive for headaches. Negative for dizziness, seizures, syncope, speech difficulty, weakness, light-headedness and numbness.   Psychiatric/Behavioral: Negative for confusion, decreased concentration, hallucinations, self-injury, sleep disturbance and suicidal ideas. The patient is not nervous/anxious and is not hyperactive.          Physical Exam:  Physical Exam  Psychiatric:         Attention and Perception: Attention and perception normal.         Mood and Affect: Mood and affect normal.         Speech: Speech normal.         Behavior: Behavior normal. Behavior is cooperative.         Thought Content: Thought content normal. Thought content does not include suicidal ideation. Thought content does not include suicidal plan.         Cognition and Memory: Cognition and memory normal.         Judgment: Judgment normal.         Vital Signs:   /84   Pulse 70   Ht  "175.3 cm (69.02\")   Wt 132 kg (291 lb 9.6 oz)   BMI 43.04 kg/m²      Lab Results:   Lab on 06/24/2022   Component Date Value Ref Range Status   • Class Description 06/24/2022 Comment   Final        Levels of Specific IgE       Class  Description of Class      ---------------------------  -----  --------------------                     < 0.10         0         Negative             0.10 -    0.31         0/I       Equivocal/Low             0.32 -    0.55         I         Low             0.56 -    1.40         II        Moderate             1.41 -    3.90         III       High             3.91 -   19.00         IV        Very High            19.01 -  100.00         V         Very High                    >100.00         VI        Very High   • Egg White 06/24/2022 <0.10  Class 0 kU/L Final   • Peanut 06/24/2022 0.15 (A) Class 0/I kU/L Final   • Soybean 06/24/2022 0.11 (A) Class 0/I kU/L Final   • Milk, Cow's 06/24/2022 0.76 (A) Class II kU/L Final   • Clams 06/24/2022 0.38 (A) Class I kU/L Final   • Shrimp 06/24/2022 0.21 (A) Class 0/I kU/L Final   • [place] 06/24/2022 <0.10  Class 0 kU/L Final   • CodFish 06/24/2022 <0.10  Class 0 kU/L Final   • Scallop 06/24/2022 0.45 (A) Class I kU/L Final   • Wheat 06/24/2022 0.14 (A) Class 0/I kU/L Final   • Corn 06/24/2022 0.24 (A) Class 0/I kU/L Final   • Sesame Seed 06/24/2022 0.28 (A) Class 0/I kU/L Final   Lab on 05/18/2022   Component Date Value Ref Range Status   • Free T4 05/18/2022 1.19  0.93 - 1.70 ng/dL Final   • Sed Rate 05/18/2022 9  0 - 20 mm/hr Final   • TSH 05/18/2022 1.810  0.270 - 4.200 uIU/mL Final   Lab on 04/15/2022   Component Date Value Ref Range Status   • Sed Rate 04/15/2022 9  0 - 20 mm/hr Final       EKG Results:  No orders to display       Imaging Results:  No Images in the past 120 days found..      Assessment & Plan   Diagnoses and all orders for this visit:    1. Bipolar affective disorder, currently depressed, mild (HCC) (Primary)        Visit " Diagnoses:    ICD-10-CM ICD-9-CM   1. Bipolar affective disorder, currently depressed, mild (formerly [facility])  F31.31 296.51       PLAN:  1. Safety: No acute safety concerns  2. Therapy: Declines  3. Risk Assessment: Risk of self-harm acutely is low.  Risk factors include mood disorder, access to guns/weapons, and recent psychosocial stressors (pandemic). Protective factors include no family history, no present SI, no history of suicide attempts or self-harm in the past, minimal AODA, healthcare seeking, future orientation, willingness to engage in care.  Risk of self-harm chronically is also low, but could be further elevated in the event of treatment noncompliance and/or AODA.  4. Meds: Continue Lamictal 200 mg by mouth daily in the morning to target mood.    Continue Cymbalta 20 mg by mouth daily in the morning to target mood and chronic pain. Tolerating well, noted improvement with mood.   5. Labs: n/a    Symptoms described today of increased energy suspect related to Cymbalta effectiveness as patient able to complete more physical tasks and other than having a few days of increased energy patient denied other negative symptoms. Will continue same dose of Cymbalta for now and reassess at next appointment.   Patient to contact provider if symptoms worsen or fail to improve.     8/16/22:   -Declines therapy   Continue Lamictal 200 mg by mouth daily in the morning to target mood. Risks, benefits, alternatives discussed with patient including rash, rebound depressive or manic symptoms if prompt discontinuation, GI upset, agitation, sedation/falls risk.  After discussion of these risks and benefits, patient voiced understanding and agreed to proceed. Refilled today  Continue Cymbalta 20 mg by mouth daily in the morning to target mood and chronic pain as previously started by PCP on 8/3/22.  Discussed all risks, benefits, alternatives, and side effects of Duloxetine including but not limited to GI upset, sexual dysfunction,  bleeding risk, seizure risk, weight loss, insomnia, diaphoresis, drowsiness, headache, dizziness, fatigue, activation of kaleigh or hypomania, increased fragility fracture risk, hyponatremia, increased BP, hepatotoxicity, ocular effects, withdrawal syndrome following abrupt discontinuation, serotonin syndrome, and activation of suicidal ideation and behavior. Patient educated on the need to practice safe sex while taking this medication. Discussed the need for patient to immediately call the office for any new or worsening symptoms, such as worsening depression; feeling nervous or restless; suicidal thoughts or actions; or other changes in mood or behavior, and all other concerns. Patient educated on medication compliance and the risks of suddenly stopping this medication or missing doses. Patient verbalized understanding and is agreeable to taking Duloxetine. Addressed all questions and concerns.     Patient presentation seems most consistent with bipolar depression with mood fluctuations appearing to be more related to physical pain.  As patient reports having 2-3 days per month of feeling low which last for 1 day.  Will continue current regimen as patient has been taking Lamictal 200 mg for 12 yrs which has been effective.    Patient to contact provider if symptoms worsen or fail to improve.        Patient screened positive for depression based on a PHQ-9 score of 11 on 8/16/2022. Follow-up recommendations include: Prescribed antidepressant medication treatment and Suicide Risk Assessment performed.       TREATMENT PLAN/GOALS: Continue supportive psychotherapy efforts and medications as indicated. Treatment and medication options discussed during today's visit. Patient acknowledged and verbally consented to continue with current treatment plan and was educated on the importance of compliance with treatment and follow-up appointments.    MEDICATION ISSUES:  DEANDRE reviewed as expected.  Discussed medication options  and treatment plan of prescribed medication as well as the risks, benefits, and side effects including potential falls, possible impaired driving and metabolic adversities among others. Patient is agreeable to call the office with any worsening of symptoms or onset of side effects. Patient is agreeable to call 911 or go to the nearest ER should he/she begin having SI/HI. No medication side effects or related complaints today.     MEDS ORDERED DURING VISIT:  No orders of the defined types were placed in this encounter.      Return in about 8 weeks (around 12/1/2022) for Next scheduled follow up.         I spent 18 minutes caring for Anthony on this date of service. This time includes time spent by me in the following activities: preparing for the visit, obtaining and/or reviewing a separately obtained history, performing a medically appropriate examination and/or evaluation, counseling and educating the patient/family/caregiver, referring and communicating with other health care professionals and documenting information in the medical record.      This document has been electronically signed by RAY Yates  October 6, 2022 09:07 EDT      Part of this note may be an electronic transcription/translation of spoken language to printed text using the Dragon Dictation System.

## 2022-10-14 ENCOUNTER — OFFICE VISIT (OUTPATIENT)
Dept: PODIATRY | Facility: CLINIC | Age: 57
End: 2022-10-14

## 2022-10-14 VITALS
OXYGEN SATURATION: 99 % | WEIGHT: 290 LBS | BODY MASS INDEX: 42.8 KG/M2 | TEMPERATURE: 97.3 F | DIASTOLIC BLOOD PRESSURE: 79 MMHG | HEART RATE: 68 BPM | SYSTOLIC BLOOD PRESSURE: 150 MMHG

## 2022-10-14 DIAGNOSIS — M79.671 FOOT PAIN, BILATERAL: Primary | ICD-10-CM

## 2022-10-14 DIAGNOSIS — M72.2 PLANTAR FASCIITIS: ICD-10-CM

## 2022-10-14 DIAGNOSIS — M79.672 FOOT PAIN, BILATERAL: Primary | ICD-10-CM

## 2022-10-14 PROBLEM — R20.0 NUMBNESS IN FEET: Status: ACTIVE | Noted: 2022-10-14

## 2022-10-14 PROCEDURE — 99203 OFFICE O/P NEW LOW 30 MIN: CPT | Performed by: PODIATRIST

## 2022-10-14 NOTE — PROGRESS NOTES
[facility] - PODIATRY    Today's Date: 10/14/22    Patient Name: Anthony Brown  MRN: [id number]  CSN: [id number]  PCP: Mercedes Armstrong APRN  Referring Provider: Mercedes Armstrong APRN    SUBJECTIVE     Chief Complaint   Patient presents with   • Left Foot - Numbness, Pain, Establish Care     Numbing sensation and burning.    • Right Foot - Establish Care, Pain, Numbness     Numbing and burning sensation ongoing for 4-5 months.      HPI: Anthony Brown, a 57 y.o.male, comes to clinic.    New, Established, New Problem: New    Location: Bilateral heels    Duration: Summer 2022    Onset:  gradual    Nature:  achy, sharp, shooting    Stable, worsening, improving: Worsening    Aggravating factors:  Patient describes morning bilateral heel pain as stabbing, burning, or aching. This pain usually subsides throughout the day, however it returns after periods of rest and sitting, when standing back up on their feet, and again the next morning.      Previous Treatment: EMG nerve conduction testing, x-ray    Patient denies any fevers, chills, nausea, vomiting, shortness of breath, nor any other constitutional signs nor symptoms.      Past Medical History:   Diagnosis Date   • Acute URI    • Arthritis     bialteral knees   • Abreu's esophagus with dysplasia, unspecified    • Bipolar disorder (HCC)    • Callus Always   • Chronic pain disorder    • Colon polyp 2019?   • Contact with and (suspected) exposure to other viral communicable diseases    • Depression    • Deviated nasal septum    • Difficulty walking Feb 2022    Random pain   • GERD (gastroesophageal reflux disease) 2008   • High cholesterol    • History of chest pain     6 YEARS AGO, HAD STRESS TEST NORMAL   • Hypertension    • Ingrown toenail Childhood   • Low back pain    • Low testosterone level in male     HAS TESTOSTERONE PELLETS IMPLANTED   • Numbness in feet    • Obesity    • Pain in joint     UNSPECIFIED   • Plantar fasciitis 1998    No other  occurrence   • Tate splints Random over years    During or after exercise   • Sinusitis    • Sleep apnea with use of continuous positive airway pressure (CPAP)    • Sleep apnea, unspecified    • SOB (shortness of breath)    • Testicular hypofunction    • Testosterone deficiency      Past Surgical History:   Procedure Laterality Date   • COLONOSCOPY  ?   • HERNIA REPAIR     • JOINT REPLACEMENT  2017    Right knee   • KNEE SURGERY Right    • NM TOTAL KNEE ARTHROPLASTY N/A 2017    Procedure: RIGHT TOTAL KNEE ARTHROPLASTY, LEFT KNEE INJECTED WITH CORTISONE;  Surgeon: Bradford Torres MD;  Location: [facility] OR;  Service: Orthopedics   • TONSILLECTOMY     • VASECTOMY       Family History   Problem Relation Age of Onset   • Diabetes Mother    • Hypertension Mother    • Depression Father    • Colon polyps Father    • Stroke Father    • Depression Sister    • Hypertension Sister    • Rheum arthritis Sister    • Arthritis Sister    • ADD / ADHD Nephew    • Malig Hyperthermia Neg Hx      Social History     Socioeconomic History   • Marital status:    • Number of children: 2   Tobacco Use   • Smoking status: Former     Packs/day: 0.25     Years: 3.00     Pack years: 0.75     Types: Cigarettes, Pipe, Cigars     Start date: 1981     Quit date: 1984     Years since quittin.1   • Smokeless tobacco: Never   • Tobacco comments:     smoked as a teenager   Vaping Use   • Vaping Use: Never used   Substance and Sexual Activity   • Alcohol use: Yes     Alcohol/week: 3.0 standard drinks     Types: 3 Cans of beer per week     Comment: socially   • Drug use: Never     Types: Marijuana     Comment: in college   • Sexual activity: Yes     Partners: Female     Birth control/protection: Surgical, Post-menopausal     No Known Allergies  Current Outpatient Medications   Medication Sig Dispense Refill   • celecoxib (CeleBREX) 200 MG capsule Take 1 capsule by mouth Daily 90 capsule 3   • cetirizine  (zyrTEC) 5 MG tablet Take 5 mg by mouth Daily.     • Cholecalciferol (VITAMIN D3) 5000 UNITS capsule capsule Take 2,000 Units by mouth Daily.     • colestipol (COLESTID) 1 g tablet Take 1 tablet by mouth 2 (Two) Times a Day With Meals. 60 tablet 3   • dicyclomine (Bentyl) 10 MG capsule Take 1 capsule by mouth 4 (Four) Times a Day Before Meals & at Bedtime As Needed (diarrhea/urgency). 90 capsule 0   • DULoxetine (Cymbalta) 20 MG capsule Take 1 capsule by mouth Daily. 30 capsule 0   • fenofibrate 160 MG tablet Take 1 tablet by mouth Daily. 90 tablet 0   • fluticasone (FLONASE) 50 MCG/ACT nasal spray Use 2 sprays into the nostril(s) as directed by provider Daily. 16 g 3   • lamoTRIgine (LaMICtal) 200 MG tablet Take 1 tablet by mouth Every Morning. 90 tablet 2   • lisinopril (PRINIVIL,ZESTRIL) 20 MG tablet Take 1 tablet by mouth Daily. 90 tablet 0   • multivitamin with minerals tablet tablet Take 1 tablet by mouth Daily.     • omeprazole (priLOSEC) 20 MG capsule Take 1 capsule by mouth Daily. 90 capsule 0   • Probiotic Product (PROBIOTIC PO) Take 1 capsule by mouth Daily. PT TO HOLD MED PRIOR TO OR     • Turmeric 500 MG capsule Take  by mouth.     • vitamin B-12 (CYANOCOBALAMIN) 1000 MCG tablet Take 5,000 mcg by mouth Daily.       No current facility-administered medications for this visit.     Review of Systems   Constitutional: Negative.    Musculoskeletal:        Bilateral heel pain   All other systems reviewed and are negative.      OBJECTIVE     Vitals:    10/14/22 0941   BP: 150/79   Pulse: 68   Temp: 97.3 °F (36.3 °C)   SpO2: 99%       PHYSICAL EXAM     Foot/Ankle Exam:       General:   Appearance: obesity    Orientation: AAOx3    Affect: appropriate    Gait: unimpaired    Shoe Gear:  Sandals    VASCULAR      Right Foot Vascularity   Normal vascular exam    Dorsalis pedis:  2+  Posterior tibial:  2+  Skin Temperature: warm    Edema Grading:  None  CFT:  < 3 seconds  Pedal Hair Growth:  Present  Varicosities:  mild varicosities       Left Foot Vascularity   Normal vascular exam    Dorsalis pedis:  2+  Posterior tibial:  2+  Skin Temperature: warm    Edema Grading:  None  CFT:  < 3 seconds  Pedal Hair Growth:  Present  Varicosities: mild varicosities        NEUROLOGIC     Right Foot Neurologic   Normal sensation    Light touch sensation:  Normal  Vibratory sensation:  Normal  Hot/Cold sensation: normal       Left Foot Neurologic   Normal sensation    Light touch sensation:  Normal  Vibratory sensation:  Normal  Hot/cold sensation: normal       MUSCULOSKELETAL      Right Foot Musculoskeletal   Tenderness: plantar fascia and plantar heel       Left Foot Musculoskeletal   Tenderness: plantar fascia and plantar heel       MUSCLE STRENGTH     Right Foot Muscle Strength   Foot dorsiflexion:  4  Foot plantar flexion:  4  Foot inversion:  4  Foot eversion:  4     Left Foot Muscle Strength   Foot dorsiflexion:  4  Foot plantar flexion:  4  Foot inversion:  4  Foot eversion:  4     RANGE OF MOTION      Right Foot Range of Motion   Foot and ankle ROM within normal limits       Left Foot Range of Motion   Foot and ankle ROM within normal limits       DERMATOLOGIC     Right Foot Dermatologic   Skin: skin intact    Nails: normal       Left Foot Dermatologic   Skin: skin intact    Nails: normal        RADIOLOGY:    PROCEDURE:  XR FOOT 3+ VW RIGHT     COMPARISON: None     INDICATIONS:  BILATERAL FOOT PAIN     FINDINGS:          There is mild joint space narrowing identified in the interphalangeal joints of digits 2 through 5   and there is mild joint space narrowing at the 1st MTP joint.  No fractures are identified there is   no subluxation.  The bony elements are otherwise intact.  There is a small calcaneal spur and there   is ossification of the Achilles tendon insertion.     IMPRESSION:               Very early changes of osteoarthritis as described above               Miguel Angel Vasquez MD         Electronically Signed and Approved By:  Miguel Angel Vasquez MD on 8/31/2022 at 12:35             PROCEDURE:  XR FOOT 3+ VW LEFT     COMPARISON: None     INDICATIONS:  BILATERAL FOOT PAIN     FINDINGS:          There is joint space narrowing identified at the interphalangeal joints.  There is very mild joint   space narrowing at the 1st MTP joint.  Node bone erosion or destruction is seen and there are no   fractures.  There is a small calcaneal spur and there is ossification of the Achilles tendon   insertion.     IMPRESSION:               Early radiographic changes of osteoarthritis               Miguel Angel Vasquez MD         Electronically Signed and Approved By: Miguel Angel Vasquez MD on 8/31/2022 at 12:36      ASSESSMENT/PLAN     Diagnoses and all orders for this visit:    1. Foot pain, bilateral (Primary)    2. Plantar fasciitis  -     Ambulatory Referral For Orthotics    Prescriptions written for custom-made orthotics    Comprehensive lower extremity examination and evaluation was performed.    Discussed findings and treatment plan including risks, benefits, and treatment options with patient in detail. Patient agreed with treatment plan.    Treatment Options discussed:  - no treatment at all  - change in shoegear  - change in activities  - RICE therapy  - arch support  - NSAIDs  - PO steroids  - injectable steroids    Patient may begin to weight bear as tolerated in supportive shoes.  No impact activities for two weeks.  After that time, the patient may increase activities as tolerated. Patient states understanding and agreement with this plan.    An After Visit Summary was printed and given to the patient at discharge, including (if requested) any available informative/educational handouts regarding diagnosis, treatment, or medications. All questions were answered to patient/family satisfaction. Should symptoms fail to improve or worsen they agree to call or return to clinic or to go to the Emergency Department. Discussed the importance of following up with any  needed screening tests/labs/specialist appointments and any requested follow-up recommended by me today. Importance of maintaining follow-up discussed and patient accepts that missed appointments can delay diagnosis and potentially lead to worsening of conditions.    Return if symptoms worsen or fail to improve., or sooner if acute issues arise.    This document has been electronically signed by Yoni Rod DPM on October 14, 2022 10:23 EDT

## 2022-10-17 ENCOUNTER — OFFICE VISIT (OUTPATIENT)
Dept: FAMILY MEDICINE CLINIC | Age: 57
End: 2022-10-17

## 2022-10-17 VITALS
TEMPERATURE: 98.9 F | OXYGEN SATURATION: 97 % | BODY MASS INDEX: 43.25 KG/M2 | SYSTOLIC BLOOD PRESSURE: 126 MMHG | DIASTOLIC BLOOD PRESSURE: 75 MMHG | WEIGHT: 292 LBS | HEIGHT: 69 IN | HEART RATE: 68 BPM

## 2022-10-17 DIAGNOSIS — Z00.00 ROUTINE GENERAL MEDICAL EXAMINATION AT A HEALTH CARE FACILITY: Primary | ICD-10-CM

## 2022-10-17 DIAGNOSIS — R19.7 DIARRHEA, UNSPECIFIED TYPE: ICD-10-CM

## 2022-10-17 DIAGNOSIS — E78.00 HIGH CHOLESTEROL: ICD-10-CM

## 2022-10-17 DIAGNOSIS — F31.77 BIPOLAR DISORDER, IN PARTIAL REMISSION, MOST RECENT EPISODE MIXED: ICD-10-CM

## 2022-10-17 DIAGNOSIS — R53.83 OTHER FATIGUE: ICD-10-CM

## 2022-10-17 DIAGNOSIS — R73.09 ELEVATED GLUCOSE: ICD-10-CM

## 2022-10-17 DIAGNOSIS — Z11.59 SCREENING FOR VIRAL DISEASE: ICD-10-CM

## 2022-10-17 DIAGNOSIS — M17.11 PRIMARY OSTEOARTHRITIS OF RIGHT KNEE: ICD-10-CM

## 2022-10-17 DIAGNOSIS — Z79.1 LONG TERM (CURRENT) USE OF NON-STEROIDAL ANTI-INFLAMMATORIES (NSAID): ICD-10-CM

## 2022-10-17 DIAGNOSIS — Z13.6 SCREENING FOR CARDIOVASCULAR CONDITION: ICD-10-CM

## 2022-10-17 DIAGNOSIS — K22.719 BARRETT'S ESOPHAGUS WITH DYSPLASIA, UNSPECIFIED: ICD-10-CM

## 2022-10-17 DIAGNOSIS — I10 PRIMARY HYPERTENSION: ICD-10-CM

## 2022-10-17 PROBLEM — R11.11 VOMITING WITHOUT NAUSEA: Status: RESOLVED | Noted: 2022-03-15 | Resolved: 2022-10-17

## 2022-10-17 PROCEDURE — 99396 PREV VISIT EST AGE 40-64: CPT | Performed by: NURSE PRACTITIONER

## 2022-10-17 RX ORDER — FENOFIBRATE 160 MG/1
160 TABLET ORAL DAILY
Qty: 90 TABLET | Refills: 3 | Status: SHIPPED | OUTPATIENT
Start: 2022-10-17

## 2022-10-17 RX ORDER — CELECOXIB 200 MG/1
200 CAPSULE ORAL DAILY
Qty: 90 CAPSULE | Refills: 3 | Status: SHIPPED | OUTPATIENT
Start: 2022-10-17 | End: 2023-01-15

## 2022-10-17 RX ORDER — LISINOPRIL 20 MG/1
20 TABLET ORAL DAILY
Qty: 90 TABLET | Refills: 1 | Status: SHIPPED | OUTPATIENT
Start: 2022-10-17

## 2022-10-17 RX ORDER — OMEPRAZOLE 20 MG/1
20 CAPSULE, DELAYED RELEASE ORAL DAILY
Qty: 90 CAPSULE | Refills: 1 | Status: SHIPPED | OUTPATIENT
Start: 2022-10-17

## 2022-10-17 RX ORDER — MONTELUKAST SODIUM 4 MG/1
1 TABLET, CHEWABLE ORAL 2 TIMES DAILY WITH MEALS
Qty: 180 TABLET | Refills: 1 | Status: SHIPPED | OUTPATIENT
Start: 2022-10-17

## 2022-10-17 NOTE — ASSESSMENT & PLAN NOTE
Psychological condition is improving with treatment.  Continue current treatment regimen.  continue with [facility] [place] as recommended   Psychological condition  will be reassessed at the next regular appointment.

## 2022-10-17 NOTE — ASSESSMENT & PLAN NOTE
Continues with diarrhea - Bentyl available but has not been taking;  Colestipol daily and feels that it does help (tried to stop)

## 2022-10-17 NOTE — PROGRESS NOTES
Assessment and Plan   Diagnoses and all orders for this visit:    1. Routine general medical examination at a health care facility (Primary)  Comments:  continue with weight loss efforts, use weight/ strengthening exercises/ calorie monitoring;  annual wellness/ health maintenance recommendations discussed     2. Abreu's esophagus with dysplasia, unspecified  Comments:  continue with omeprazole , follow up with routine EGD as recommended   Assessment & Plan:  Last EGD  4/2022 Dr. Francisco  Stable  Follow up 3 years     Orders:  -     CBC No Differential; Future  -     omeprazole (priLOSEC) 20 MG capsule; Take 1 capsule by mouth Daily.  Dispense: 90 capsule; Refill: 1    3. Bipolar disorder, in partial remission, most recent episode mixed (HCC)  Comments:  continue to follow up wtih Hanna Bethea as scheduled   Assessment & Plan:  Psychological condition is improving with treatment.  Continue current treatment regimen.  continue with [facility] [place] as recommended   Psychological condition  will be reassessed at the next regular appointment.      4. Diarrhea, unspecified type  Comments:  I have advised him to take the bentyl prior to large meals if he feels this generally triggers or when going out to eat - consider new referral to gastro   Assessment & Plan:  Continues with diarrhea - Bentyl available but has not been taking;  Colestipol daily and feels that it does help (tried to stop)    Orders:  -     colestipol (COLESTID) 1 g tablet; Take 1 tablet by mouth 2 (Two) Times a Day With Meals.  Dispense: 180 tablet; Refill: 1    5. Long term (current) use of non-steroidal anti-inflammatories (nsaid)  Comments:  will check CBC  consider alternative therapy   Orders:  -     CBC No Differential; Future    6. Primary osteoarthritis of right knee  Comments:  continue current treatment.  Follow up with Dr. Rubio as recommended   Orders:  -     CBC No Differential; Future  -     celecoxib (CeleBREX) 200 MG capsule; Take 1  capsule by mouth Daily  Dispense: 90 capsule; Refill: 3    7. Diarrhea, unspecified type  Comments:  stay well hydrated   Assessment & Plan:  Continues with diarrhea - Bentyl available but has not been taking;  Colestipol daily and feels that it does help (tried to stop)    Orders:  -     colestipol (COLESTID) 1 g tablet; Take 1 tablet by mouth 2 (Two) Times a Day With Meals.  Dispense: 180 tablet; Refill: 1    8. High cholesterol  Comments:  continue current treatment   Orders:  -     fenofibrate 160 MG tablet; Take 1 tablet by mouth Daily.  Dispense: 90 tablet; Refill: 3    9. Primary hypertension  -     lisinopril (PRINIVIL,ZESTRIL) 20 MG tablet; Take 1 tablet by mouth Daily.  Dispense: 90 tablet; Refill: 1    10. Other fatigue  -     Vitamin B12; Future    11. Screening for viral disease  -     Hepatitis C antibody; Future    12. Screening for cardiovascular condition  -     Lipid panel; Future  -     Comprehensive metabolic panel; Future                Follow Up   Return in about 6 months (around 4/17/2023) for Recheck.    Chief Complaint  Anthony Brown presents to [facility] for Annual Exam    Subjective          History of Present Illness  Jeremy is here today for annual exam.   Last annual exam was 1 year  Last eye exam: 1 year  PROVIDER:  [facility]  Last dental exam:   2 weeks    PROVIDER:  [facility]   Diet / exercise:   Keto diet;  Started walking      Patient's Body mass index is 43.1 kg/m². indicating that he is obese (BMI >30). Obesity-related health conditions include the following: obstructive sleep apnea, hypertension and dyslipidemias. Obesity is worsening. BMI is is above average; BMI management plan is completed. We discussed portion control and increasing exercise..  Satisfied with weight?  no    Patient Care Team:  Mercedes Armstrong APRN as PCP - General (Nurse Practitioner)  Kartik Crews MD as Consulting Physician (Pain Medicine)  Milo Deluca MD  as Consulting Physician (Pulmonary Disease)  Hanna Bethea APRN as Nurse Practitioner (Behavioral Health)  Yoni Rod DPM as Consulting Physician (Podiatry)     The following health maintenance recommendations have been discussed and ordered as allowed per patient discussion:  ZOSTER VACCINE(1 of 2) Never done  DISCUSSED INSURANCE  HEPATITIS C SCREENING Never done  Scheduled with next labs   COVID-19 Vaccine(2 - Booster for Carter series) due on 07/09/2021  INFLUENZA VACCINE Never done  DECLINES  LIPID PANEL due on 09/02/2022  ORDERED    Jeremy presents today for follow up on hyperlipidemia.  Previous values: Lab Results       Component                Value               Date                       CHOL                     183                 09/02/2021                 CHLPL                    183                 11/22/2019                 TRIG                     373 (H)             09/02/2021                 HDL                      23 (L)              09/02/2021                 LDL                      97                  09/02/2021           ;    Current CVD 10yr risk is The 10-year ASCVD risk score (Karen DEVLIN, et al., 2019) is: 12%    Values used to calculate the score:      Age: 57 years      Sex: Male      Is Non- : No      Diabetic: No      Tobacco smoker: No      Systolic Blood Pressure: 126 mmHg      Is BP treated: Yes      HDL Cholesterol: 23 mg/dL      Total Cholesterol: 183 mg/dL ;    Jeremy reports compliant with medication which is fenofibrate.    No side effects reported from this medication .   No new concerns to discuss today.      Jeremy presents for follow up on hypertension.  Compliance with medication is reported as good   Check of BP at home reported as well controlled.  No new concerns or problems to report.  Taking lisinopril daily     Obesity is stable  Slightly higher - but reports that he is now working on Keto diet to help with weight loss.  Unable to  exercise due to foot/knee pain and arthritis pain    Barretts esophagus  Last EGD  4/2022  Stable  Recommend every 3 years  Dr. Francisco    Bipolar - depression stable for now  On lamictal and cymbalta  Managed by RAY Yates    Diarrhea - reports that still persists with diarrhea episodes  Taking colestipol daily and he did try to stop , but 'this was a mistake'  Symptoms came back stronger.  He has been back on for about 2 weeks and still has not leveled back off.  He has been prescribed bentyl PRN  And reports unable to take , as this 'hits without notice' so has not been able to determine best time to take this medication.  He did have appt with gastro in past that he canceled as he felt his symptoms improved at the time of the appt    Low back pain/ arthritis / joint pain / knee pain - plan to follow up for possible left knee surgery 'when I can get this weight off'  ;  Foot pain/ plantar fasciitis with Dr. Rod   - orthotics have been ordered to see if improves, continue celecoxib ;  Back is stable for now on current treatment     Sleep apnea - stable with no concerns. Does not follow up with sleep specialty on a regular basis                     Review of Systems   HENT: Positive for congestion (CONTROLLED ON MEDICATION). Negative for sinus pressure and trouble swallowing.    Eyes: Positive for visual disturbance (glasses). Negative for blurred vision.   Respiratory: Negative for cough and shortness of breath.    Cardiovascular: Negative for chest pain and leg swelling.   Gastrointestinal: Positive for diarrhea (intermittent ) and indigestion (controlled on medication). Negative for abdominal pain, blood in stool and nausea.   Genitourinary: Negative for dysuria and frequency.   Musculoskeletal: Positive for arthralgias (foot and knee pain) and back pain (normal  for him).   Skin: Positive for skin lesions.        Increase in oily skin     Allergic/Immunologic: Positive for environmental allergies.  "  Neurological: Negative for dizziness and headache.   Psychiatric/Behavioral: Positive for depressed mood (controlled on medication). Negative for sleep disturbance. The patient is nervous/anxious (controlle don medication).        Objective     Vitals:    10/17/22 1058   BP: 126/75   BP Location: Left arm   Patient Position: Sitting   Cuff Size: Adult   Pulse: 68   Temp: 98.9 °F (37.2 °C)   TempSrc: Oral   SpO2: 97%   Weight: 132 kg (292 lb)   Height: 175.3 cm (69.02\")     Body mass index is 43.1 kg/m².     Physical Exam  Vitals reviewed.   Constitutional:       Appearance: Normal appearance. He is obese.   HENT:      Head: Normocephalic.      Mouth/Throat:      Mouth: Mucous membranes are moist.      Pharynx: Oropharynx is clear.   Eyes:      Conjunctiva/sclera: Conjunctivae normal.   Cardiovascular:      Rate and Rhythm: Normal rate and regular rhythm.      Heart sounds: No murmur heard.  Pulmonary:      Effort: Pulmonary effort is normal.      Breath sounds: Normal breath sounds.   Abdominal:      General: Bowel sounds are normal.      Tenderness: There is no abdominal tenderness.   Musculoskeletal:         General: Normal range of motion.      Cervical back: Normal range of motion.   Skin:     General: Skin is warm and dry.      Findings: Lesion (actinic keratosis to left arm/ left shoulder ) present.   Neurological:      General: No focal deficit present.      Mental Status: He is alert. Mental status is at baseline.   Psychiatric:         Mood and Affect: Mood normal.         Behavior: Behavior normal.         Thought Content: Thought content normal.         Result Review                        No Known Allergies   Past Medical History:   Diagnosis Date   • Acute URI    • Arthritis     bialteral knees   • Abreu's esophagus with dysplasia, unspecified    • Bipolar disorder (HCC)    • Callus Always   • Chronic pain disorder    • Colon polyp 2019?   • Contact with and (suspected) exposure to other viral " communicable diseases    • Depression    • Deviated nasal septum    • Difficulty walking Feb 2022    Random pain   • GERD (gastroesophageal reflux disease) 2008   • High cholesterol    • History of chest pain     6 YEARS AGO, HAD STRESS TEST NORMAL   • Hypertension    • Ingrown toenail Childhood   • Low back pain    • Low testosterone level in male     HAS TESTOSTERONE PELLETS IMPLANTED   • Numbness in feet    • Obesity    • Pain in joint     UNSPECIFIED   • Plantar fasciitis 1998    No other occurrence   • Tate splints Random over years    During or after exercise   • Sinusitis    • Sleep apnea with use of continuous positive airway pressure (CPAP)    • Sleep apnea, unspecified    • SOB (shortness of breath)    • Testicular hypofunction    • Testosterone deficiency      Current Outpatient Medications   Medication Sig Dispense Refill   • celecoxib (CeleBREX) 200 MG capsule Take 1 capsule by mouth Daily 90 capsule 3   • cetirizine (zyrTEC) 5 MG tablet Take 5 mg by mouth Daily.     • Cholecalciferol (VITAMIN D3) 5000 UNITS capsule capsule Take 2,000 Units by mouth Daily.     • colestipol (COLESTID) 1 g tablet Take 1 tablet by mouth 2 (Two) Times a Day With Meals. 180 tablet 1   • dicyclomine (Bentyl) 10 MG capsule Take 1 capsule by mouth 4 (Four) Times a Day Before Meals & at Bedtime As Needed (diarrhea/urgency). 90 capsule 0   • DULoxetine (Cymbalta) 20 MG capsule Take 1 capsule by mouth Daily. 30 capsule 0   • fenofibrate 160 MG tablet Take 1 tablet by mouth Daily. 90 tablet 3   • fluticasone (FLONASE) 50 MCG/ACT nasal spray Use 2 sprays into the nostril(s) as directed by provider Daily. 16 g 3   • lamoTRIgine (LaMICtal) 200 MG tablet Take 1 tablet by mouth Every Morning. 90 tablet 2   • lisinopril (PRINIVIL,ZESTRIL) 20 MG tablet Take 1 tablet by mouth Daily. 90 tablet 1   • multivitamin with minerals tablet tablet Take 1 tablet by mouth Daily.     • omeprazole (priLOSEC) 20 MG capsule Take 1 capsule by mouth Daily.  90 capsule 1   • Probiotic Product (PROBIOTIC PO) Take 1 capsule by mouth Daily. PT TO HOLD MED PRIOR TO OR     • Turmeric 500 MG capsule Take 1 capsule by mouth Daily.     • vitamin B-12 (CYANOCOBALAMIN) 1000 MCG tablet Take 5,000 mcg by mouth Daily.       No current facility-administered medications for this visit.     Past Surgical History:   Procedure Laterality Date   • COLONOSCOPY  2019?   • HERNIA REPAIR     • JOINT REPLACEMENT  June 2017    Right knee   • KNEE SURGERY Right 1985   • ID TOTAL KNEE ARTHROPLASTY N/A 6/26/2017    Procedure: RIGHT TOTAL KNEE ARTHROPLASTY, LEFT KNEE INJECTED WITH CORTISONE;  Surgeon: Bradford Torres MD;  Location: [facility];  Service: Orthopedics   • TONSILLECTOMY  1970   • VASECTOMY  1998      Health Maintenance Due   Topic Date Due   • HEPATITIS C SCREENING  Never done   • LIPID PANEL  09/02/2022      Immunization History   Administered Date(s) Administered   • COVID-19 (JAGDEEP) 05/14/2021   • TD Preservative Free 03/10/2017

## 2022-10-18 ENCOUNTER — LAB (OUTPATIENT)
Dept: LAB | Facility: HOSPITAL | Age: 57
End: 2022-10-18

## 2022-10-18 DIAGNOSIS — G89.29 OTHER CHRONIC PAIN: ICD-10-CM

## 2022-10-18 DIAGNOSIS — M17.11 PRIMARY OSTEOARTHRITIS OF RIGHT KNEE: ICD-10-CM

## 2022-10-18 DIAGNOSIS — K22.719 BARRETT'S ESOPHAGUS WITH DYSPLASIA, UNSPECIFIED: ICD-10-CM

## 2022-10-18 DIAGNOSIS — R53.83 OTHER FATIGUE: ICD-10-CM

## 2022-10-18 DIAGNOSIS — R73.09 ELEVATED GLUCOSE: ICD-10-CM

## 2022-10-18 DIAGNOSIS — Z79.1 LONG TERM (CURRENT) USE OF NON-STEROIDAL ANTI-INFLAMMATORIES (NSAID): ICD-10-CM

## 2022-10-18 DIAGNOSIS — Z11.59 SCREENING FOR VIRAL DISEASE: ICD-10-CM

## 2022-10-18 DIAGNOSIS — Z13.6 SCREENING FOR CARDIOVASCULAR CONDITION: ICD-10-CM

## 2022-10-18 LAB
ALBUMIN SERPL-MCNC: 4.3 G/DL (ref 3.5–5.2)
ALBUMIN/GLOB SERPL: 1.7 G/DL
ALP SERPL-CCNC: 76 U/L (ref 39–117)
ALT SERPL W P-5'-P-CCNC: 39 U/L (ref 1–41)
ANION GAP SERPL CALCULATED.3IONS-SCNC: 10.4 MMOL/L (ref 5–15)
AST SERPL-CCNC: 27 U/L (ref 1–40)
BILIRUB SERPL-MCNC: 0.3 MG/DL (ref 0–1.2)
BUN SERPL-MCNC: 15 MG/DL (ref 6–20)
BUN/CREAT SERPL: 13.8 (ref 7–25)
CALCIUM SPEC-SCNC: 9.5 MG/DL (ref 8.6–10.5)
CHLORIDE SERPL-SCNC: 102 MMOL/L (ref 98–107)
CHOLEST SERPL-MCNC: 196 MG/DL (ref 0–200)
CO2 SERPL-SCNC: 26.6 MMOL/L (ref 22–29)
CREAT SERPL-MCNC: 1.09 MG/DL (ref 0.76–1.27)
DEPRECATED RDW RBC AUTO: 42.2 FL (ref 37–54)
EGFRCR SERPLBLD CKD-EPI 2021: 79.7 ML/MIN/1.73
ERYTHROCYTE [DISTWIDTH] IN BLOOD BY AUTOMATED COUNT: 13.1 % (ref 12.3–15.4)
GLOBULIN UR ELPH-MCNC: 2.5 GM/DL
GLUCOSE SERPL-MCNC: 108 MG/DL (ref 65–99)
HCT VFR BLD AUTO: 45.1 % (ref 37.5–51)
HCV AB SER DONR QL: NORMAL
HDLC SERPL-MCNC: 26 MG/DL (ref 40–60)
HGB BLD-MCNC: 14.7 G/DL (ref 13–17.7)
LDLC SERPL CALC-MCNC: 115 MG/DL (ref 0–100)
LDLC/HDLC SERPL: 4.14 {RATIO}
MCH RBC QN AUTO: 28.9 PG (ref 26.6–33)
MCHC RBC AUTO-ENTMCNC: 32.6 G/DL (ref 31.5–35.7)
MCV RBC AUTO: 88.6 FL (ref 79–97)
PLATELET # BLD AUTO: 222 10*3/MM3 (ref 140–450)
PMV BLD AUTO: 10.5 FL (ref 6–12)
POTASSIUM SERPL-SCNC: 3.9 MMOL/L (ref 3.5–5.2)
PROT SERPL-MCNC: 6.8 G/DL (ref 6–8.5)
RBC # BLD AUTO: 5.09 10*6/MM3 (ref 4.14–5.8)
SODIUM SERPL-SCNC: 139 MMOL/L (ref 136–145)
TRIGL SERPL-MCNC: 312 MG/DL (ref 0–150)
VIT B12 BLD-MCNC: >2000 PG/ML (ref 211–946)
VLDLC SERPL-MCNC: 55 MG/DL (ref 5–40)
WBC NRBC COR # BLD: 4.9 10*3/MM3 (ref 3.4–10.8)

## 2022-10-18 PROCEDURE — 85027 COMPLETE CBC AUTOMATED: CPT

## 2022-10-18 PROCEDURE — 82607 VITAMIN B-12: CPT

## 2022-10-18 PROCEDURE — 80053 COMPREHEN METABOLIC PANEL: CPT

## 2022-10-18 PROCEDURE — 86803 HEPATITIS C AB TEST: CPT

## 2022-10-18 PROCEDURE — 83036 HEMOGLOBIN GLYCOSYLATED A1C: CPT

## 2022-10-18 PROCEDURE — 36415 COLL VENOUS BLD VENIPUNCTURE: CPT

## 2022-10-18 PROCEDURE — 80061 LIPID PANEL: CPT

## 2022-10-19 LAB — HBA1C MFR BLD: 6.2 % (ref 4.8–5.6)

## 2022-10-19 RX ORDER — DULOXETIN HYDROCHLORIDE 20 MG/1
20 CAPSULE, DELAYED RELEASE ORAL DAILY
Qty: 30 CAPSULE | Refills: 0 | Status: SHIPPED | OUTPATIENT
Start: 2022-10-19 | End: 2022-11-18 | Stop reason: SDUPTHER

## 2022-11-18 DIAGNOSIS — G89.29 OTHER CHRONIC PAIN: ICD-10-CM

## 2022-11-18 RX ORDER — DULOXETIN HYDROCHLORIDE 20 MG/1
20 CAPSULE, DELAYED RELEASE ORAL DAILY
Qty: 30 CAPSULE | Refills: 0 | Status: SHIPPED | OUTPATIENT
Start: 2022-11-18 | End: 2022-12-01 | Stop reason: SDUPTHER

## 2022-12-01 ENCOUNTER — OFFICE VISIT (OUTPATIENT)
Dept: BEHAVIORAL HEALTH | Facility: CLINIC | Age: 57
End: 2022-12-01

## 2022-12-01 VITALS
WEIGHT: 293 LBS | BODY MASS INDEX: 43.4 KG/M2 | SYSTOLIC BLOOD PRESSURE: 133 MMHG | HEIGHT: 69 IN | DIASTOLIC BLOOD PRESSURE: 75 MMHG | HEART RATE: 76 BPM

## 2022-12-01 DIAGNOSIS — G89.29 OTHER CHRONIC PAIN: ICD-10-CM

## 2022-12-01 DIAGNOSIS — F31.75 BIPOLAR DISORDER, IN PARTIAL REMISSION, MOST RECENT EPISODE DEPRESSED: Primary | ICD-10-CM

## 2022-12-01 PROCEDURE — 99213 OFFICE O/P EST LOW 20 MIN: CPT | Performed by: NURSE PRACTITIONER

## 2022-12-01 RX ORDER — DULOXETIN HYDROCHLORIDE 20 MG/1
20 CAPSULE, DELAYED RELEASE ORAL DAILY
Qty: 90 CAPSULE | Refills: 1 | Status: SHIPPED | OUTPATIENT
Start: 2022-12-01 | End: 2023-05-30

## 2022-12-01 NOTE — PATIENT INSTRUCTIONS
"1.  Please return to clinic at your next scheduled visit.  Contact the [facility] ([phone]) or **Shawna, Medical Assistant at [place] Office directly at [phone] at least 24 hours prior in the event you need to cancel.**    2. Should you want to get in touch with your provider, RAY Yates, please contact MY Medical Assistant, Shawna, directly at [phone].  Recommend saving Shawna's direct number in phone as this is the PREFERRED & EASIEST way to get in contact with your provider.  Please leave a voice mail if you do not get an answer and she will return your call within 24 hrs. You will NOT be able to contact provider on [facility], as Behavioral Health Providers are restricted. YOU MUST CALL [phone]    If you need to speak with the on call provider after hours or on weekends, please Contact the [facility] ([phone]) and staff will be able to page the provider on call directly.        3, MEDICATION REFILLS:  PLEASE CALL THE PHARMACY TO REQUEST ALL MEDICATION REFILLS TO ENSURE YOU ARE RECEIVING YOUR MEDICATIONS IN A TIMELY MANNER.    IF YOU USE AN AUTOMATED SERVICE AT THE PHARMACY FOR REFILLS AND ARE TOLD THERE ARE \"NO REFILLS REMAINING\"   PLEASE CALL THE PHARMACY & SPEAK TO A LIVE PERSON TO VERIFY IT IS THE MOST UP TO DATE PRESCRIPTION ON FILE.    All new prescriptions will have a different number, therefore, if you were given refills for a medication today or at last visit it will not have the same number as the previous prescription.       4.  In the event you have personal crisis, contact the following crisis numbers: Suicide Prevention Hotline 1-576.297.3841 or *988, [facility] Helpline 5-428-037-SAMUEL; [facility] [phone]; text HELLO to 377656; or 218.      5. We would appreciate your feedback, please scan the QRS code on the back of your appointment card (or see below) and complete a brief survey.  [place] location is still not available, so " "please click \"[place]\" location.  Thank you      SPECIFIC RECOMMENDATIONS:     1.      Medications discussed at this encounter:                   - Refilled Cymbalta for 90 days     2.      Psychotherapy recommendations: Declined     3.     Return to clinic: 2 months    Please arrive at least 15 minutes before your scheduled appointment time to complete check in process.      IF you are scheduled for a [employer] VIDEO visit, PLEASE ANSWER YOUR PHONE WHEN OFFICE CALLS PRIOR TO VISIT TO COMPLETE THE CHECK IN PROCESS, EVEN IF THE E-CHECK IN WAS COMPLETED.     If you would like to log on to [employer] and complete the \"E-Check IN\" prior to your visit, please do so, this will speed up the check in process.  If you are due for questionnaires, you will find those on [employer] as well, please try to complete prior to your scheduled appointment.          "

## 2022-12-01 NOTE — PROGRESS NOTES
"Answers for HPI/ROS submitted by the patient on 11/24/2022  What is the primary reason for your visit?: Other  Please describe your symptoms.: None  Have you had these symptoms before?: Yes  How long have you been having these symptoms?: Greater than 2 weeks  Please list any medications you are currently taking for this condition.: Lamictal, Cymbalta      Subjective   Anthony Brown is a 57 y.o. male who presents today for follow up    Referring Provider:  Mercedes Armstrong, APRN  [address] [facility]  [address]  [place] [address]    Chief Complaint:  Medication check     History of Present Illness:    12/1/22:  Patient presents today in office, \"I am doing good.\"  Patient reports overall mental health has improved.  Patient reports having a new diagnosis of \"long COVID\", which has relieved some anxiety about physical health decline.  As patient suffers from chronic joint pain and COVID symptoms exacerbated the inflammation.  Patient expresses no longer feeling alone in relation to long term COVID symptoms. Diarrhea has been ongoing which was told it will subside. Muscle joint pain for 9 months. Prior only had in hands, now in hands,feet,shoulders.  Patient had quit working in June 2022, originally had COVID end of Jan. 2022, however, patient is now no longer employed and has applied for Disability, Social Security.  Financial issues have been a stressor, which is starting to ease.  Wife is working via [employer] as a contract employee, though does not have health insurance.  Patient is now paying for insurance out of pocket instead of employer, [employer].     Patient has started working with physical therapy at [facility] to prepare for knee replacement in the future.  Patient is considering starting therapy, and wishes to discuss further at next visit.     Denies mood fluctuations with bipolar.  Chronic pain continues to flare at times with increased activity, which does bring mood down, though able to recover and not " "allowing depression to linger throughout the day.         9/20/22:  Patient presents today in office, \"well my wife says I am better. I can see the mood is up a little bit, a couple days I felt manic, but it could be that I was normal and not used to it.\"    Patient feels Cymbalta has been helpful with fibromyalgia symptoms.    Patient had to put his dog down yesterday, which patient has been feeling down.      Reports 3-4 days of having more energy and inability to sit still, 2 in a row and a couple of randoms \"I feel like I had a few days of being testy than others, not angry, my previous counselor had taught me how to recognize anger feelings and able to calm down.\"  \"the day after I was exhausted because pain would kick in because I did too much physically.\"   \"Not severe as wife did not notice, I just felt like I needed to be more careful that day\" in order to avoid increased irritability or anger.  Patient reported prior behavior was up and down and now mood is more \"normalized\".    Other than mentioned symptoms, patient feels mood is stable, denies feelings of depression or anxiety.     Reports symptoms of: increase in goal-directed activity or psychomotor agitation  Denies: inflated self-esteem or grandiosity, decreased need for sleep, increased talkativeness, flight of ideas or racing thoughts, distractibility and increase in risky behavior      8/16/22:  INITIAL EVAL  Patient presents today in office with a history of bipolar depression, which patient began treatment initially in 2008 for depression, which later diagnosed with bipolar.  Patient has been taking Lamictal 200 mg every morning for approximately 12 yr.  Reports starting Cymbalta approximately 2 weeks for fibromyalgia by PCP.    Patient reports having a history of \"kaleigh\" related to medications as patient was being treated for depression.  After 1.5 to 2 yrs ex-wife had contacted psychiatrist before visit and changed diagnosis to Bipolar " "Depression, as patient was having mood fluctuations daily, higher during the morning and lower at night.  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 \"my bipolar hits normal and it was pushing me a little up, and I still have ups and downs 2-3 times per month, having a little dip, more consistent and closer to normal now.\"  Down days only last for one day.     Little dip described as lack of motivation, easy to shut down and decrease activity for that day, less talkative, gets quiet, \"I just have to crash on the couch usually. I am up and down, I am aware of my surroundings, just not as good as the day before.\"  \"I haven't had a sit in the dark day for 12 yrs, medication has done well since day 1.\"     Patient recalls prior psychiatrist had thought patient had only low days rarely, though patient was having more down low days than better days.     Patient reports weekend of 8/4/22 Thurs, Fri, and Sat wife had mentioned patient was more active than normal, pain was less, sleeping 5 hrs/night and normally 7-8 hrs/night, and patient thought the Cymbalta was working, and Sunday and Monday on the lower side of normal, patient uncertain if mood was good or hyper, \"I was up running around, I slept well just not very long.\"  On Sunday and Monday patient noted increased pain which effected mood, as patient recalls feeling depressed, \"the pain came back and I didn't have that motivation.\"  Patient reports on days mood is better as pain is not as active, though patient reports he is not surprised by the cycle of feeling good for few days then feeling down as pain varies throughout body.     Patient denies episodes or symptoms of true hypomania.     Patient voices desire to return to work, as patient is on short term disability for chronic pain and possible fibromyalgia.  Patient had missed 8-10 days of work in May and only " worked 6 days in June.  Patient reports pain is worse upon awakening which eases off as day progresses, however, with activity pain worsens later in the day.  Patient also having trouble with diarrhea for the last several months, since Feb., and recalls having trouble walking to bathroom at employer due to distance.         PHQ-9 Depression Screening  PHQ-9 Total Score: 1     Little interest or pleasure in doing things? 1-->several days   Feeling down, depressed, or hopeless? 0-->not at all   Trouble falling or staying asleep, or sleeping too much?     Feeling tired or having little energy?     Poor appetite or overeating?     Feeling bad about yourself - or that you are a failure or have let yourself or your family down?     Trouble concentrating on things, such as reading the newspaper or watching television?     Moving or speaking so slowly that other people could have noticed? Or the opposite - being so fidgety or restless that you have been moving around a lot more than usual?     Thoughts that you would be better off dead, or of hurting yourself in some way?     PHQ-9 Total Score 1     NAM-7  Feeling nervous, anxious or on edge: Not at all  Not being able to stop or control worrying: Not at all  Worrying too much about different things: Not at all  Trouble Relaxing: Not at all  Being so restless that it is hard to sit still: Not at all  Feeling afraid as if something awful might happen: Not at all  Becoming easily annoyed or irritable: Several days  NAM 7 Total Score: 1  If you checked any problems, how difficult have these problems made it for you to do your work, take care of things at home, or get along with other people: Somewhat difficult     Past Surgical History:  Past Surgical History:   Procedure Laterality Date   • COLONOSCOPY  2019?   • HERNIA REPAIR     • JOINT REPLACEMENT  June 2017    Right knee   • KNEE SURGERY Right 1985   • MI TOTAL KNEE ARTHROPLASTY N/A 6/26/2017    Procedure: RIGHT TOTAL  KNEE ARTHROPLASTY, LEFT KNEE INJECTED WITH CORTISONE;  Surgeon: Bradford Torres MD;  [facility]: [facility];  Service: Orthopedics   • TONSILLECTOMY  1970   • VASECTOMY  1998       Problem List:  Patient Active Problem List   Diagnosis   • Primary osteoarthritis of right knee   • Other chronic pain   • DDD (degenerative disc disease), lumbar   • Lumbar radiculopathy   • Testosterone deficiency   • Abreu's esophagus with dysplasia, unspecified   • Bipolar disorder (HCC)   • Depression   • High cholesterol   • Hypertension   • Low back pain   • Sleep apnea with use of continuous positive airway pressure (CPAP)   • Testicular hypofunction   • Class 3 severe obesity due to excess calories without serious comorbidity with body mass index (BMI) of 40.0 to 44.9 in adult (HCC)   • Diarrhea   • Numbness in feet   • Long term (current) use of non-steroidal anti-inflammatories (nsaid)       Allergy:   No Known Allergies     Discontinued Medications:  Medications Discontinued During This Encounter   Medication Reason   • DULoxetine (Cymbalta) 20 MG capsule Reorder       Current Medications:   Current Outpatient Medications   Medication Sig Dispense Refill   • celecoxib (CeleBREX) 200 MG capsule Take 1 capsule by mouth Daily 90 capsule 3   • cetirizine (zyrTEC) 5 MG tablet Take 5 mg by mouth Daily.     • Cholecalciferol (VITAMIN D3) 5000 UNITS capsule capsule Take 2,000 Units by mouth Daily.     • colestipol (COLESTID) 1 g tablet Take 1 tablet by mouth 2 (Two) Times a Day With Meals. 180 tablet 1   • dicyclomine (Bentyl) 10 MG capsule Take 1 capsule by mouth 4 (Four) Times a Day Before Meals & at Bedtime As Needed (diarrhea/urgency). 90 capsule 0   • DULoxetine (Cymbalta) 20 MG capsule Take 1 capsule by mouth Daily. 90 capsule 1   • fenofibrate 160 MG tablet Take 1 tablet by mouth Daily. 90 tablet 3   • fluticasone (FLONASE) 50 MCG/ACT nasal spray Use 2 sprays into the nostril(s) as directed by provider Daily. 16 g 3   •  lamoTRIgine (LaMICtal) 200 MG tablet Take 1 tablet by mouth Every Morning. 90 tablet 2   • lisinopril (PRINIVIL,ZESTRIL) 20 MG tablet Take 1 tablet by mouth Daily. 90 tablet 1   • multivitamin with minerals tablet tablet Take 1 tablet by mouth Daily.     • omeprazole (priLOSEC) 20 MG capsule Take 1 capsule by mouth Daily. 90 capsule 1   • Probiotic Product (PROBIOTIC PO) Take 1 capsule by mouth Daily. PT TO HOLD MED PRIOR TO OR     • Turmeric 500 MG capsule Take 1 capsule by mouth Daily.     • vitamin B-12 (CYANOCOBALAMIN) 1000 MCG tablet Take 5,000 mcg by mouth Daily.       No current facility-administered medications for this visit.       Past Medical History:  Past Medical History:   Diagnosis Date   • Acute URI    • Arthritis     bialteral knees   • Abreu's esophagus with dysplasia, unspecified    • Bipolar disorder (HCC)    • Callus Always   • Chronic pain disorder    • Colon polyp 2019?   • Contact with and (suspected) exposure to other viral communicable diseases    • Depression    • Deviated nasal septum    • Difficulty walking Feb 2022    Random pain   • GERD (gastroesophageal reflux disease) 2008   • High cholesterol    • History of chest pain     6 YEARS AGO, HAD STRESS TEST NORMAL   • Hypertension    • Ingrown toenail Childhood   • Low back pain    • Low testosterone level in male     HAS TESTOSTERONE PELLETS IMPLANTED   • Numbness in feet    • Obesity    • Pain in joint     UNSPECIFIED   • Plantar fasciitis 1998    No other occurrence   • Tate splints Random over years    During or after exercise   • Sinusitis    • Sleep apnea with use of continuous positive airway pressure (CPAP)    • Sleep apnea, unspecified    • SOB (shortness of breath)    • Testicular hypofunction    • Testosterone deficiency        Past Psychiatric History:  Began Treatment:2008  Diagnoses:Depression and Bipolar Depression  Psychiatrist:2 visits around 2010  Therapist:last seen approximately 8 yr ago-helpful at the  "time  Admission History:Denies  Medication Trials:Uncertain, \"tried 3-4 meds for a few months that didn't have much effect.\" prior to   Self Harm: Denies  Suicide Attempts:Denies      Substance Abuse History:   Types:Denies all, including illicit  Withdrawal Symptoms:Denies  Longest Period Sober:Not Applicable   AA: Not applicable     Social History:  Martial Status:  Employed:No awaiting Social Security, Disability  Kids:Yes or If so, how many 2  House:Lives in a house   History: Denies  Access to Guns:  Yes, \"most are put away\"    Social History     Socioeconomic History   • Marital status:    • Number of children: 2   Tobacco Use   • Smoking status: Former     Packs/day: 0.25     Years: 3.00     Pack years: 0.75     Types: Cigarettes, Pipe, Cigars     Start date: 1981     Quit date: 1984     Years since quittin.2   • Smokeless tobacco: Never   • Tobacco comments:     smoked as a teenager   Vaping Use   • Vaping Use: Never used   Substance and Sexual Activity   • Alcohol use: Yes     Alcohol/week: 3.0 standard drinks     Types: 3 Cans of beer per week     Comment: socially   • Drug use: Never     Types: Marijuana     Comment: in college   • Sexual activity: Yes     Partners: Female     Birth control/protection: Surgical, Post-menopausal       Family History:   Suicide Attempts: Denies  Suicide Completions:Denies      Family History   Problem Relation Age of Onset   • Diabetes Mother    • Hypertension Mother    • Depression Father    • Colon polyps Father    • Stroke Father    • Skin cancer Father         unknown type   • Depression Sister    • Hypertension Sister    • Rheum arthritis Sister    • Arthritis Sister    • ADD / ADHD Nephew    • Malig Hyperthermia Neg Hx        Developmental History:   Born: [place]  Siblings:1 sister  Childhood: Denies Abuse  High School:Completed  College:some    Mental Status Exam:   Hygiene:   good  Cooperation:  Cooperative  Eye " Contact:  Good  Psychomotor Behavior:  Appropriate  Affect:  Appropriate  Mood: euthymic  Speech:  Normal  Thought Process:  Goal directed  Thought Content:  Mood congruent  Suicidal:  None  Homicidal:  None  Hallucinations:  None  Delusion:  None  Memory:  Intact  Orientation:  Person, Place, Time and Situation  Reliability:  good  Insight:  Good  Judgement:  Good  Impulse Control:  Good  Physical/Medical Issues:  Yes HTN,HLD,Obesity,Barretts esophaagus,LBP,OA,Lumbar DDD,JEANNIE-CPAP,Chronic leg pain, paresthesia dorothy feet, testoterone def.     Review of Systems:  Review of Systems   Constitutional: Negative for diaphoresis, fatigue and fever.   HENT: Negative for drooling.    Eyes: Negative for visual disturbance.   Respiratory: Negative for cough, chest tightness and shortness of breath.    Cardiovascular: Negative for chest pain, palpitations and leg swelling.   Gastrointestinal: Negative for abdominal pain, nausea and vomiting.   Endocrine: Negative for cold intolerance and heat intolerance.   Genitourinary: Negative for difficulty urinating.   Musculoskeletal: Positive for joint swelling and myalgias. Negative for back pain and neck pain.   Allergic/Immunologic: Negative for immunocompromised state.   Neurological: Negative for dizziness, seizures, syncope, speech difficulty, weakness, light-headedness, numbness and headaches.   Psychiatric/Behavioral: Negative for confusion, decreased concentration, hallucinations, self-injury, sleep disturbance and suicidal ideas. The patient is not nervous/anxious and is not hyperactive.          Physical Exam:  Physical Exam  Psychiatric:         Attention and Perception: Attention and perception normal.         Mood and Affect: Mood and affect normal.         Speech: Speech normal.         Behavior: Behavior normal. Behavior is cooperative.         Thought Content: Thought content normal. Thought content does not include suicidal ideation. Thought content does not include  "suicidal plan.         Cognition and Memory: Cognition and memory normal.         Judgment: Judgment normal.         Vital Signs:   /75   Pulse 76   Ht 175.3 cm (69.02\")   Wt 133 kg (293 lb)   BMI 43.24 kg/m²      Lab Results:   Lab on 10/18/2022   Component Date Value Ref Range Status   • Hepatitis C Ab 10/18/2022 Non-Reactive  Non-Reactive Final   • Total Cholesterol 10/18/2022 196  0 - 200 mg/dL Final   • Triglycerides 10/18/2022 312 (H)  0 - 150 mg/dL Final   • HDL Cholesterol 10/18/2022 26 (L)  40 - 60 mg/dL Final   • LDL Cholesterol  10/18/2022 115 (H)  0 - 100 mg/dL Final   • VLDL Cholesterol 10/18/2022 55 (H)  5 - 40 mg/dL Final   • LDL/HDL Ratio 10/18/2022 4.14   Final   • Glucose 10/18/2022 108 (H)  65 - 99 mg/dL Final   • BUN 10/18/2022 15  6 - 20 mg/dL Final   • Creatinine 10/18/2022 1.09  0.76 - 1.27 mg/dL Final   • Sodium 10/18/2022 139  136 - 145 mmol/L Final   • Potassium 10/18/2022 3.9  3.5 - 5.2 mmol/L Final   • Chloride 10/18/2022 102  98 - 107 mmol/L Final   • CO2 10/18/2022 26.6  22.0 - 29.0 mmol/L Final   • Calcium 10/18/2022 9.5  8.6 - 10.5 mg/dL Final   • Total Protein 10/18/2022 6.8  6.0 - 8.5 g/dL Final   • Albumin 10/18/2022 4.30  3.50 - 5.20 g/dL Final   • ALT (SGPT) 10/18/2022 39  1 - 41 U/L Final   • AST (SGOT) 10/18/2022 27  1 - 40 U/L Final   • Alkaline Phosphatase 10/18/2022 76  39 - 117 U/L Final   • Total Bilirubin 10/18/2022 0.3  0.0 - 1.2 mg/dL Final   • Globulin 10/18/2022 2.5  gm/dL Final   • A/G Ratio 10/18/2022 1.7  g/dL Final   • BUN/Creatinine Ratio 10/18/2022 13.8  7.0 - 25.0 Final   • Anion Gap 10/18/2022 10.4  5.0 - 15.0 mmol/L Final   • eGFR 10/18/2022 79.7  >60.0 mL/min/1.73 Final    National Kidney Foundation and American Society of Nephrology (ASN) Task Force recommended calculation based on the Chronic Kidney Disease Epidemiology Collaboration (CKD-EPI) equation refit without adjustment for race.   • WBC 10/18/2022 4.90  3.40 - 10.80 10*3/mm3 Final   • RBC " 10/18/2022 5.09  4.14 - 5.80 10*6/mm3 Final   • Hemoglobin 10/18/2022 14.7  13.0 - 17.7 g/dL Final   • Hematocrit 10/18/2022 45.1  37.5 - 51.0 % Final   • MCV 10/18/2022 88.6  79.0 - 97.0 fL Final   • MCH 10/18/2022 28.9  26.6 - 33.0 pg Final   • MCHC 10/18/2022 32.6  31.5 - 35.7 g/dL Final   • RDW 10/18/2022 13.1  12.3 - 15.4 % Final   • RDW-SD 10/18/2022 42.2  37.0 - 54.0 fl Final   • MPV 10/18/2022 10.5  6.0 - 12.0 fL Final   • Platelets 10/18/2022 222  140 - 450 10*3/mm3 Final   • Vitamin B-12 10/18/2022 >2,000 (H)  211 - 946 pg/mL Final   • Hemoglobin A1C 10/18/2022 6.20 (H)  4.80 - 5.60 % Final   Lab on 06/24/2022   Component Date Value Ref Range Status   • Class Description 06/24/2022 Comment   Final        Levels of Specific IgE       Class  Description of Class      ---------------------------  -----  --------------------                     < 0.10         0         Negative             0.10 -    0.31         0/I       Equivocal/Low             0.32 -    0.55         I         Low             0.56 -    1.40         II        Moderate             1.41 -    3.90         III       High             3.91 -   19.00         IV        Very High            19.01 -  100.00         V         Very High                    >100.00         VI        Very High   • Egg White 06/24/2022 <0.10  Class 0 kU/L Final   • Peanut 06/24/2022 0.15 (A)  Class 0/I kU/L Final   • Soybean 06/24/2022 0.11 (A)  Class 0/I kU/L Final   • Milk, Cow's 06/24/2022 0.76 (A)  Class II kU/L Final   • Clams 06/24/2022 0.38 (A)  Class I kU/L Final   • Shrimp 06/24/2022 0.21 (A)  Class 0/I kU/L Final   • [place] 06/24/2022 <0.10  Class 0 kU/L Final   • CodFish 06/24/2022 <0.10  Class 0 kU/L Final   • Scallop 06/24/2022 0.45 (A)  Class I kU/L Final   • Wheat 06/24/2022 0.14 (A)  Class 0/I kU/L Final   • Corn 06/24/2022 0.24 (A)  Class 0/I kU/L Final   • Sesame Seed 06/24/2022 0.28 (A)  Class 0/I kU/L Final       EKG Results:  No orders to display        Imaging Results:  No Images in the past 120 days found..      Assessment & Plan   Diagnoses and all orders for this visit:    1. Bipolar disorder, in partial remission, most recent episode depressed (HCC) (Primary)  -     DULoxetine (Cymbalta) 20 MG capsule; Take 1 capsule by mouth Daily.  Dispense: 90 capsule; Refill: 1    2. Other chronic pain  -     DULoxetine (Cymbalta) 20 MG capsule; Take 1 capsule by mouth Daily.  Dispense: 90 capsule; Refill: 1        Visit Diagnoses:    ICD-10-CM ICD-9-CM   1. Bipolar disorder, in partial remission, most recent episode depressed (HCC)  F31.75 296.55   2. Other chronic pain  G89.29 338.29       PLAN:  1. Safety: No acute safety concerns  2. Therapy: Declines considering and wishes to discuss further at next visit after insurance determined.   3. Risk Assessment: Risk of self-harm acutely is low.  Risk factors include mood disorder, access to guns/weapons, and recent psychosocial stressors (pandemic). Protective factors include no family history, no present SI, no history of suicide attempts or self-harm in the past, minimal AODA, healthcare seeking, future orientation, willingness to engage in care.  Risk of self-harm chronically is also low, but could be further elevated in the event of treatment noncompliance and/or AODA.  4. Meds: Continue Lamictal 200 mg by mouth daily in the morning to target mood.    Continue Cymbalta 20 mg by mouth daily in the morning to target mood and chronic pain. Tolerating well, noted improvement with mood.  Refilled today for 90 days with 1 refill.     5.   Labs: n/a     Symptoms of bipolar depression, are under good control with current medication regimen.  Anxiety has decreased with new knowledge received per ID providers.  Patient is awaiting for response from social security and disability.    Patient to contact provider if symptoms worsen or fail to improve.     10/6/22:   Continue Lamictal 200 mg by mouth daily in the morning to  target mood.    Continue Cymbalta 20 mg by mouth daily in the morning to target mood and chronic pain. Tolerating well, noted improvement with mood.     Symptoms described today of increased energy suspect related to Cymbalta effectiveness as patient able to complete more physical tasks and other than having a few days of increased energy patient denied other negative symptoms. Will continue same dose of Cymbalta for now and reassess at next appointment.   Patient to contact provider if symptoms worsen or fail to improve.     8/16/22:   -Declines therapy   Continue Lamictal 200 mg by mouth daily in the morning to target mood. Risks, benefits, alternatives discussed with patient including rash, rebound depressive or manic symptoms if prompt discontinuation, GI upset, agitation, sedation/falls risk.  After discussion of these risks and benefits, patient voiced understanding and agreed to proceed. Refilled today  Continue Cymbalta 20 mg by mouth daily in the morning to target mood and chronic pain as previously started by PCP on 8/3/22.  Discussed all risks, benefits, alternatives, and side effects of Duloxetine including but not limited to GI upset, sexual dysfunction, bleeding risk, seizure risk, weight loss, insomnia, diaphoresis, drowsiness, headache, dizziness, fatigue, activation of kaleigh or hypomania, increased fragility fracture risk, hyponatremia, increased BP, hepatotoxicity, ocular effects, withdrawal syndrome following abrupt discontinuation, serotonin syndrome, and activation of suicidal ideation and behavior. Patient educated on the need to practice safe sex while taking this medication. Discussed the need for patient to immediately call the office for any new or worsening symptoms, such as worsening depression; feeling nervous or restless; suicidal thoughts or actions; or other changes in mood or behavior, and all other concerns. Patient educated on medication compliance and the risks of suddenly  stopping this medication or missing doses. Patient verbalized understanding and is agreeable to taking Duloxetine. Addressed all questions and concerns.     Patient presentation seems most consistent with bipolar depression with mood fluctuations appearing to be more related to physical pain.  As patient reports having 2-3 days per month of feeling low which last for 1 day.  Will continue current regimen as patient has been taking Lamictal 200 mg for 12 yrs which has been effective.    Patient to contact provider if symptoms worsen or fail to improve.        Patient screened positive for depression based on a PHQ-9 score of 1 on 12/1/2022. Follow-up recommendations include: Prescribed antidepressant medication treatment and Suicide Risk Assessment performed.       TREATMENT PLAN/GOALS: Continue supportive psychotherapy efforts and medications as indicated. Treatment and medication options discussed during today's visit. Patient acknowledged and verbally consented to continue with current treatment plan and was educated on the importance of compliance with treatment and follow-up appointments.    MEDICATION ISSUES:  DEANDRE reviewed as expected.  Discussed medication options and treatment plan of prescribed medication as well as the risks, benefits, and side effects including potential falls, possible impaired driving and metabolic adversities among others. Patient is agreeable to call the office with any worsening of symptoms or onset of side effects. Patient is agreeable to call 911 or go to the nearest ER should he/she begin having SI/HI. No medication side effects or related complaints today.     MEDS ORDERED DURING VISIT:  New Medications Ordered This Visit   Medications   • DULoxetine (Cymbalta) 20 MG capsule     Sig: Take 1 capsule by mouth Daily.     Dispense:  90 capsule     Refill:  1       Return in about 2 months (around 2/1/2023) for medication check.         I spent 26 minutes caring for Anthony on this  date of service. This time includes time spent by me in the following activities: preparing for the visit, reviewing tests, obtaining and/or reviewing a separately obtained history, performing a medically appropriate examination and/or evaluation, counseling and educating the patient/family/caregiver, ordering medications, tests, or procedures, referring and communicating with other health care professionals and documenting information in the medical record.      This document has been electronically signed by RAY Yates  December 1, 2022 08:30 EST      Part of this note may be an electronic transcription/translation of spoken language to printed text using the Dragon Dictation System.

## 2023-04-18 ENCOUNTER — LAB (OUTPATIENT)
Dept: LAB | Facility: HOSPITAL | Age: 58
End: 2023-04-18
Payer: COMMERCIAL

## 2023-04-18 ENCOUNTER — OFFICE VISIT (OUTPATIENT)
Dept: FAMILY MEDICINE CLINIC | Age: 58
End: 2023-04-18
Payer: COMMERCIAL

## 2023-04-18 VITALS
DIASTOLIC BLOOD PRESSURE: 81 MMHG | WEIGHT: 293.2 LBS | HEIGHT: 69 IN | OXYGEN SATURATION: 96 % | SYSTOLIC BLOOD PRESSURE: 125 MMHG | BODY MASS INDEX: 43.43 KG/M2 | HEART RATE: 67 BPM

## 2023-04-18 DIAGNOSIS — R19.7 DIARRHEA, UNSPECIFIED TYPE: ICD-10-CM

## 2023-04-18 DIAGNOSIS — R73.03 PREDIABETES: ICD-10-CM

## 2023-04-18 DIAGNOSIS — R73.03 PREDIABETES: Primary | ICD-10-CM

## 2023-04-18 DIAGNOSIS — L57.0 ACTINIC KERATOSIS: ICD-10-CM

## 2023-04-18 DIAGNOSIS — I10 PRIMARY HYPERTENSION: ICD-10-CM

## 2023-04-18 DIAGNOSIS — Z12.83 SCREENING EXAM FOR SKIN CANCER: ICD-10-CM

## 2023-04-18 DIAGNOSIS — Z12.5 SCREENING FOR MALIGNANT NEOPLASM OF PROSTATE: ICD-10-CM

## 2023-04-18 DIAGNOSIS — E66.01 CLASS 3 SEVERE OBESITY DUE TO EXCESS CALORIES WITHOUT SERIOUS COMORBIDITY WITH BODY MASS INDEX (BMI) OF 40.0 TO 44.9 IN ADULT: ICD-10-CM

## 2023-04-18 DIAGNOSIS — Z13.6 SCREENING FOR CARDIOVASCULAR CONDITION: ICD-10-CM

## 2023-04-18 DIAGNOSIS — E78.00 HIGH CHOLESTEROL: ICD-10-CM

## 2023-04-18 DIAGNOSIS — M17.11 PRIMARY OSTEOARTHRITIS OF RIGHT KNEE: ICD-10-CM

## 2023-04-18 LAB
ALBUMIN SERPL-MCNC: 4.4 G/DL (ref 3.5–5.2)
ALBUMIN/GLOB SERPL: 1.7 G/DL
ALP SERPL-CCNC: 81 U/L (ref 39–117)
ALT SERPL W P-5'-P-CCNC: 33 U/L (ref 1–41)
ANION GAP SERPL CALCULATED.3IONS-SCNC: 13.1 MMOL/L (ref 5–15)
AST SERPL-CCNC: 28 U/L (ref 1–40)
BILIRUB SERPL-MCNC: 0.4 MG/DL (ref 0–1.2)
BUN SERPL-MCNC: 15 MG/DL (ref 6–20)
BUN/CREAT SERPL: 14.7 (ref 7–25)
CALCIUM SPEC-SCNC: 10 MG/DL (ref 8.6–10.5)
CHLORIDE SERPL-SCNC: 105 MMOL/L (ref 98–107)
CHOLEST SERPL-MCNC: 193 MG/DL (ref 0–200)
CO2 SERPL-SCNC: 23.9 MMOL/L (ref 22–29)
CREAT SERPL-MCNC: 1.02 MG/DL (ref 0.76–1.27)
EGFRCR SERPLBLD CKD-EPI 2021: 86.3 ML/MIN/1.73
GLOBULIN UR ELPH-MCNC: 2.6 GM/DL
GLUCOSE SERPL-MCNC: 120 MG/DL (ref 65–99)
HBA1C MFR BLD: 5.9 % (ref 4.8–5.6)
HDLC SERPL-MCNC: 23 MG/DL (ref 40–60)
LDLC SERPL CALC-MCNC: 118 MG/DL (ref 0–100)
LDLC/HDLC SERPL: 4.81 {RATIO}
POTASSIUM SERPL-SCNC: 4.1 MMOL/L (ref 3.5–5.2)
PROT SERPL-MCNC: 7 G/DL (ref 6–8.5)
PSA SERPL-MCNC: 0.49 NG/ML (ref 0–4)
SODIUM SERPL-SCNC: 142 MMOL/L (ref 136–145)
TRIGL SERPL-MCNC: 297 MG/DL (ref 0–150)
VLDLC SERPL-MCNC: 52 MG/DL (ref 5–40)

## 2023-04-18 PROCEDURE — 80053 COMPREHEN METABOLIC PANEL: CPT

## 2023-04-18 PROCEDURE — 83036 HEMOGLOBIN GLYCOSYLATED A1C: CPT

## 2023-04-18 PROCEDURE — 36415 COLL VENOUS BLD VENIPUNCTURE: CPT

## 2023-04-18 PROCEDURE — 80061 LIPID PANEL: CPT

## 2023-04-18 PROCEDURE — G0103 PSA SCREENING: HCPCS

## 2023-04-18 RX ORDER — FENOFIBRATE 160 MG/1
160 TABLET ORAL DAILY
Qty: 90 TABLET | Refills: 3 | Status: SHIPPED | OUTPATIENT
Start: 2023-04-18

## 2023-04-18 RX ORDER — CELECOXIB 200 MG/1
200 CAPSULE ORAL DAILY
Qty: 90 CAPSULE | Refills: 3 | Status: SHIPPED | OUTPATIENT
Start: 2023-04-18 | End: 2023-07-17

## 2023-04-18 RX ORDER — LISINOPRIL 20 MG/1
20 TABLET ORAL DAILY
Qty: 90 TABLET | Refills: 1 | Status: SHIPPED | OUTPATIENT
Start: 2023-04-18

## 2023-04-18 NOTE — PROGRESS NOTES
Chief Complaint  Anthony Brown presents to [facility] for Hypertension (6 month follow up ) and Hyperlipidemia      Subjective     History of Present Illness  Jeremy presents for follow up on hypertension.  Compliance with medication : lisinopril (generic) as prescribed   No new concerns or problems to report.    Needing a refill of Omeprazole and Celebrex  Taking as prescribed.  No problems or concerns.        Jeremy presents today for follow up on hyperlipidemia.  Previous values: Lab Results       Component                Value               Date                       CHOL                     196                 10/18/2022                 CHLPL                    183                 11/22/2019                 TRIG                     312 (H)             10/18/2022                 HDL                      26 (L)              10/18/2022                 LDL                      115 (H)             10/18/2022           ;    Current CVD 10yr risk is The 10-year ASCVD risk score (Karen DEVLIN, et al., 2019) is: 11.5%    Values used to calculate the score:      Age: 57 years      Sex: Male      Is Non- : No      Diabetic: No      Tobacco smoker: No      Systolic Blood Pressure: 125 mmHg      Is BP treated: Yes      HDL Cholesterol: 26 mg/dL      Total Cholesterol: 196 mg/dL ;    Jeremy reports compliant with medication which is fenofibrate    No side effects reported from this medication .   No new concerns to discuss today.    DIARRHEA around easter, stools have gotten better.  He did stop the pills and is seeing how he is affected.  He feels this is from covid 14 or more months ago.  He is doing ok   Holding the colestipol and not having to use the Bentyl    He is wishing for a referral to derm- he has family history of precancerous   He does want his actinic keratosis diffusely and just wants an overall skin check.  He also has a spot on his left forehead wants  looked at    Last visit, labs indicated Prediabetes.  He has not made any dietary changes.  We discussed options should this number be in diabetes range(his mother is diabetic)  We will defer metformin due to his chronic diarrhea and risk for this.  He would like to try injectable to assist with possible weight loss or whatever his insurance will cover.          Assessment and Plan       Diagnoses and all orders for this visit:    1. Prediabetes (Primary)  Comments:  repeat labs today  discussed medication will avoid Metformin given chronic diarrhea  Orders:  -     Hemoglobin A1c; Future    2. Primary osteoarthritis of right knee  Comments:  continue current treatment.  continue with weight loss efforts and follow up with Dr. Eduardo Llanos as recommended   Orders:  -     celecoxib (CeleBREX) 200 MG capsule; Take 1 capsule by mouth Daily  Dispense: 90 capsule; Refill: 3    3. High cholesterol  Comments:  discused CVD risk   consider adding statin if remaining elevated  Orders:  -     fenofibrate 160 MG tablet; Take 1 tablet by mouth Daily.  Dispense: 90 tablet; Refill: 3    4. Primary hypertension  Comments:  Well controlled on Medication  continue current treatment   Orders:  -     lisinopril (PRINIVIL,ZESTRIL) 20 MG tablet; Take 1 tablet by mouth Daily.  Dispense: 90 tablet; Refill: 1    5. Screening for cardiovascular condition  -     Comprehensive metabolic panel; Future  -     Lipid panel; Future    6. Screening for malignant neoplasm of prostate  -     PSA SCREENING; Future    7. Diarrhea, unspecified type  Comments:  stable for now  continue medicatin adjustments as currently working     8. Screening exam for skin cancer  -     Ambulatory Referral to Dermatology    9. Actinic keratosis  -     Ambulatory Referral to Dermatology    10. Class 3 severe obesity due to excess calories without serious comorbidity with body mass index (BMI) of 40.0 to 44.9 in adult  Comments:  weight loss may improve chronic conditions  "including prediabetes, HLD, HTN, arthritis of knee        Follow Up   Return in about 6 months (around 10/18/2023) for Recheck, Annual physical.      New Medications Ordered This Visit   Medications   • celecoxib (CeleBREX) 200 MG capsule     Sig: Take 1 capsule by mouth Daily     Dispense:  90 capsule     Refill:  3   • fenofibrate 160 MG tablet     Sig: Take 1 tablet by mouth Daily.     Dispense:  90 tablet     Refill:  3   • lisinopril (PRINIVIL,ZESTRIL) 20 MG tablet     Sig: Take 1 tablet by mouth Daily.     Dispense:  90 tablet     Refill:  1       Medications Discontinued During This Encounter   Medication Reason   • colestipol (COLESTID) 1 g tablet *Therapy completed   • dicyclomine (Bentyl) 10 MG capsule *Therapy completed   • celecoxib (CeleBREX) 200 MG capsule Reorder   • fenofibrate 160 MG tablet Reorder   • lisinopril (PRINIVIL,ZESTRIL) 20 MG tablet Reorder            Review of Systems    Objective     Vitals:    04/18/23 0752   BP: 125/81   BP Location: Left arm   Patient Position: Sitting   Cuff Size: Large Adult   Pulse: 67   SpO2: 96%   Weight: 133 kg (293 lb 3.2 oz)   Height: 175.3 cm (69.02\")     Body mass index is 43.27 kg/m².     Physical Exam  Vitals reviewed.   Constitutional:       Appearance: Normal appearance. He is obese.   HENT:      Head: Normocephalic.   Eyes:      Pupils: Pupils are equal, round, and reactive to light.   Cardiovascular:      Rate and Rhythm: Normal rate and regular rhythm.      Heart sounds: No murmur heard.  Pulmonary:      Effort: Pulmonary effort is normal.      Breath sounds: Normal breath sounds.   Musculoskeletal:         General: Normal range of motion.   Neurological:      Mental Status: He is alert.   Psychiatric:         Mood and Affect: Mood normal.         Behavior: Behavior normal.            Result Review                       No Known Allergies   Past Medical History:   Diagnosis Date   • Acute URI    • Arthritis     bialteral knees   • Abreu's " esophagus with dysplasia, unspecified    • Bipolar disorder    • Callus Always   • Chronic pain disorder    • Colon polyp 2019?   • Contact with and (suspected) exposure to other viral communicable diseases    • Depression    • Deviated nasal septum    • Difficulty walking Feb 2022    Random pain   • GERD (gastroesophageal reflux disease) 2008   • High cholesterol    • History of chest pain     6 YEARS AGO, HAD STRESS TEST NORMAL   • Hypertension    • Ingrown toenail Childhood   • Low back pain    • Low testosterone level in male     HAS TESTOSTERONE PELLETS IMPLANTED   • Numbness in feet    • Obesity    • Pain in joint     UNSPECIFIED   • Plantar fasciitis 1998    No other occurrence   • Tate splints Random over years    During or after exercise   • Sinusitis    • Sleep apnea with use of continuous positive airway pressure (CPAP)    • Sleep apnea, unspecified    • SOB (shortness of breath)    • Testicular hypofunction    • Testosterone deficiency      Current Outpatient Medications   Medication Sig Dispense Refill   • celecoxib (CeleBREX) 200 MG capsule Take 1 capsule by mouth Daily 90 capsule 3   • cetirizine (zyrTEC) 5 MG tablet Take 1 tablet by mouth Daily.     • Cholecalciferol (VITAMIN D3) 5000 UNITS capsule capsule Take 2,000 Units by mouth Daily.     • DULoxetine (Cymbalta) 20 MG capsule Take 1 capsule by mouth Daily. 90 capsule 1   • fenofibrate 160 MG tablet Take 1 tablet by mouth Daily. 90 tablet 3   • fluticasone (FLONASE) 50 MCG/ACT nasal spray Use 2 sprays into the nostril(s) as directed by provider Daily. 16 g 3   • lamoTRIgine (LaMICtal) 200 MG tablet Take 1 tablet by mouth Every Morning. 90 tablet 2   • lisinopril (PRINIVIL,ZESTRIL) 20 MG tablet Take 1 tablet by mouth Daily. 90 tablet 1   • multivitamin with minerals tablet tablet Take 1 tablet by mouth Daily.     • omeprazole (priLOSEC) 20 MG capsule Take 1 capsule by mouth Daily. 90 capsule 1   • Probiotic Product (PROBIOTIC PO) Take 1 capsule by  mouth Daily. PT TO HOLD MED PRIOR TO OR     • Turmeric 500 MG capsule Take 1 capsule by mouth Daily.     • vitamin B-12 (CYANOCOBALAMIN) 1000 MCG tablet Take 5 tablets by mouth Daily.       No current facility-administered medications for this visit.     Past Surgical History:   Procedure Laterality Date   • COLONOSCOPY  2019?   • HERNIA REPAIR     • JOINT REPLACEMENT  June 2017    Right knee   • KNEE SURGERY Right 1985   • MO ARTHRP KNE CONDYLE&PLATU MEDIAL&LAT COMPARTMENTS N/A 6/26/2017    Procedure: RIGHT TOTAL KNEE ARTHROPLASTY, LEFT KNEE INJECTED WITH CORTISONE;  Surgeon: Bradford Torres MD;  Location: [facility];  Service: Orthopedics   • TONSILLECTOMY  1970   • VASECTOMY  1998      There are no preventive care reminders to display for this patient.   Immunization History   Administered Date(s) Administered   • COVID-19 (JAGDEEP) 05/14/2021   • TD Preservative Free 03/10/2017         Part of this note may be an electronic transcription/translation of spoken language to printed   text using the Dragon Dictation System.      RAY Morrell

## 2023-04-24 DIAGNOSIS — F31.32 BIPOLAR AFFECTIVE DISORDER, CURRENTLY DEPRESSED, MODERATE: ICD-10-CM

## 2023-04-24 DIAGNOSIS — G89.29 OTHER CHRONIC PAIN: ICD-10-CM

## 2023-04-24 DIAGNOSIS — F31.75 BIPOLAR DISORDER, IN PARTIAL REMISSION, MOST RECENT EPISODE DEPRESSED: ICD-10-CM

## 2023-04-24 RX ORDER — MONTELUKAST SODIUM 4 MG/1
TABLET, CHEWABLE ORAL
Qty: 180 TABLET | Refills: 1 | Status: SHIPPED | OUTPATIENT
Start: 2023-04-24

## 2023-04-25 RX ORDER — LAMOTRIGINE 200 MG/1
200 TABLET ORAL EVERY MORNING
Qty: 90 TABLET | Refills: 1 | OUTPATIENT
Start: 2023-04-25 | End: 2024-01-20

## 2023-04-25 RX ORDER — DULOXETIN HYDROCHLORIDE 20 MG/1
CAPSULE, DELAYED RELEASE ORAL
Qty: 90 CAPSULE | Refills: 1 | OUTPATIENT
Start: 2023-04-25

## 2023-04-25 NOTE — TELEPHONE ENCOUNTER
Should have adequate supply through May 2023 with refills previously ordered, patient will need to be seen as well.

## 2023-04-29 DIAGNOSIS — I10 PRIMARY HYPERTENSION: ICD-10-CM

## 2023-05-01 RX ORDER — LISINOPRIL 20 MG/1
TABLET ORAL
Qty: 90 TABLET | Refills: 1 | Status: SHIPPED | OUTPATIENT
Start: 2023-05-01

## 2023-05-22 DIAGNOSIS — J34.89 NASAL OBSTRUCTION: ICD-10-CM

## 2023-05-22 DIAGNOSIS — J30.1 SEASONAL ALLERGIC RHINITIS DUE TO POLLEN: ICD-10-CM

## 2023-05-23 RX ORDER — FLUTICASONE PROPIONATE 50 MCG
2 SPRAY, SUSPENSION (ML) NASAL DAILY
Qty: 16 G | Refills: 3 | Status: SHIPPED | OUTPATIENT
Start: 2023-05-23

## 2023-08-07 DIAGNOSIS — K22.719 BARRETT'S ESOPHAGUS WITH DYSPLASIA, UNSPECIFIED: ICD-10-CM

## 2023-08-07 RX ORDER — OMEPRAZOLE 20 MG/1
20 CAPSULE, DELAYED RELEASE ORAL DAILY
Qty: 90 CAPSULE | Refills: 0 | Status: SHIPPED | OUTPATIENT
Start: 2023-08-07

## 2023-08-07 RX ORDER — CETIRIZINE HYDROCHLORIDE 10 MG/1
10 TABLET ORAL DAILY
Qty: 90 TABLET | Refills: 0 | Status: SHIPPED | OUTPATIENT
Start: 2023-08-07

## 2023-08-16 ENCOUNTER — OFFICE VISIT (OUTPATIENT)
Dept: FAMILY MEDICINE CLINIC | Age: 58
End: 2023-08-16
Payer: COMMERCIAL

## 2023-08-16 VITALS
HEART RATE: 69 BPM | SYSTOLIC BLOOD PRESSURE: 132 MMHG | WEIGHT: 296.2 LBS | BODY MASS INDEX: 43.87 KG/M2 | HEIGHT: 69 IN | DIASTOLIC BLOOD PRESSURE: 77 MMHG | OXYGEN SATURATION: 96 % | TEMPERATURE: 98.6 F

## 2023-08-16 DIAGNOSIS — R19.7 DIARRHEA, UNSPECIFIED TYPE: ICD-10-CM

## 2023-08-16 DIAGNOSIS — R07.89 LEFT-SIDED CHEST WALL PAIN: ICD-10-CM

## 2023-08-16 DIAGNOSIS — G62.9 NEUROPATHY: Primary | ICD-10-CM

## 2023-08-16 LAB
AMPHET+METHAMPHET UR QL: NEGATIVE
AMPHETAMINES UR QL: NEGATIVE
BARBITURATES UR QL SCN: NEGATIVE
BENZODIAZ UR QL SCN: NEGATIVE
BUPRENORPHINE SERPL-MCNC: NEGATIVE NG/ML
CANNABINOIDS SERPL QL: NEGATIVE
COCAINE UR QL: NEGATIVE
EXPIRATION DATE: NORMAL
Lab: NORMAL
MDMA UR QL SCN: NEGATIVE
METHADONE UR QL SCN: NEGATIVE
OPIATES UR QL: NEGATIVE
OXYCODONE UR QL SCN: NEGATIVE
PCP UR QL SCN: NEGATIVE

## 2023-08-16 PROCEDURE — 99214 OFFICE O/P EST MOD 30 MIN: CPT | Performed by: NURSE PRACTITIONER

## 2023-08-16 PROCEDURE — 80305 DRUG TEST PRSMV DIR OPT OBS: CPT | Performed by: NURSE PRACTITIONER

## 2023-08-16 RX ORDER — DICYCLOMINE HYDROCHLORIDE 10 MG/1
10 CAPSULE ORAL
Qty: 120 CAPSULE | Refills: 0 | Status: SHIPPED | OUTPATIENT
Start: 2023-08-16

## 2023-08-16 NOTE — PROGRESS NOTES
Chief Complaint  Anthony Brown presents to [facility] for Foot Pain (Both feet X 18 months  /Pt wanting to discuss pain management options ) and Generalized Body Aches (Chronic )      Subjective     History of Present Illness  Jeremy is here today with continuing concerns over his feet.  He has been to podiatry He has tried different treatment modalities over time but this is now affecting his sleep and his day to day activities.  He has been on gabapentin in the past and is willing to try this again.  He did see podiatry and have a thorough work up.  Celebrex has not benefit to this pain      He also reports that there is pain under his left axilla which has been present for 25 years or more.  He is on celebrex daily but this is not helping.  He feels that this feels like an ice pick under his arm and twisting.  There is no recollected injury when with started.  He has had a cardiac work up in the past-negative.  He may have reached somewhere and triggered this.  He is unable to determine position eliciting symptoms. No swelling  He did have a CT years ago and did not see anything specifically causing the pain.  He did have an injection which did not help.  He reports that aleve is the most beneficial He is ready to move forward to find out if there is a long term fix.  It is located anteriorly under the left arm and he is able to directly pinpoint the pain location.  It will get debilitating that it draws his arm forward . He has never had MRI of this area    He is also continuing with diarrhea- he still has urgency.  He is taking colestipol, but continues to have urgency but his stool is a little more firm.  He stopped taking bentyl in the past but is willing to try this again, along with colestipol.            Assessment and Plan       Diagnoses and all orders for this visit:    1. Neuropathy (Primary)  Comments:  will try gabapentin and plan to escalate dosing based  "onresponse.  Follow up in 4-6 weeks  Orders:  -     POC Urine Drug Screen [facility]  -     gabapentin (NEURONTIN) 100 MG capsule; Take 1-2 capsules by mouth At Night As Needed (leg pain).  Dispense: 60 capsule; Refill: 1    2. Diarrhea, unspecified type  Comments:  continue colestipol, add bentyl PRN and follow up if persists  Orders:  -     dicyclomine (BENTYL) 10 MG capsule; Take 1 capsule by mouth 4 (Four) Times a Day Before Meals & at Bedtime.  Dispense: 120 capsule; Refill: 0    3. Left-sided chest wall pain  Comments:  We will get imaging of his chest wall at his request-may request PT if  CT unrevealing  Orders:  -     CT Chest Without Contrast Diagnostic; Future        Follow Up   Return for Pending imaging results.      New Medications Ordered This Visit   Medications    dicyclomine (BENTYL) 10 MG capsule     Sig: Take 1 capsule by mouth 4 (Four) Times a Day Before Meals & at Bedtime.     Dispense:  120 capsule     Refill:  0    gabapentin (NEURONTIN) 100 MG capsule     Sig: Take 1-2 capsules by mouth At Night As Needed (leg pain).     Dispense:  60 capsule     Refill:  1       There are no discontinued medications.         Review of Systems    Objective     Vitals:    08/16/23 1057   BP: 132/77   BP Location: Right arm   Patient Position: Sitting   Cuff Size: Adult   Pulse: 69   Temp: 98.6 øF (37 øC)   TempSrc: Oral   SpO2: 96%   Weight: 134 kg (296 lb 3.2 oz)   Height: 175.3 cm (69.02\")     Body mass index is 43.72 kg/mý.     Physical Exam  Vitals reviewed.   Constitutional:       Appearance: Normal appearance. He is obese.   HENT:      Head: Normocephalic.   Eyes:      Pupils: Pupils are equal, round, and reactive to light.   Cardiovascular:      Rate and Rhythm: Normal rate and regular rhythm.      Heart sounds: No murmur heard.  Pulmonary:      Effort: Pulmonary effort is normal.      Breath sounds: Normal breath sounds.   Chest:      Chest wall: No swelling or tenderness.   Musculoskeletal:    "      General: Normal range of motion.      Left shoulder: No tenderness or crepitus. Normal range of motion.   Neurological:      Mental Status: He is alert.   Psychiatric:         Mood and Affect: Mood normal.         Behavior: Behavior normal.          Result Review                       No Known Allergies   Past Medical History:   Diagnosis Date    Acute URI     Arthritis     bialteral knees    Abreu's esophagus with dysplasia, unspecified     Bipolar disorder     Callus Always    Chronic pain disorder     Colon polyp 2019?    Contact with and (suspected) exposure to other viral communicable diseases     Depression     Deviated nasal septum     Difficulty walking Feb 2022    Random pain    GERD (gastroesophageal reflux disease) 2008    High cholesterol     History of chest pain     6 YEARS AGO, HAD STRESS TEST NORMAL    Hypertension     Ingrown toenail Childhood    Low back pain     Low testosterone level in male     HAS TESTOSTERONE PELLETS IMPLANTED    Numbness in feet     Obesity     Pain in joint     UNSPECIFIED    Plantar fasciitis 1998    No other occurrence    Shin splints Random over years    During or after exercise    Sinusitis     Sleep apnea with use of continuous positive airway pressure (CPAP)     Sleep apnea, unspecified     SOB (shortness of breath)     Testicular hypofunction     Testosterone deficiency      Current Outpatient Medications   Medication Sig Dispense Refill    cetirizine (zyrTEC) 10 MG tablet Take 1 tablet by mouth Daily. 90 tablet 0    Cholecalciferol (VITAMIN D3) 5000 UNITS capsule capsule Take 2,000 Units by mouth Daily.      fluticasone (FLONASE) 50 MCG/ACT nasal spray Use 2 sprays into the nostril(s) as directed by provider Daily. 16 g 3    lamoTRIgine (LaMICtal) 200 MG tablet Take 1 tablet by mouth Every Morning. 90 tablet 3    multivitamin with minerals tablet tablet Take 1 tablet by mouth Daily.      Probiotic Product (PROBIOTIC PO) Take 1 capsule by mouth Daily. PT TO  HOLD MED PRIOR TO OR      Turmeric 500 MG capsule Take 1 capsule by mouth Daily.      vitamin B-12 (CYANOCOBALAMIN) 1000 MCG tablet Take 5 tablets by mouth Daily.      celecoxib (CeleBREX) 200 MG capsule Take 1 capsule by mouth Daily 90 capsule 0    colestipol (COLESTID) 1 g tablet Take 1 tablet by mouth 2 (Two) Times a Day With Meals. 180 tablet 0    dicyclomine (BENTYL) 10 MG capsule Take 1 capsule by mouth 4 (Four) Times a Day Before Meals & at Bedtime. 120 capsule 0    DULoxetine (Cymbalta) 20 MG capsule Take 1 capsule by mouth Daily for 360 days. Indications: Fibromyalgia Syndrome, Generalized Anxiety Disorder, Major Depressive Disorder 90 capsule 3    fenofibrate 160 MG tablet Take 1 tablet by mouth Daily. 90 tablet 0    gabapentin (NEURONTIN) 100 MG capsule Take 1-2 capsules by mouth At Night As Needed (leg pain). 60 capsule 1    lisinopril (PRINIVIL,ZESTRIL) 20 MG tablet Take 1 tablet by mouth Daily. 90 tablet 0    omeprazole (priLOSEC) 20 MG capsule Take 1 capsule by mouth Daily. 90 capsule 0     No current facility-administered medications for this visit.     Past Surgical History:   Procedure Laterality Date    COLONOSCOPY  2019?    HERNIA REPAIR      JOINT REPLACEMENT  June 2017    Right knee    KNEE SURGERY Right 1985    SD ARTHRP KNE CONDYLE&PLATU MEDIAL&LAT COMPARTMENTS N/A 6/26/2017    Procedure: RIGHT TOTAL KNEE ARTHROPLASTY, LEFT KNEE INJECTED WITH CORTISONE;  Surgeon: Bradford Torres MD;  Location: [facility];  Service: Orthopedics    TONSILLECTOMY  1970    VASECTOMY  1998      There are no preventive care reminders to display for this patient.   Immunization History   Administered Date(s) Administered    COVID-19 (JAGDEEP) 05/14/2021    TD Preservative Free (Tenivac) 03/10/2017         Part of this note may be an electronic transcription/translation of spoken language to printed   text using the Dragon Dictation System.      RAY Morrell

## 2023-08-18 ENCOUNTER — TELEPHONE (OUTPATIENT)
Dept: PSYCHIATRY | Facility: CLINIC | Age: 58
End: 2023-08-18
Payer: COMMERCIAL

## 2023-08-18 DIAGNOSIS — G89.29 OTHER CHRONIC PAIN: ICD-10-CM

## 2023-08-18 DIAGNOSIS — F31.75 BIPOLAR DISORDER, IN PARTIAL REMISSION, MOST RECENT EPISODE DEPRESSED: ICD-10-CM

## 2023-08-18 RX ORDER — GABAPENTIN 100 MG/1
100-200 CAPSULE ORAL NIGHTLY PRN
Qty: 60 CAPSULE | Refills: 1 | Status: SHIPPED | OUTPATIENT
Start: 2023-08-18 | End: 2023-08-28 | Stop reason: SDUPTHER

## 2023-08-18 NOTE — TELEPHONE ENCOUNTER
RECEIVED REFILL REQUEST FROM [facility] FOR PTS CYMBALTA 20 MG CAPSULE.    HOWEVER, THIS MEDICATION WAS SENT TO PHARMACY ON 07/14/23 FOR A 90 DAY SUPPLY WITH 3 REFILLS REMAINING.     DULoxetine (Cymbalta) 20 MG capsule (07/14/2023)     PLEASE REVIEW.

## 2023-08-18 NOTE — TELEPHONE ENCOUNTER
PT CALLED IN AND STATED THAT THEY ARE CHANGING PHARMACY SERVICES.     THEY WILL NOW BE USING [facility].     MEDICATIONS WILL START GOING THRU THEM INSTEAD.     THEIR DIRECT PHARMACY LINE IS.  [phone].

## 2023-08-21 DIAGNOSIS — K22.719 BARRETT'S ESOPHAGUS WITH DYSPLASIA, UNSPECIFIED: ICD-10-CM

## 2023-08-21 DIAGNOSIS — I10 PRIMARY HYPERTENSION: ICD-10-CM

## 2023-08-21 RX ORDER — DULOXETIN HYDROCHLORIDE 20 MG/1
20 CAPSULE, DELAYED RELEASE ORAL DAILY
Qty: 90 CAPSULE | Refills: 3 | Status: SHIPPED | OUTPATIENT
Start: 2023-08-21 | End: 2024-08-15

## 2023-08-21 RX ORDER — LISINOPRIL 20 MG/1
20 TABLET ORAL DAILY
Qty: 90 TABLET | Refills: 0 | Status: SHIPPED | OUTPATIENT
Start: 2023-08-21

## 2023-08-21 RX ORDER — OMEPRAZOLE 20 MG/1
20 CAPSULE, DELAYED RELEASE ORAL DAILY
Qty: 90 CAPSULE | Refills: 0 | Status: SHIPPED | OUTPATIENT
Start: 2023-08-21

## 2023-08-21 NOTE — TELEPHONE ENCOUNTER
Reviewed pharmacy dispense record from [facility] pharmacy which indicated patient was dispensed #30 caps on both 7/17/23 and 8/9/23 with charges of 2.99 each dispense. Will place new order today and send to mail order pharmacy as patient requested.

## 2023-08-22 DIAGNOSIS — E78.00 HIGH CHOLESTEROL: ICD-10-CM

## 2023-08-22 DIAGNOSIS — M17.11 PRIMARY OSTEOARTHRITIS OF RIGHT KNEE: ICD-10-CM

## 2023-08-22 RX ORDER — CELECOXIB 200 MG/1
200 CAPSULE ORAL DAILY
Qty: 90 CAPSULE | Refills: 0 | Status: SHIPPED | OUTPATIENT
Start: 2023-08-22 | End: 2023-11-20

## 2023-08-22 RX ORDER — MONTELUKAST SODIUM 4 MG/1
1 TABLET, CHEWABLE ORAL 2 TIMES DAILY WITH MEALS
Qty: 180 TABLET | Refills: 0 | Status: SHIPPED | OUTPATIENT
Start: 2023-08-22

## 2023-08-22 RX ORDER — FENOFIBRATE 160 MG/1
160 TABLET ORAL DAILY
Qty: 90 TABLET | Refills: 0 | Status: SHIPPED | OUTPATIENT
Start: 2023-08-22

## 2023-08-23 ENCOUNTER — TELEPHONE (OUTPATIENT)
Dept: PSYCHIATRY | Facility: CLINIC | Age: 58
End: 2023-08-23
Payer: COMMERCIAL

## 2023-08-23 DIAGNOSIS — F31.70 BIPOLAR DISORDER, CURRENTLY IN REMISSION: ICD-10-CM

## 2023-08-23 RX ORDER — LAMOTRIGINE 200 MG/1
200 TABLET ORAL EVERY MORNING
Qty: 90 TABLET | Refills: 3 | Status: SHIPPED | OUTPATIENT
Start: 2023-08-23

## 2023-08-23 NOTE — TELEPHONE ENCOUNTER
Verified patient was dispensed 60 tabs from [facility] previously and is allowed next refill on 9/9/23, will proceed with order to mail order pharmacy per patient request.

## 2023-08-23 NOTE — TELEPHONE ENCOUNTER
RECEIVED FAX COMMUNICATION FROM [facility] FOR REFILL ON PTS LAMOTRIGINE.    THIS MEDICATION WAS SENT TO [facility] ON 07/11/23 FOR 90 TABLETS WITH 3 REFILLS.    PLEASE REVIEW AS PT PREVIOUSLY REQUESTED THAT HIS MEDICATION GO THRU [facility] NOW GOING FORWARD. THANK YOU.

## 2023-08-28 DIAGNOSIS — G62.9 NEUROPATHY: ICD-10-CM

## 2023-08-28 RX ORDER — GABAPENTIN 100 MG/1
100 CAPSULE ORAL 3 TIMES DAILY PRN
Qty: 120 CAPSULE | Refills: 0 | Status: SHIPPED | OUTPATIENT
Start: 2023-08-28

## 2023-09-06 DIAGNOSIS — F31.70 BIPOLAR DISORDER, CURRENTLY IN REMISSION: ICD-10-CM

## 2023-09-06 RX ORDER — LAMOTRIGINE 200 MG/1
200 TABLET ORAL EVERY MORNING
Qty: 90 TABLET | Refills: 3 | Status: SHIPPED | OUTPATIENT
Start: 2023-09-06

## 2023-09-11 DIAGNOSIS — J34.89 NASAL OBSTRUCTION: ICD-10-CM

## 2023-09-11 DIAGNOSIS — J30.1 SEASONAL ALLERGIC RHINITIS DUE TO POLLEN: ICD-10-CM

## 2023-09-11 RX ORDER — FLUTICASONE PROPIONATE 50 MCG
2 SPRAY, SUSPENSION (ML) NASAL DAILY
Qty: 16 G | Refills: 3 | Status: SHIPPED | OUTPATIENT
Start: 2023-09-11

## 2023-09-19 ENCOUNTER — HOSPITAL ENCOUNTER (OUTPATIENT)
Dept: CT IMAGING | Facility: HOSPITAL | Age: 58
Discharge: HOME | End: 2023-09-19
Admitting: NURSE PRACTITIONER
Payer: COMMERCIAL

## 2023-09-19 DIAGNOSIS — R07.89 LEFT-SIDED CHEST WALL PAIN: ICD-10-CM

## 2023-09-19 PROCEDURE — 71250 CT THORAX DX C-: CPT

## 2023-09-25 ENCOUNTER — OFFICE VISIT (OUTPATIENT)
Dept: FAMILY MEDICINE CLINIC | Age: 58
End: 2023-09-25

## 2023-09-25 VITALS
BODY MASS INDEX: 43.75 KG/M2 | HEIGHT: 69 IN | DIASTOLIC BLOOD PRESSURE: 90 MMHG | WEIGHT: 295.4 LBS | OXYGEN SATURATION: 94 % | TEMPERATURE: 98.2 F | SYSTOLIC BLOOD PRESSURE: 137 MMHG | HEART RATE: 77 BPM

## 2023-09-25 DIAGNOSIS — R19.7 DIARRHEA, UNSPECIFIED TYPE: ICD-10-CM

## 2023-09-25 DIAGNOSIS — G62.9 NEUROPATHY: Primary | ICD-10-CM

## 2023-09-25 PROCEDURE — 99213 OFFICE O/P EST LOW 20 MIN: CPT | Performed by: NURSE PRACTITIONER

## 2023-09-25 RX ORDER — GABAPENTIN 100 MG/1
CAPSULE ORAL
Qty: 120 CAPSULE | Refills: 1 | Status: SHIPPED | OUTPATIENT
Start: 2023-09-25

## 2023-09-25 NOTE — PROGRESS NOTES
"Chief Complaint  Anthony Brown presents to [facility] for Peripheral Neuropathy (Neuropathy follow up )      Subjective     History of Present Illness    Regarding diarrhea- he reports that there has been some improvement with the addition of bentyl and continuance of colestipol .  He states that he is better but still present- where he was having accidents 7-10 times per week, he is down now to 2-3 times per week.  Still with urgency , but feels like he has some time.      He states that the neuropathy is still bothersome.  He is taking 100mg twice daily but may double up at night to 200mg nightly.  It does make him drowsy during the day, so he will be unable to take is during the day at a higher dose but much of his symptoms are at night.    Assessment and Plan       Diagnoses and all orders for this visit:    1. Neuropathy (Primary)  Comments:  100mg during the day and 300mg at night to see if may he may benefit  Orders:  -     gabapentin (NEURONTIN) 100 MG capsule; Take 1 capsule by mouth Every Morning AND 3 capsules Every Night.  Dispense: 120 capsule; Refill: 1    2. Diarrhea, unspecified type  Comments:  discussed possible increase in bentyl- but he prefers to stay on current tratment and follow up next appt            Follow Up   Return for Next scheduled follow up.      New Medications Ordered This Visit   Medications    gabapentin (NEURONTIN) 100 MG capsule     Sig: Take 1 capsule by mouth Every Morning AND 3 capsules Every Night.     Dispense:  120 capsule     Refill:  1       Medications Discontinued During This Encounter   Medication Reason    gabapentin (NEURONTIN) 100 MG capsule Reorder          Review of Systems    Objective     Vitals:    09/25/23 1319   BP: 137/90   BP Location: Left arm   Patient Position: Sitting   Cuff Size: Adult   Pulse: 77   Temp: 98.2 °F (36.8 °C)   TempSrc: Oral   SpO2: 94%   Weight: 134 kg (295 lb 6.4 oz)   Height: 175.3 cm (69.02\") "     Body mass index is 43.6 kg/m².          Physical Exam  Vitals reviewed.   Constitutional:       Appearance: Normal appearance. He is obese.   HENT:      Head: Normocephalic.   Eyes:      Pupils: Pupils are equal, round, and reactive to light.   Cardiovascular:      Rate and Rhythm: Normal rate and regular rhythm.      Heart sounds: No murmur heard.  Pulmonary:      Effort: Pulmonary effort is normal.      Breath sounds: Normal breath sounds.   Musculoskeletal:         General: Normal range of motion.   Neurological:      Mental Status: He is alert.   Psychiatric:         Mood and Affect: Mood normal.         Behavior: Behavior normal.          Result Review               No Known Allergies   Past Medical History:   Diagnosis Date    Acute URI     Arthritis     bialteral knees    Abreu's esophagus with dysplasia, unspecified     Bipolar disorder     Callus Always    Chronic pain disorder     Colon polyp 2019?    Contact with and (suspected) exposure to other viral communicable diseases     Depression     Deviated nasal septum     Difficulty walking Feb 2022    Random pain    GERD (gastroesophageal reflux disease) 2008    High cholesterol     History of chest pain     6 YEARS AGO, HAD STRESS TEST NORMAL    Hypertension     Ingrown toenail Childhood    Low back pain     Low testosterone level in male     HAS TESTOSTERONE PELLETS IMPLANTED    Numbness in feet     Obesity     Pain in joint     UNSPECIFIED    Plantar fasciitis 1998    No other occurrence    Shin splints Random over years    During or after exercise    Sinusitis     Sleep apnea with use of continuous positive airway pressure (CPAP)     Sleep apnea, unspecified     SOB (shortness of breath)     Testicular hypofunction     Testosterone deficiency      Current Outpatient Medications   Medication Sig Dispense Refill    celecoxib (CeleBREX) 200 MG capsule Take 1 capsule by mouth Daily 90 capsule 0    cetirizine (zyrTEC) 10 MG tablet Take 1 tablet by mouth  Daily. 90 tablet 0    Cholecalciferol (VITAMIN D3) 5000 UNITS capsule capsule Take 2,000 Units by mouth Daily.      colestipol (COLESTID) 1 g tablet Take 1 tablet by mouth 2 (Two) Times a Day With Meals. 180 tablet 0    dicyclomine (BENTYL) 10 MG capsule Take 1 capsule by mouth 4 (Four) Times a Day Before Meals & at Bedtime. 120 capsule 0    DULoxetine (Cymbalta) 20 MG capsule Take 1 capsule by mouth Daily for 360 days. Indications: Fibromyalgia Syndrome, Generalized Anxiety Disorder, Major Depressive Disorder 90 capsule 3    fenofibrate 160 MG tablet Take 1 tablet by mouth Daily. 90 tablet 0    fluticasone (FLONASE) 50 MCG/ACT nasal spray Use 2 sprays into the nostril(s) as directed by provider Daily. 16 g 3    gabapentin (NEURONTIN) 100 MG capsule Take 1 capsule by mouth Every Morning AND 3 capsules Every Night. 120 capsule 1    lamoTRIgine (LaMICtal) 200 MG tablet Take 1 tablet by mouth Every Morning. 90 tablet 3    lisinopril (PRINIVIL,ZESTRIL) 20 MG tablet Take 1 tablet by mouth Daily. 90 tablet 0    multivitamin with minerals tablet tablet Take 1 tablet by mouth Daily.      omeprazole (priLOSEC) 20 MG capsule Take 1 capsule by mouth Daily. 90 capsule 0    Probiotic Product (PROBIOTIC PO) Take 1 capsule by mouth Daily. PT TO HOLD MED PRIOR TO OR      Turmeric 500 MG capsule Take 1 capsule by mouth Daily.      vitamin B-12 (CYANOCOBALAMIN) 1000 MCG tablet Take 5 tablets by mouth Daily.       No current facility-administered medications for this visit.     Past Surgical History:   Procedure Laterality Date    COLONOSCOPY  2019?    HERNIA REPAIR      JOINT REPLACEMENT  June 2017    Right knee    KNEE SURGERY Right 1985    MD ARTHRP KNE CONDYLE&PLATU MEDIAL&LAT COMPARTMENTS N/A 6/26/2017    Procedure: RIGHT TOTAL KNEE ARTHROPLASTY, LEFT KNEE INJECTED WITH CORTISONE;  Surgeon: Bradford Torres MD;  Location: [facility];  Service: Orthopedics    TONSILLECTOMY  1970    VASECTOMY  1998      There are no preventive  care reminders to display for this patient.   Immunization History   Administered Date(s) Administered    COVID-19 (JAGDEEP) 05/14/2021    TD Preservative Free (Tenivac) 03/10/2017         Part of this note may be an electronic transcription/translation of spoken language to printed   text using the Dragon Dictation System.      Mercedes Armstrong, APRN

## 2023-10-05 ENCOUNTER — PATIENT MESSAGE (OUTPATIENT)
Dept: FAMILY MEDICINE CLINIC | Age: 58
End: 2023-10-05
Payer: COMMERCIAL

## 2023-10-05 DIAGNOSIS — R53.83 OTHER FATIGUE: ICD-10-CM

## 2023-10-05 DIAGNOSIS — R73.03 PREDIABETES: Primary | ICD-10-CM

## 2023-10-05 DIAGNOSIS — Z13.6 SCREENING FOR CARDIOVASCULAR CONDITION: ICD-10-CM

## 2023-10-09 DIAGNOSIS — G62.9 NEUROPATHY: ICD-10-CM

## 2023-10-09 RX ORDER — GABAPENTIN 100 MG/1
CAPSULE ORAL
Qty: 120 CAPSULE | Refills: 0 | Status: SHIPPED | OUTPATIENT
Start: 2023-10-09

## 2023-10-10 RX ORDER — MONTELUKAST SODIUM 4 MG/1
1 TABLET, CHEWABLE ORAL 2 TIMES DAILY WITH MEALS
Qty: 180 TABLET | Refills: 3 | Status: SHIPPED | OUTPATIENT
Start: 2023-10-10

## 2023-10-13 ENCOUNTER — OFFICE VISIT (OUTPATIENT)
Dept: FAMILY MEDICINE CLINIC | Age: 58
End: 2023-10-13
Payer: COMMERCIAL

## 2023-10-13 ENCOUNTER — HOSPITAL ENCOUNTER (OUTPATIENT)
Dept: GENERAL RADIOLOGY | Facility: HOSPITAL | Age: 58
Discharge: HOME | End: 2023-10-13
Payer: COMMERCIAL

## 2023-10-13 ENCOUNTER — LAB (OUTPATIENT)
Dept: LAB | Facility: HOSPITAL | Age: 58
End: 2023-10-13
Payer: COMMERCIAL

## 2023-10-13 VITALS
WEIGHT: 301 LBS | SYSTOLIC BLOOD PRESSURE: 137 MMHG | HEIGHT: 69 IN | HEART RATE: 67 BPM | DIASTOLIC BLOOD PRESSURE: 83 MMHG | TEMPERATURE: 98.2 F | OXYGEN SATURATION: 96 % | BODY MASS INDEX: 44.58 KG/M2

## 2023-10-13 DIAGNOSIS — R73.03 PREDIABETES: ICD-10-CM

## 2023-10-13 DIAGNOSIS — S62.616A CLOSED DISPLACED FRACTURE OF PROXIMAL PHALANX OF RIGHT LITTLE FINGER, INITIAL ENCOUNTER: Primary | ICD-10-CM

## 2023-10-13 DIAGNOSIS — M79.644 PAIN OF FINGER OF RIGHT HAND: ICD-10-CM

## 2023-10-13 DIAGNOSIS — R53.83 OTHER FATIGUE: ICD-10-CM

## 2023-10-13 DIAGNOSIS — Z13.6 SCREENING FOR CARDIOVASCULAR CONDITION: ICD-10-CM

## 2023-10-13 LAB
ALBUMIN SERPL-MCNC: 4.1 G/DL (ref 3.5–5.2)
ALBUMIN/GLOB SERPL: 1.4 G/DL
ALP SERPL-CCNC: 96 U/L (ref 39–117)
ALT SERPL W P-5'-P-CCNC: 42 U/L (ref 1–41)
ANION GAP SERPL CALCULATED.3IONS-SCNC: 14.5 MMOL/L (ref 5–15)
ARTICHOKE IGE QN: 55 MG/DL (ref 0–100)
AST SERPL-CCNC: 31 U/L (ref 1–40)
BILIRUB SERPL-MCNC: <0.2 MG/DL (ref 0–1.2)
BUN SERPL-MCNC: 10 MG/DL (ref 6–20)
BUN/CREAT SERPL: 10.3 (ref 7–25)
CALCIUM SPEC-SCNC: 9.9 MG/DL (ref 8.6–10.5)
CHLORIDE SERPL-SCNC: 100 MMOL/L (ref 98–107)
CHOLEST SERPL-MCNC: 246 MG/DL (ref 0–200)
CO2 SERPL-SCNC: 21.5 MMOL/L (ref 22–29)
CREAT SERPL-MCNC: 0.97 MG/DL (ref 0.76–1.27)
EGFRCR SERPLBLD CKD-EPI 2021: 91.1 ML/MIN/1.73
GLOBULIN UR ELPH-MCNC: 2.9 GM/DL
GLUCOSE SERPL-MCNC: 200 MG/DL (ref 65–99)
HBA1C MFR BLD: 7.5 % (ref 4.8–5.6)
HDLC SERPL-MCNC: 16 MG/DL (ref 40–60)
LDLC SERPL CALC-MCNC: ABNORMAL MG/DL
LDLC/HDLC SERPL: ABNORMAL {RATIO}
POTASSIUM SERPL-SCNC: 4.2 MMOL/L (ref 3.5–5.2)
PROT SERPL-MCNC: 7 G/DL (ref 6–8.5)
SODIUM SERPL-SCNC: 136 MMOL/L (ref 136–145)
TRIGL SERPL-MCNC: 1574 MG/DL (ref 0–150)
VLDLC SERPL-MCNC: ABNORMAL MG/DL

## 2023-10-13 PROCEDURE — 80053 COMPREHEN METABOLIC PANEL: CPT

## 2023-10-13 PROCEDURE — 83036 HEMOGLOBIN GLYCOSYLATED A1C: CPT

## 2023-10-13 PROCEDURE — 83721 ASSAY OF BLOOD LIPOPROTEIN: CPT

## 2023-10-13 PROCEDURE — 36415 COLL VENOUS BLD VENIPUNCTURE: CPT

## 2023-10-13 PROCEDURE — 84403 ASSAY OF TOTAL TESTOSTERONE: CPT

## 2023-10-13 PROCEDURE — 73130 X-RAY EXAM OF HAND: CPT

## 2023-10-13 PROCEDURE — 80061 LIPID PANEL: CPT

## 2023-10-13 PROCEDURE — 84402 ASSAY OF FREE TESTOSTERONE: CPT

## 2023-10-13 NOTE — PROGRESS NOTES
Subjective     CHIEF COMPLAINT    Chief Complaint   Patient presents with    Hand Pain     Had a fall and thinks he broke his pinky on right hand     History of Present Illness  Patient is a 58-year-old male who presents to the clinic today with concerns for a broken pinky finger.  He states that on Wednesday he had a fall.  He said he leaned over to pick something up and fell over.  He is unsure exactly how he injured his hand but his right pinky finger is very painful and bruised.  Pain is located at the base of the finger.  Denies any open wounds.  He has been taking pain medication at home for his symptoms.  His wife encouraged him to come to the clinic today to be evaluated.  No numbness or tingling.    Review of Systems   Constitutional:  Negative for chills and fever.   Musculoskeletal:  Positive for arthralgias and joint swelling.   Skin:  Positive for color change (Ecchymosis right pinky finger). Negative for wound.   Neurological:  Negative for numbness.     No Known Allergies    Current Outpatient Medications on File Prior to Visit   Medication Sig Dispense Refill    celecoxib (CeleBREX) 200 MG capsule Take 1 capsule by mouth Daily 90 capsule 0    cetirizine (zyrTEC) 10 MG tablet Take 1 tablet by mouth Daily. 90 tablet 0    Cholecalciferol (VITAMIN D3) 5000 UNITS capsule capsule Take 2,000 Units by mouth Daily.      colestipol (COLESTID) 1 g tablet TAKE 1 TABLET BY MOUTH TWICE  DAILY WITH MEALS 180 tablet 3    dicyclomine (BENTYL) 10 MG capsule Take 1 capsule by mouth 4 (Four) Times a Day Before Meals & at Bedtime. 120 capsule 0    DULoxetine (Cymbalta) 20 MG capsule Take 1 capsule by mouth Daily for 360 days. Indications: Fibromyalgia Syndrome, Generalized Anxiety Disorder, Major Depressive Disorder 90 capsule 3    fenofibrate 160 MG tablet Take 1 tablet by mouth Daily. 90 tablet 0    fluticasone (FLONASE) 50 MCG/ACT nasal spray Use 2 sprays into the nostril(s) as directed by provider Daily. 16 g 3     "gabapentin (NEURONTIN) 100 MG capsule Take 1 capsule by mouth Every Morning AND 3 capsules Every Night. 120 capsule 0    lamoTRIgine (LaMICtal) 200 MG tablet Take 1 tablet by mouth Every Morning. 90 tablet 3    lisinopril (PRINIVIL,ZESTRIL) 20 MG tablet Take 1 tablet by mouth Daily. 90 tablet 0    multivitamin with minerals tablet tablet Take 1 tablet by mouth Daily.      omeprazole (priLOSEC) 20 MG capsule Take 1 capsule by mouth Daily. 90 capsule 0    Probiotic Product (PROBIOTIC PO) Take 1 capsule by mouth Daily. PT TO HOLD MED PRIOR TO OR      Turmeric 500 MG capsule Take 1 capsule by mouth Daily.      vitamin B-12 (CYANOCOBALAMIN) 1000 MCG tablet Take 5 tablets by mouth Daily.       No current facility-administered medications on file prior to visit.     /83 (BP Location: Left arm, Patient Position: Sitting, Cuff Size: Large Adult)   Pulse 67   Temp 98.2 øF (36.8 øC) (Oral)   Ht 175.3 cm (69.02\")   Wt (!) 137 kg (301 lb)   SpO2 96%   BMI 44.43 kg/mý     Objective     Physical Exam  Vitals and nursing note reviewed.   Constitutional:       General: He is not in acute distress.     Appearance: Normal appearance. He is not ill-appearing.   HENT:      Head: Normocephalic.   Pulmonary:      Effort: Pulmonary effort is normal. No accessory muscle usage or respiratory distress.   Musculoskeletal:      Comments: Ecchymosis, tenderness and swelling right 5th finger at proximal phalanx. Decreased ROM of 5th finger due to pain and swelling. No open wounds noted.    Skin:     General: Skin is warm and dry.      Capillary Refill: Capillary refill takes less than 2 seconds.   Neurological:      General: No focal deficit present.      Mental Status: He is alert and oriented to person, place, and time.          XR Hand 3+ View Right    Result Date: 10/13/2023  PROCEDURE: XR HAND 3+ VW RIGHT  COMPARISON: None  INDICATIONS: RIGHT 5TH FINGER / 5TH METACARPAL PAIN AFTER FALL X 2 DAYS  FINDINGS:  There is a fracture " through the base of the proximal phalanx of the 5th digit.  The fracture extends intra-articularly and lies along the lateral aspect of the articular surface.  It is displaced by approximately 1 mm.  The remaining bony elements appear intact and in normal alignment.        1. Intra-articular fracture through the base of the proximal phalanx of the 5th digit      Miguel Angel Vasquez MD       Electronically Signed and Approved By: Miguel Angel Vasquez MD on 10/13/2023 at 9:48                  Diagnoses and all orders for this visit:    1. Closed displaced fracture of proximal phalanx of right little finger, initial encounter (Primary)  -     Ambulatory Referral to Hand Surgery    2. Pain of finger of right hand  -     XR Hand 3+ View Right; Future      We will check an x-ray of the right hand today for further evaluation.  Plan to splint patient and then notify him of x-ray results.  Can use over-the-counter treatment for pain.    Addendum: X-ray shows intra-articular fracture through the base of the proximal phalanx that is approximately 1 mm displaced.  Discussed this result with on-call provider, Dr. Suarez.  Would recommend same-day visit with hand surgeon for further evaluation.  Spoke with referrals department who stated that they were unable to get patient an appointment today with any hand surgeon and local hand surgeons recommended [facility] for further evaluation.  Patient notified in office of these x-ray results as well as recommendations to proceed to the ER for further work-up and evaluation.  He was educated to proceed to [facility] as they have a hand ER.  He voiced understanding and stated he would proceed to the ER for further work-up and evaluation.         Follow up:  Return if symptoms worsen or fail to improve.  Patient was given instructions and counseling regarding his condition or for health maintenance advice. Please see specific information pulled into the AVS if appropriate.

## 2023-10-15 DIAGNOSIS — E78.00 HIGH CHOLESTEROL: ICD-10-CM

## 2023-10-15 DIAGNOSIS — K22.719 BARRETT'S ESOPHAGUS WITH DYSPLASIA, UNSPECIFIED: ICD-10-CM

## 2023-10-16 DIAGNOSIS — R19.7 DIARRHEA, UNSPECIFIED TYPE: ICD-10-CM

## 2023-10-16 RX ORDER — FENOFIBRATE 160 MG/1
160 TABLET ORAL DAILY
Qty: 90 TABLET | Refills: 3 | Status: SHIPPED | OUTPATIENT
Start: 2023-10-16

## 2023-10-16 RX ORDER — OMEPRAZOLE 20 MG/1
20 CAPSULE, DELAYED RELEASE ORAL DAILY
Qty: 90 CAPSULE | Refills: 3 | Status: SHIPPED | OUTPATIENT
Start: 2023-10-16

## 2023-10-16 RX ORDER — DICYCLOMINE HYDROCHLORIDE 10 MG/1
10 CAPSULE ORAL
Qty: 120 CAPSULE | Refills: 0 | Status: SHIPPED | OUTPATIENT
Start: 2023-10-16

## 2023-10-18 ENCOUNTER — TELEPHONE (OUTPATIENT)
Dept: FAMILY MEDICINE CLINIC | Age: 58
End: 2023-10-18
Payer: COMMERCIAL

## 2023-10-18 LAB
TESTOST FREE SERPL-MCNC: ABNORMAL PG/ML
TESTOST SERPL-MCNC: 205 NG/DL (ref 264–916)

## 2023-10-18 NOTE — TELEPHONE ENCOUNTER
Lab called pt testosterone was sent out to [facility] and they were only able to do his total test not free his blood was to lipemic, if this needs to be done pt will need to drink plenty of water

## 2023-10-20 ENCOUNTER — OFFICE VISIT (OUTPATIENT)
Dept: FAMILY MEDICINE CLINIC | Age: 58
End: 2023-10-20
Payer: COMMERCIAL

## 2023-10-20 ENCOUNTER — PRIOR AUTHORIZATION (OUTPATIENT)
Dept: FAMILY MEDICINE CLINIC | Age: 58
End: 2023-10-20

## 2023-10-20 VITALS
OXYGEN SATURATION: 97 % | WEIGHT: 301.8 LBS | HEIGHT: 69 IN | SYSTOLIC BLOOD PRESSURE: 136 MMHG | BODY MASS INDEX: 44.7 KG/M2 | HEART RATE: 76 BPM | TEMPERATURE: 98.6 F | DIASTOLIC BLOOD PRESSURE: 78 MMHG

## 2023-10-20 DIAGNOSIS — S62.616A CLOSED DISPLACED FRACTURE OF PROXIMAL PHALANX OF RIGHT LITTLE FINGER, INITIAL ENCOUNTER: ICD-10-CM

## 2023-10-20 DIAGNOSIS — E66.01 CLASS 3 SEVERE OBESITY DUE TO EXCESS CALORIES WITHOUT SERIOUS COMORBIDITY WITH BODY MASS INDEX (BMI) OF 40.0 TO 44.9 IN ADULT: ICD-10-CM

## 2023-10-20 DIAGNOSIS — Z00.00 ROUTINE GENERAL MEDICAL EXAMINATION AT A HEALTH CARE FACILITY: Primary | ICD-10-CM

## 2023-10-20 DIAGNOSIS — E11.40 TYPE 2 DIABETES MELLITUS WITH DIABETIC NEUROPATHY, WITHOUT LONG-TERM CURRENT USE OF INSULIN: ICD-10-CM

## 2023-10-20 DIAGNOSIS — R55 SYNCOPE AND COLLAPSE: ICD-10-CM

## 2023-10-20 DIAGNOSIS — G62.9 NEUROPATHY: ICD-10-CM

## 2023-10-20 DIAGNOSIS — K76.0 FATTY LIVER: ICD-10-CM

## 2023-10-20 DIAGNOSIS — R19.7 DIARRHEA, UNSPECIFIED TYPE: ICD-10-CM

## 2023-10-20 RX ORDER — DULAGLUTIDE 0.75 MG/.5ML
0.75 INJECTION, SOLUTION SUBCUTANEOUS WEEKLY
Qty: 6 ML | Refills: 0 | Status: SHIPPED | OUTPATIENT
Start: 2023-10-20 | End: 2023-10-20 | Stop reason: ALTCHOICE

## 2023-10-20 RX ORDER — SEMAGLUTIDE 0.68 MG/ML
0.25 INJECTION, SOLUTION SUBCUTANEOUS WEEKLY
Qty: 9 ML | Refills: 0 | Status: SHIPPED | OUTPATIENT
Start: 2023-10-20

## 2023-10-20 RX ORDER — ATORVASTATIN CALCIUM 10 MG/1
10 TABLET, FILM COATED ORAL DAILY
Qty: 30 TABLET | Refills: 2 | Status: SHIPPED | OUTPATIENT
Start: 2023-10-20

## 2023-10-20 NOTE — PROGRESS NOTES
Chief Complaint  Antohny Brown presents to [facility] for Annual Exam (UDS completed 8/23 for gabapentin ), Osteoarthritis (6 MO. F/U ), Hyperlipidemia, Peripheral Neuropathy, and Hypertension      Subjective     History of Present Illness  Jeremy is here today for annual exam.   Last annual exam was  1 year(s)  Last eye exam: 1 year  PROVIDER:  [facility]   Last dental exam:   9 months    PROVIDER:  [facility]    Diet / exercise:   No special diet or specific exercise program  Patient's Body mass index is 44.54 kg/m². indicating that he is obese (BMI >30)  Satisfied with weight?  no    Patient Care Team:  Mercedes Armstrong APRN as PCP - General (Nurse Practitioner)  Milo Deluca MD as Consulting Physician (Pulmonary Disease)  Hanna Bethea APRN as Nurse Practitioner (Behavioral Health)  Shawna Aldrich MA as Medical Assistant     Diabetes type:  Type 2 - recent labs indicate new diagnosis of DM    A1C Last 3 Results          4/18/2023    08:44 10/13/2023    07:10   HGBA1C Last 3 Results   Hemoglobin A1C 5.90  7.50          Current diabetic medications include  none currently but ok with starting- unable to start Metformin due to his ongoing GI issues/ diarrhea    Diabetic Review of Systems:  Diabetic diet compliance: noncompliant much of the time  Blood sugar log: not checked  Any hypoglycemic episodes? - No        Your patient is overdue for a Diabetic Eye Exam           Is He on ACE inhibitor or angiotensin II receptor blocker and a statin? Lisinopril    fenofibrate      His testosterone was low but at this time, he is not interested in pursuing treatment.  He was previously taking treatment- testosterone pellets and this was years ago.  Feels that he wants to get some of the other stuff under control first    Continues to follow up with [facility] and feels that he is a little off due to his wife recent knee surgery since he is out of his routine, but  overall- doing good.     Regarding diarrhea- taking bentyl and colestipol and this has helped since starting , but still having occasional flare ups.       His recent labs showed elevated triglycerides- thinks due to a heavy sugar burden prior to his labs  but family history sig for elevation He is taking his fenofibrate for this as prescribed.       Jeremy presents today for follow up on Hypertension.    Current medication / treatment includes lisinopril (generic).    Cardiac symptoms dyspnea, syncope, and weight gain.  The ASCVD Risk score (Karen DK, et al., 2019) failed to calculate for the following reasons:    The valid HDL cholesterol range is 20 to 100 mg/dL     He did recently have an episode of him passing out.  He reports that he was bending over and 'just went down' he woke up on the floor and did fracture his right 5th finger in the process.  He will follow up with [facility] next week.  This has not happened again since that episode.      Regarding his neuropathy, feels that the dose he is on is ok - still present but is able to sleep now without as much disruption.  Currently taking gabapentin 100mg daily and 300mg at night              Assessment and Plan     -advised of a well balanced diet and exercise as tolerated  -advised of annual wellness exams are recommended  -discussed health care maintenance and gaps in care and orders have been placed as necessary and as willing by the patient.     Diagnoses and all orders for this visit:    1. Routine general medical examination at a health care facility (Primary)    2. Type 2 diabetes mellitus with diabetic neuropathy, without long-term current use of insulin  Comments:  will start injectable to see if will help assist in weight loss as well as his fatty liver disease, improve his A1C as well.  Avoid metformin due  diarrhea hx  Orders:  -     Discontinue: Dulaglutide (Trulicity) 0.75 MG/0.5ML solution pen-injector; Inject 0.75 mg under the  skin into the appropriate area as directed 1 (One) Time Per Week.  Dispense: 6 mL; Refill: 0  -     atorvastatin (LIPITOR) 10 MG tablet; Take 1 tablet by mouth Daily.  Dispense: 30 tablet; Refill: 2  -     Semaglutide,0.25 or 0.5MG/DOS, (Ozempic, 0.25 or 0.5 MG/DOSE,) 2 MG/3ML solution pen-injector; Inject 0.25 mg under the skin into the appropriate area as directed 1 (One) Time Per Week for 4 weeks then increase dose to 0.5mg weekly.  Dispense: 9 mL; Refill: 0    3. Fatty liver  Comments:  weight loss highly advised to avoid advancing disease.    4. Neuropathy  Comments:  he feels that his condition is stable for now  will discuss option for dose change at future appt    5. Syncope and collapse  Comments:  follow up if happens again, may have been vasovagal but will work up if happens again    6. Closed displaced fracture of proximal phalanx of right little finger, initial encounter  Comments:  follow up with hand specialist as recommended    7. Diarrhea, unspecified type  Comments:  continue current treatment and follow up PRN   avoid use of metformin due to risk of worsening symptoms    8. Class 3 severe obesity due to excess calories without serious comorbidity with body mass index (BMI) of 40.0 to 44.9 in adult  Comments:  weight loss may improve chronic conditions including fatty liver, htn, hld, DM, neuropathy           Follow Up   Return in about 3 months (around 1/20/2024) for Recheck.    Future Appointments   Date Time Provider Department Center   1/22/2024  8:00 AM [facility] Armstrong APRN [facility]   4/19/2024  9:30 AM Mercedes Armstrong APRN [facility]   7/11/2024  8:00 AM Hanna Bethea APRN [facility]         New Medications Ordered This Visit   Medications    atorvastatin (LIPITOR) 10 MG tablet     Sig: Take 1 tablet by mouth Daily.     Dispense:  30 tablet     Refill:  2    Semaglutide,0.25 or 0.5MG/DOS, (Ozempic, 0.25 or 0.5 MG/DOSE,) 2 MG/3ML solution pen-injector     Sig: Inject 0.25  "mg under the skin into the appropriate area as directed 1 (One) Time Per Week for 4 weeks then increase dose to 0.5mg weekly.     Dispense:  9 mL     Refill:  0       Medications Discontinued During This Encounter   Medication Reason    Dulaglutide (Trulicity) 0.75 MG/0.5ML solution pen-injector Alternate therapy         Review of Systems   Constitutional:  Positive for fatigue (waxes and wanes). Negative for unexpected weight gain.   HENT:  Negative for congestion and sinus pressure.    Eyes:  Positive for visual disturbance (glasses). Negative for blurred vision.   Respiratory:  Negative for cough and shortness of breath.    Gastrointestinal:  Positive for diarrhea.   Allergic/Immunologic: Positive for environmental allergies (controlled on medicaiton).   Neurological:  Positive for syncope (x 1 episode).   Psychiatric/Behavioral:  Negative for sleep disturbance, depressed mood and stress. The patient is not nervous/anxious.        Objective     Vitals:    10/20/23 0821 10/20/23 0915   BP: 143/74 136/78   BP Location: Right arm Left arm   Patient Position: Sitting    Cuff Size: Adult    Pulse: 76    Temp: 98.6 °F (37 °C)    TempSrc: Oral    SpO2: 97%    Weight: (!) 137 kg (301 lb 12.8 oz)    Height: 175.3 cm (69.02\")      Body mass index is 44.54 kg/m².            Physical Exam  Vitals reviewed.   Constitutional:       Appearance: Normal appearance. He is obese.   HENT:      Head: Normocephalic.      Mouth/Throat:      Mouth: Mucous membranes are moist.      Pharynx: Oropharynx is clear.   Eyes:      Conjunctiva/sclera: Conjunctivae normal.   Cardiovascular:      Rate and Rhythm: Normal rate and regular rhythm.      Heart sounds: No murmur heard.  Pulmonary:      Effort: Pulmonary effort is normal.      Breath sounds: Normal breath sounds.   Abdominal:      General: Abdomen is protuberant. Bowel sounds are increased.      Tenderness: There is no abdominal tenderness.   Musculoskeletal:         General: Normal " range of motion.      Right hand: Decreased range of motion (right 5th finger secondary to splinting in place).      Cervical back: Normal range of motion.   Skin:     General: Skin is warm and dry.   Neurological:      General: No focal deficit present.      Mental Status: He is alert. Mental status is at baseline.   Psychiatric:         Mood and Affect: Mood normal.         Behavior: Behavior normal.         Thought Content: Thought content normal.            Result Review                   No Known Allergies   Past Medical History:   Diagnosis Date    Acute URI     Arthritis     bialteral knees    Abreu's esophagus with dysplasia, unspecified     Bipolar disorder     Callus Always    Chronic pain disorder     Colon polyp 2019?    Contact with and (suspected) exposure to other viral communicable diseases     Depression     Deviated nasal septum     Difficulty walking Feb 2022    Random pain    GERD (gastroesophageal reflux disease) 2008    High cholesterol     History of chest pain     6 YEARS AGO, HAD STRESS TEST NORMAL    Hypertension     Ingrown toenail Childhood    Low back pain     Low testosterone level in male     HAS TESTOSTERONE PELLETS IMPLANTED    Numbness in feet     Obesity     Pain in joint     UNSPECIFIED    Plantar fasciitis 1998    No other occurrence    Shin splints Random over years    During or after exercise    Sinusitis     Sleep apnea with use of continuous positive airway pressure (CPAP)     Sleep apnea, unspecified     SOB (shortness of breath)     Testicular hypofunction     Testosterone deficiency      Current Outpatient Medications   Medication Sig Dispense Refill    celecoxib (CeleBREX) 200 MG capsule Take 1 capsule by mouth Daily 90 capsule 0    cetirizine (zyrTEC) 10 MG tablet Take 1 tablet by mouth Daily. 90 tablet 0    Cholecalciferol (VITAMIN D3) 5000 UNITS capsule capsule Take 2,000 Units by mouth Daily.      colestipol (COLESTID) 1 g tablet TAKE 1 TABLET BY MOUTH TWICE  DAILY  WITH MEALS 180 tablet 3    dicyclomine (BENTYL) 10 MG capsule Take 1 capsule by mouth 4 (Four) Times a Day Before Meals & at Bedtime. 120 capsule 0    DULoxetine (Cymbalta) 20 MG capsule Take 1 capsule by mouth Daily for 360 days. Indications: Fibromyalgia Syndrome, Generalized Anxiety Disorder, Major Depressive Disorder 90 capsule 3    fenofibrate 160 MG tablet TAKE 1 TABLET BY MOUTH DAILY 90 tablet 3    fluticasone (FLONASE) 50 MCG/ACT nasal spray Use 2 sprays into the nostril(s) as directed by provider Daily. 16 g 3    gabapentin (NEURONTIN) 100 MG capsule Take 1 capsule by mouth Every Morning AND 3 capsules Every Night. 120 capsule 0    lamoTRIgine (LaMICtal) 200 MG tablet Take 1 tablet by mouth Every Morning. 90 tablet 3    lisinopril (PRINIVIL,ZESTRIL) 20 MG tablet Take 1 tablet by mouth Daily. 90 tablet 0    multivitamin with minerals tablet tablet Take 1 tablet by mouth Daily.      omeprazole (priLOSEC) 20 MG capsule TAKE 1 CAPSULE BY MOUTH DAILY 90 capsule 3    Probiotic Product (PROBIOTIC PO) Take 1 capsule by mouth Daily. PT TO HOLD MED PRIOR TO OR      Turmeric 500 MG capsule Take 1 capsule by mouth Daily.      vitamin B-12 (CYANOCOBALAMIN) 1000 MCG tablet Take 5 tablets by mouth Daily.      atorvastatin (LIPITOR) 10 MG tablet Take 1 tablet by mouth Daily. 30 tablet 2    Semaglutide,0.25 or 0.5MG/DOS, (Ozempic, 0.25 or 0.5 MG/DOSE,) 2 MG/3ML solution pen-injector Inject 0.25 mg under the skin into the appropriate area as directed 1 (One) Time Per Week for 4 weeks then increase dose to 0.5mg weekly. 9 mL 0     No current facility-administered medications for this visit.     Past Surgical History:   Procedure Laterality Date    COLONOSCOPY  2019?    HERNIA REPAIR      JOINT REPLACEMENT  June 2017    Right knee    KNEE SURGERY Right 1985    [place] ARTHRP KNE CONDYLE&PLATU MEDIAL&LAT COMPARTMENTS N/A 6/26/2017    Procedure: RIGHT TOTAL KNEE ARTHROPLASTY, LEFT KNEE INJECTED WITH CORTISONE;  Surgeon: Bradford Torres,  MD;  Location: [facility];  Service: Orthopedics    TONSILLECTOMY  1970    VASECTOMY  1998      Health Maintenance Due   Topic Date Due    Hepatitis B (1 of 3 - 3-dose series) Never done    URINE MICROALBUMIN  Never done    Pneumococcal Vaccine 0-64 (1 - PCV) Never done    DIABETIC FOOT EXAM  Never done    DIABETIC EYE EXAM  Never done      Immunization History   Administered Date(s) Administered    COVID-19 (JAGDEEP) 05/14/2021    TD Preservative Free (Tenivac) 03/10/2017         Part of this note may be an electronic transcription/translation of spoken language to printed   text using the Dragon Dictation System.      Mercedes Armstrong, APRN

## 2023-10-25 ENCOUNTER — PRIOR AUTHORIZATION (OUTPATIENT)
Dept: FAMILY MEDICINE CLINIC | Age: 58
End: 2023-10-25

## 2023-11-02 DIAGNOSIS — I10 PRIMARY HYPERTENSION: ICD-10-CM

## 2023-11-02 DIAGNOSIS — M17.11 PRIMARY OSTEOARTHRITIS OF RIGHT KNEE: ICD-10-CM

## 2023-11-02 DIAGNOSIS — E11.40 TYPE 2 DIABETES MELLITUS WITH DIABETIC NEUROPATHY, WITHOUT LONG-TERM CURRENT USE OF INSULIN: ICD-10-CM

## 2023-11-02 DIAGNOSIS — R19.7 DIARRHEA, UNSPECIFIED TYPE: ICD-10-CM

## 2023-11-02 RX ORDER — DICYCLOMINE HYDROCHLORIDE 10 MG/1
10 CAPSULE ORAL
Qty: 120 CAPSULE | Refills: 0 | Status: SHIPPED | OUTPATIENT
Start: 2023-11-02

## 2023-11-02 RX ORDER — CELECOXIB 200 MG/1
200 CAPSULE ORAL DAILY
Qty: 90 CAPSULE | Refills: 0 | Status: SHIPPED | OUTPATIENT
Start: 2023-11-02

## 2023-11-02 RX ORDER — LISINOPRIL 20 MG/1
20 TABLET ORAL DAILY
Qty: 90 TABLET | Refills: 0 | Status: SHIPPED | OUTPATIENT
Start: 2023-11-02

## 2023-11-02 RX ORDER — DULAGLUTIDE 0.75 MG/.5ML
0.75 INJECTION, SOLUTION SUBCUTANEOUS WEEKLY
Qty: 6 ML | Refills: 0 | Status: SHIPPED | OUTPATIENT
Start: 2023-11-02

## 2023-11-07 DIAGNOSIS — G62.9 NEUROPATHY: ICD-10-CM

## 2023-11-08 RX ORDER — GABAPENTIN 100 MG/1
CAPSULE ORAL
Qty: 120 CAPSULE | Refills: 2 | Status: SHIPPED | OUTPATIENT
Start: 2023-11-08

## 2023-11-08 RX ORDER — CETIRIZINE HYDROCHLORIDE 10 MG/1
10 TABLET ORAL DAILY
Qty: 90 TABLET | Refills: 0 | Status: SHIPPED | OUTPATIENT
Start: 2023-11-08

## 2023-12-01 DIAGNOSIS — G62.9 NEUROPATHY: ICD-10-CM

## 2023-12-01 RX ORDER — GABAPENTIN 100 MG/1
CAPSULE ORAL
Qty: 120 CAPSULE | Refills: 11 | OUTPATIENT
Start: 2023-12-01

## 2023-12-04 DIAGNOSIS — E11.40 TYPE 2 DIABETES MELLITUS WITH DIABETIC NEUROPATHY, WITHOUT LONG-TERM CURRENT USE OF INSULIN: ICD-10-CM

## 2023-12-04 DIAGNOSIS — M17.11 PRIMARY OSTEOARTHRITIS OF RIGHT KNEE: ICD-10-CM

## 2023-12-04 DIAGNOSIS — R19.7 DIARRHEA, UNSPECIFIED TYPE: ICD-10-CM

## 2023-12-04 DIAGNOSIS — I10 PRIMARY HYPERTENSION: ICD-10-CM

## 2023-12-04 RX ORDER — LISINOPRIL 20 MG/1
20 TABLET ORAL DAILY
Qty: 90 TABLET | Refills: 0 | Status: SHIPPED | OUTPATIENT
Start: 2023-12-04

## 2023-12-04 RX ORDER — CELECOXIB 200 MG/1
200 CAPSULE ORAL DAILY
Qty: 90 CAPSULE | Refills: 0 | Status: SHIPPED | OUTPATIENT
Start: 2023-12-04

## 2023-12-04 RX ORDER — DULAGLUTIDE 0.75 MG/.5ML
INJECTION, SOLUTION SUBCUTANEOUS
Qty: 6 ML | Refills: 0 | Status: SHIPPED | OUTPATIENT
Start: 2023-12-04

## 2023-12-04 RX ORDER — DICYCLOMINE HYDROCHLORIDE 10 MG/1
CAPSULE ORAL
Qty: 120 CAPSULE | Refills: 0 | Status: SHIPPED | OUTPATIENT
Start: 2023-12-04

## 2023-12-27 DIAGNOSIS — G62.9 NEUROPATHY: ICD-10-CM

## 2023-12-27 RX ORDER — GABAPENTIN 100 MG/1
CAPSULE ORAL
Qty: 120 CAPSULE | Refills: 0 | OUTPATIENT
Start: 2023-12-27

## 2024-01-11 DIAGNOSIS — E11.40 TYPE 2 DIABETES MELLITUS WITH DIABETIC NEUROPATHY, WITHOUT LONG-TERM CURRENT USE OF INSULIN: ICD-10-CM

## 2024-01-11 RX ORDER — ATORVASTATIN CALCIUM 10 MG/1
10 TABLET, FILM COATED ORAL DAILY
Qty: 90 TABLET | Refills: 0 | Status: SHIPPED | OUTPATIENT
Start: 2024-01-11

## 2024-01-12 DIAGNOSIS — G62.9 NEUROPATHY: ICD-10-CM

## 2024-01-12 DIAGNOSIS — R19.7 DIARRHEA, UNSPECIFIED TYPE: ICD-10-CM

## 2024-01-15 ENCOUNTER — LAB (OUTPATIENT)
Dept: LAB | Facility: HOSPITAL | Age: 59
End: 2024-01-15
Payer: COMMERCIAL

## 2024-01-15 ENCOUNTER — OFFICE VISIT (OUTPATIENT)
Dept: FAMILY MEDICINE CLINIC | Age: 59
End: 2024-01-15
Payer: COMMERCIAL

## 2024-01-15 VITALS
TEMPERATURE: 98.6 F | BODY MASS INDEX: 43.69 KG/M2 | SYSTOLIC BLOOD PRESSURE: 126 MMHG | DIASTOLIC BLOOD PRESSURE: 69 MMHG | WEIGHT: 295 LBS | HEIGHT: 69 IN | HEART RATE: 101 BPM | OXYGEN SATURATION: 94 %

## 2024-01-15 DIAGNOSIS — R12 HEARTBURN: ICD-10-CM

## 2024-01-15 DIAGNOSIS — E11.40 TYPE 2 DIABETES MELLITUS WITH DIABETIC NEUROPATHY, WITHOUT LONG-TERM CURRENT USE OF INSULIN: ICD-10-CM

## 2024-01-15 DIAGNOSIS — Z79.899 HIGH RISK MEDICATION USE: Primary | ICD-10-CM

## 2024-01-15 LAB
ALBUMIN SERPL-MCNC: 4.7 G/DL (ref 3.5–5.2)
ALBUMIN/GLOB SERPL: 1.9 G/DL
ALP SERPL-CCNC: 95 U/L (ref 39–117)
ALT SERPL W P-5'-P-CCNC: 64 U/L (ref 1–41)
AMPHET+METHAMPHET UR QL: NEGATIVE
AMPHETAMINES UR QL: NEGATIVE
ANION GAP SERPL CALCULATED.3IONS-SCNC: 12 MMOL/L (ref 5–15)
AST SERPL-CCNC: 58 U/L (ref 1–40)
BARBITURATES UR QL SCN: NEGATIVE
BENZODIAZ UR QL SCN: NEGATIVE
BILIRUB SERPL-MCNC: 0.4 MG/DL (ref 0–1.2)
BUN SERPL-MCNC: 13 MG/DL (ref 6–20)
BUN/CREAT SERPL: 10.2 (ref 7–25)
BUPRENORPHINE SERPL-MCNC: NEGATIVE NG/ML
CALCIUM SPEC-SCNC: 10.1 MG/DL (ref 8.6–10.5)
CANNABINOIDS SERPL QL: NEGATIVE
CHLORIDE SERPL-SCNC: 99 MMOL/L (ref 98–107)
CO2 SERPL-SCNC: 25 MMOL/L (ref 22–29)
COCAINE UR QL: NEGATIVE
CREAT SERPL-MCNC: 1.28 MG/DL (ref 0.76–1.27)
EGFRCR SERPLBLD CKD-EPI 2021: 65.3 ML/MIN/1.73
EXPIRATION DATE: NORMAL
GLOBULIN UR ELPH-MCNC: 2.5 GM/DL
GLUCOSE SERPL-MCNC: 281 MG/DL (ref 65–99)
HBA1C MFR BLD: 8 % (ref 4.8–5.6)
LIPASE SERPL-CCNC: 47 U/L (ref 13–60)
Lab: NORMAL
MDMA UR QL SCN: NEGATIVE
METHADONE UR QL SCN: NEGATIVE
MORPHINE/OPIATES SCREEN, URINE: NEGATIVE
OXYCODONE UR QL SCN: NEGATIVE
PCP UR QL SCN: NEGATIVE
POTASSIUM SERPL-SCNC: 4.1 MMOL/L (ref 3.5–5.2)
PROT SERPL-MCNC: 7.2 G/DL (ref 6–8.5)
SODIUM SERPL-SCNC: 136 MMOL/L (ref 136–145)

## 2024-01-15 PROCEDURE — 80053 COMPREHEN METABOLIC PANEL: CPT

## 2024-01-15 PROCEDURE — 36415 COLL VENOUS BLD VENIPUNCTURE: CPT

## 2024-01-15 PROCEDURE — 83036 HEMOGLOBIN GLYCOSYLATED A1C: CPT

## 2024-01-15 PROCEDURE — 83690 ASSAY OF LIPASE: CPT

## 2024-01-15 RX ORDER — DICYCLOMINE HYDROCHLORIDE 10 MG/1
CAPSULE ORAL
Qty: 120 CAPSULE | Refills: 0 | Status: SHIPPED | OUTPATIENT
Start: 2024-01-15

## 2024-01-15 RX ORDER — GABAPENTIN 100 MG/1
CAPSULE ORAL
Qty: 120 CAPSULE | Refills: 5 | Status: SHIPPED | OUTPATIENT
Start: 2024-01-15

## 2024-01-15 NOTE — PROGRESS NOTES
Chief Complaint  Anthony Brown presents to [facility] for Diabetes, Heartburn, and Pain (neuropathy)      Subjective     History of Present Illness    Diabetes type:  Type 2    A1C Last 3 Results          4/18/2023    08:44 10/13/2023    07:10 1/15/2024    14:48   HGBA1C Last 3 Results   Hemoglobin A1C 5.90  7.50  8.00          Current diabetic medications include Trulicity(dulaglutide)  He has lost some weight while taking but would prefer to try alterantive due to the nausea/heartburn since starting Trulicity  I did discuss that this is potential for all medications in this class . He is aware and is willing to try alternative     Diabetic Review of Systems:  Medication compliance: compliant all of the time  Diabetic diet compliance: noncompliant some of the time  Blood sugar log: not checked  Any hypoglycemic episodes? - No    Is He on ACE inhibitor or angiotensin II receptor blocker and a statin? Lisinopril    atorvastatin (lipitor)  He has been taking Trulicity and feels that this is causing some GI symptoms - Heartburn has been worsening over the past few months since starting the Trulicity.  He reports that his neuropathy is overall stable and still has some discomfort but not at extreme as previous.  He feels that the gabapentin has helped and feels that he desires to continue to help keep this at a manageable level.    Controlled substance documentation: Patrick reviewed; prior drug screen consistent; consent is reviewed and signed and on the chart. Jeremy  is aware of risk of addiction on this medication, understands that he will need to follow up for a review every 3 months / or as deemed appropriate.   his medications will be adjusted or decreased as deemed appropriate at each visit and aware that pill counts may be requested at any time.  Denies  history of drug or alcohol abuse.  No concerns about diversion or abuse. He denies side effects related to the medication.     "The dosing of this medication will be reviewed on a regular basis and reduced if possible..  Ongoing use of a controlled substance is necessary for this patient to have a normal quality of life .                 Assessment and Plan       Diagnoses and all orders for this visit:    1. High risk medication use (Primary)  -     POC Medline 12 Panel Urine Drug Screen    2. Type 2 diabetes mellitus with diabetic neuropathy, without long-term current use of insulin  Comments:  change from Trulicity to Ozempic to see if symptoms change  advise may be in all medications of this class- he will follow up if persists;  gabapentin refills  Orders:  -     Hemoglobin A1c; Future  -     Comprehensive metabolic panel; Future    3. Heartburn  Comments:  continue omeprazole and will determine if improves with medicaiton change.  follow up if persists  Orders:  -     Lipase; Future            Follow Up   Return in about 3 months (around 4/15/2024) for Recheck.  Future Appointments   Date Time Provider Department Center   4/19/2024  9:30 AM Mercedes Armstrong APRN [facility]   7/11/2024  8:00 AM Hanna Bethea APRN [facility]       No orders of the defined types were placed in this encounter.      Medications Discontinued During This Encounter   Medication Reason    Turmeric 500 MG capsule *Therapy completed          Review of Systems    Objective     Vitals:    01/15/24 1341   BP: 126/69   BP Location: Right arm   Patient Position: Sitting   Cuff Size: Adult   Pulse: 101   Temp: 98.6 °F (37 °C)   TempSrc: Oral   SpO2: 94%   Weight: 134 kg (295 lb)   Height: 175.3 cm (69.02\")     Body mass index is 43.54 kg/m².          Physical Exam  Vitals reviewed.   Constitutional:       Appearance: Normal appearance. He is obese.   HENT:      Head: Normocephalic.   Eyes:      Pupils: Pupils are equal, round, and reactive to light.   Cardiovascular:      Rate and Rhythm: Normal rate and regular rhythm.      Heart sounds: No murmur " heard.  Pulmonary:      Effort: Pulmonary effort is normal.      Breath sounds: Normal breath sounds.   Musculoskeletal:         General: Normal range of motion.   Neurological:      Mental Status: He is alert.   Psychiatric:         Mood and Affect: Mood normal.         Behavior: Behavior normal.            Result Review               No Known Allergies   Past Medical History:   Diagnosis Date    Acute URI     Arthritis     bialteral knees    Abreu's esophagus with dysplasia, unspecified     Bipolar disorder     Callus Always    Chronic pain disorder     Colon polyp 2019?    Contact with and (suspected) exposure to other viral communicable diseases     Depression     Deviated nasal septum     Difficulty walking Feb 2022    Random pain    GERD (gastroesophageal reflux disease) 2008    High cholesterol     History of chest pain     6 YEARS AGO, HAD STRESS TEST NORMAL    Hypertension     Ingrown toenail Childhood    Low back pain     Low testosterone level in male     HAS TESTOSTERONE PELLETS IMPLANTED    Numbness in feet     Obesity     Pain in joint     UNSPECIFIED    Plantar fasciitis 1998    No other occurrence    Shin splints Random over years    During or after exercise    Sinusitis     Sleep apnea with use of continuous positive airway pressure (CPAP)     Sleep apnea, unspecified     SOB (shortness of breath)     Testicular hypofunction     Testosterone deficiency      Current Outpatient Medications   Medication Sig Dispense Refill    atorvastatin (LIPITOR) 10 MG tablet Take 1 tablet by mouth Daily. 90 tablet 0    celecoxib (CeleBREX) 200 MG capsule TAKE 1 CAPSULE BY MOUTH DAILY 90 capsule 0    cetirizine (zyrTEC) 10 MG tablet Take 1 tablet by mouth Daily. 90 tablet 0    Cholecalciferol (VITAMIN D3) 5000 UNITS capsule capsule Take 2,000 Units by mouth Daily.      colestipol (COLESTID) 1 g tablet TAKE 1 TABLET BY MOUTH TWICE  DAILY WITH MEALS 180 tablet 3    dicyclomine (BENTYL) 10 MG capsule TAKE 1 CAPSULE BY  MOUTH 4 TIMES  DAILY BEFORE MEALS AND AT  BEDTIME 120 capsule 0    DULoxetine (Cymbalta) 20 MG capsule Take 1 capsule by mouth Daily for 360 days. Indications: Fibromyalgia Syndrome, Generalized Anxiety Disorder, Major Depressive Disorder 90 capsule 3    fenofibrate 160 MG tablet TAKE 1 TABLET BY MOUTH DAILY 90 tablet 3    fluticasone (FLONASE) 50 MCG/ACT nasal spray Use 2 sprays into the nostril(s) as directed by provider Daily. 16 g 3    gabapentin (NEURONTIN) 100 MG capsule TAKE 1 CAPSULE BY MOUTH EVERY  MORNING AND 3 CAPSULES EVERY  NIGHT 120 capsule 5    lamoTRIgine (LaMICtal) 200 MG tablet Take 1 tablet by mouth Every Morning. 90 tablet 3    lisinopril (PRINIVIL,ZESTRIL) 20 MG tablet TAKE 1 TABLET BY MOUTH DAILY 90 tablet 0    multivitamin with minerals tablet tablet Take 1 tablet by mouth Daily.      omeprazole (priLOSEC) 20 MG capsule TAKE 1 CAPSULE BY MOUTH DAILY 90 capsule 3    Probiotic Product (PROBIOTIC PO) Take 1 capsule by mouth Daily. PT TO HOLD MED PRIOR TO OR      vitamin B-12 (CYANOCOBALAMIN) 1000 MCG tablet Take 5 tablets by mouth Daily.      Semaglutide,0.25 or 0.5MG/DOS, (Ozempic, 0.25 or 0.5 MG/DOSE,) 2 MG/1.5ML solution pen-injector 0.25mg SC Weekly x 4 weeks then increase dose to 0.5mg SC weekly DISCONTINUE TRULICITY 6 mL 0     No current facility-administered medications for this visit.     Past Surgical History:   Procedure Laterality Date    COLONOSCOPY  2019?    HERNIA REPAIR      JOINT REPLACEMENT  June 2017    Right knee    KNEE SURGERY Right 1985    [place] ARTHRP KNE CONDYLE&PLATU MEDIAL&LAT COMPARTMENTS N/A 6/26/2017    Procedure: RIGHT TOTAL KNEE ARTHROPLASTY, LEFT KNEE INJECTED WITH CORTISONE;  Surgeon: Bradford Torres MD;  Location: [facility];  Service: Orthopedics    TONSILLECTOMY  1970    VASECTOMY  1998      Health Maintenance Due   Topic Date Due    Hepatitis B (1 of 3 - 3-dose series) Never done    URINE MICROALBUMIN  Never done    DIABETIC FOOT EXAM  Never done       Immunization History   Administered Date(s) Administered    COVID-19 (JAGDEEP) 05/14/2021    TD Preservative Free (Tenivac) 03/10/2017         Part of this note may be an electronic transcription/translation of spoken language to printed   text using the Dragon Dictation System.      RAY Morrell

## 2024-01-19 DIAGNOSIS — E11.40 TYPE 2 DIABETES MELLITUS WITH DIABETIC NEUROPATHY, WITHOUT LONG-TERM CURRENT USE OF INSULIN: Primary | ICD-10-CM

## 2024-01-19 RX ORDER — SEMAGLUTIDE 1.34 MG/ML
INJECTION, SOLUTION SUBCUTANEOUS
Qty: 6 ML | Refills: 0 | Status: SHIPPED | OUTPATIENT
Start: 2024-01-19

## 2024-01-23 ENCOUNTER — PRIOR AUTHORIZATION (OUTPATIENT)
Dept: FAMILY MEDICINE CLINIC | Age: 59
End: 2024-01-23
Payer: COMMERCIAL

## 2024-02-01 RX ORDER — CETIRIZINE HYDROCHLORIDE 10 MG/1
10 TABLET ORAL DAILY
Qty: 90 TABLET | Refills: 0 | Status: SHIPPED | OUTPATIENT
Start: 2024-02-01

## 2024-02-02 RX ORDER — CETIRIZINE HYDROCHLORIDE 10 MG/1
10 TABLET ORAL DAILY
Qty: 90 TABLET | Refills: 0 | OUTPATIENT
Start: 2024-02-02

## 2024-02-22 DIAGNOSIS — R19.7 DIARRHEA, UNSPECIFIED TYPE: ICD-10-CM

## 2024-02-22 DIAGNOSIS — E11.40 TYPE 2 DIABETES MELLITUS WITH DIABETIC NEUROPATHY, WITHOUT LONG-TERM CURRENT USE OF INSULIN: ICD-10-CM

## 2024-02-22 RX ORDER — ATORVASTATIN CALCIUM 10 MG/1
10 TABLET, FILM COATED ORAL DAILY
Qty: 90 TABLET | Refills: 3 | OUTPATIENT
Start: 2024-02-22

## 2024-02-23 RX ORDER — DICYCLOMINE HYDROCHLORIDE 10 MG/1
CAPSULE ORAL
Qty: 120 CAPSULE | Refills: 0 | Status: SHIPPED | OUTPATIENT
Start: 2024-02-23

## 2024-03-07 DIAGNOSIS — G62.9 NEUROPATHY: ICD-10-CM

## 2024-03-07 RX ORDER — GABAPENTIN 100 MG/1
CAPSULE ORAL
Qty: 120 CAPSULE | Refills: 5 | OUTPATIENT
Start: 2024-03-07

## 2024-03-27 DIAGNOSIS — E11.40 TYPE 2 DIABETES MELLITUS WITH DIABETIC NEUROPATHY, WITHOUT LONG-TERM CURRENT USE OF INSULIN: ICD-10-CM

## 2024-03-27 RX ORDER — SEMAGLUTIDE 0.68 MG/ML
INJECTION, SOLUTION SUBCUTANEOUS
Qty: 9 ML | Refills: 3 | OUTPATIENT
Start: 2024-03-27

## 2024-03-31 DIAGNOSIS — E11.40 TYPE 2 DIABETES MELLITUS WITH DIABETIC NEUROPATHY, WITHOUT LONG-TERM CURRENT USE OF INSULIN: ICD-10-CM

## 2024-04-01 RX ORDER — ATORVASTATIN CALCIUM 10 MG/1
10 TABLET, FILM COATED ORAL DAILY
Qty: 90 TABLET | Refills: 0 | Status: SHIPPED | OUTPATIENT
Start: 2024-04-01

## 2024-04-15 DIAGNOSIS — Z13.6 SCREENING FOR CARDIOVASCULAR CONDITION: ICD-10-CM

## 2024-04-15 DIAGNOSIS — K76.0 FATTY LIVER: ICD-10-CM

## 2024-04-15 DIAGNOSIS — E11.40 TYPE 2 DIABETES MELLITUS WITH DIABETIC NEUROPATHY, WITHOUT LONG-TERM CURRENT USE OF INSULIN: Primary | ICD-10-CM

## 2024-04-15 DIAGNOSIS — E78.00 HIGH CHOLESTEROL: ICD-10-CM

## 2024-04-17 ENCOUNTER — LAB (OUTPATIENT)
Dept: LAB | Facility: HOSPITAL | Age: 59
End: 2024-04-17
Payer: COMMERCIAL

## 2024-04-17 DIAGNOSIS — K76.0 FATTY LIVER: ICD-10-CM

## 2024-04-17 DIAGNOSIS — Z13.6 SCREENING FOR CARDIOVASCULAR CONDITION: ICD-10-CM

## 2024-04-17 DIAGNOSIS — E78.00 HIGH CHOLESTEROL: ICD-10-CM

## 2024-04-17 DIAGNOSIS — E11.40 TYPE 2 DIABETES MELLITUS WITH DIABETIC NEUROPATHY, WITHOUT LONG-TERM CURRENT USE OF INSULIN: ICD-10-CM

## 2024-04-17 LAB
ALBUMIN SERPL-MCNC: 4.2 G/DL (ref 3.5–5.2)
ALBUMIN/GLOB SERPL: 1.6 G/DL
ALP SERPL-CCNC: 101 U/L (ref 39–117)
ALT SERPL W P-5'-P-CCNC: 45 U/L (ref 1–41)
ANION GAP SERPL CALCULATED.3IONS-SCNC: 10.7 MMOL/L (ref 5–15)
AST SERPL-CCNC: 34 U/L (ref 1–40)
BILIRUB SERPL-MCNC: 0.3 MG/DL (ref 0–1.2)
BUN SERPL-MCNC: 15 MG/DL (ref 6–20)
BUN/CREAT SERPL: 14.3 (ref 7–25)
CALCIUM SPEC-SCNC: 9.7 MG/DL (ref 8.6–10.5)
CHLORIDE SERPL-SCNC: 103 MMOL/L (ref 98–107)
CHOLEST SERPL-MCNC: 181 MG/DL (ref 0–200)
CO2 SERPL-SCNC: 27.3 MMOL/L (ref 22–29)
CREAT SERPL-MCNC: 1.05 MG/DL (ref 0.76–1.27)
EGFRCR SERPLBLD CKD-EPI 2021: 82.8 ML/MIN/1.73
GLOBULIN UR ELPH-MCNC: 2.7 GM/DL
GLUCOSE SERPL-MCNC: 107 MG/DL (ref 65–99)
HBA1C MFR BLD: 5.7 % (ref 4.8–5.6)
HDLC SERPL-MCNC: 21 MG/DL (ref 40–60)
LDLC SERPL CALC-MCNC: 90 MG/DL (ref 0–100)
LDLC/HDLC SERPL: 3.58 {RATIO}
POTASSIUM SERPL-SCNC: 3.6 MMOL/L (ref 3.5–5.2)
PROT SERPL-MCNC: 6.9 G/DL (ref 6–8.5)
SODIUM SERPL-SCNC: 141 MMOL/L (ref 136–145)
TRIGL SERPL-MCNC: 424 MG/DL (ref 0–150)
VLDLC SERPL-MCNC: 70 MG/DL (ref 5–40)

## 2024-04-17 PROCEDURE — 36415 COLL VENOUS BLD VENIPUNCTURE: CPT

## 2024-04-17 PROCEDURE — 83036 HEMOGLOBIN GLYCOSYLATED A1C: CPT

## 2024-04-17 PROCEDURE — 80061 LIPID PANEL: CPT

## 2024-04-17 PROCEDURE — 80053 COMPREHEN METABOLIC PANEL: CPT

## 2024-04-19 ENCOUNTER — OFFICE VISIT (OUTPATIENT)
Dept: FAMILY MEDICINE CLINIC | Age: 59
End: 2024-04-19
Payer: COMMERCIAL

## 2024-04-19 VITALS
SYSTOLIC BLOOD PRESSURE: 129 MMHG | OXYGEN SATURATION: 96 % | HEIGHT: 69 IN | DIASTOLIC BLOOD PRESSURE: 66 MMHG | WEIGHT: 283 LBS | BODY MASS INDEX: 41.92 KG/M2 | TEMPERATURE: 98.3 F | HEART RATE: 61 BPM

## 2024-04-19 DIAGNOSIS — K76.0 FATTY LIVER: ICD-10-CM

## 2024-04-19 DIAGNOSIS — I10 PRIMARY HYPERTENSION: ICD-10-CM

## 2024-04-19 DIAGNOSIS — E11.40 TYPE 2 DIABETES MELLITUS WITH DIABETIC NEUROPATHY, WITHOUT LONG-TERM CURRENT USE OF INSULIN: ICD-10-CM

## 2024-04-19 DIAGNOSIS — M25.511 CHRONIC RIGHT SHOULDER PAIN: ICD-10-CM

## 2024-04-19 DIAGNOSIS — R12 HEARTBURN: ICD-10-CM

## 2024-04-19 DIAGNOSIS — Z79.899 HIGH RISK MEDICATION USE: Primary | ICD-10-CM

## 2024-04-19 DIAGNOSIS — G89.29 CHRONIC RIGHT SHOULDER PAIN: ICD-10-CM

## 2024-04-19 LAB
AMPHET+METHAMPHET UR QL: NEGATIVE
AMPHETAMINES UR QL: NEGATIVE
BARBITURATES UR QL SCN: NEGATIVE
BENZODIAZ UR QL SCN: NEGATIVE
BUPRENORPHINE SERPL-MCNC: NEGATIVE NG/ML
CANNABINOIDS SERPL QL: NEGATIVE
COCAINE UR QL: NEGATIVE
EXPIRATION DATE: NORMAL
Lab: NORMAL
MDMA UR QL SCN: NEGATIVE
METHADONE UR QL SCN: NEGATIVE
MORPHINE/OPIATES SCREEN, URINE: NEGATIVE
OXYCODONE UR QL SCN: NEGATIVE
PCP UR QL SCN: NEGATIVE

## 2024-04-19 RX ORDER — ATORVASTATIN CALCIUM 10 MG/1
10 TABLET, FILM COATED ORAL DAILY
Qty: 90 TABLET | Refills: 1 | Status: SHIPPED | OUTPATIENT
Start: 2024-04-19

## 2024-04-19 RX ORDER — LISINOPRIL 20 MG/1
20 TABLET ORAL DAILY
Qty: 90 TABLET | Refills: 1 | Status: SHIPPED | OUTPATIENT
Start: 2024-04-19

## 2024-04-19 NOTE — PROGRESS NOTES
Chief Complaint  Anthony Brown presents to [facility] for Diabetes, Osteoarthritis, and Diabetic Neuropathy      Subjective     History of Present Illness    Diabetes type:  Type 2    A1C Last 3 Results          10/13/2023    07:10 1/15/2024    14:48 2024    07:31   HGBA1C Last 3 Results   Hemoglobin A1C 7.50  8.00  5.70          Current diabetic medications include  none   had reactions to Trulicity and Ozempic - severe Nausea/vomiting  advised to stop and move forward based on levels.  His recent A1C down to 5.9 and he has lost a total of 12 lbs since January     Diabetic Review of Systems:  Medication compliance: not currently taking any medication   Diabetic diet compliance: compliant most of the time  he has been on Keto type diet x 2 months Going through [facility] (his health insurance)   Blood sugar lo-140s  Any hypoglycemic episodes? - No    Is He on ACE inhibitor or angiotensin II receptor blocker and a statin? Lisinopril    atorvastatin (lipitor) and fenofibrate - but nottaking atorvastatin after he stopped the GLP-1  He also stopped the fenofibrate about 2 months ago.      He is concerned that he may be at elevated risk of stroke due to father and grandfather with history TIA/strokes    He recently had an episode of syncope when he was bending over to pick something up off floor.  Feels like he was only down for a few seconds.        He continues to have this right shoulder pain.  We have investigated in depth, but he is reporting today that he sometimes will see a correlation with intake- beer/sugar.  We discussed possible GB and he willing to evaluate this.                   Assessment and Plan       Diagnoses and all orders for this visit:    1. High risk medication use (Primary)  -     POC Medline 12 Panel Urine Drug Screen    2. Fatty liver  Comments:  known fatty liver    3. Heartburn  Comments:  check RUQ US for evaluation of heartburn and shoulder pain  ?  GB dz  Orders:  -     US Abdomen Limited; Future    4. Chronic right shoulder pain  -     US Abdomen Limited; Future    5. Type 2 diabetes mellitus with diabetic neuropathy, without long-term current use of insulin  Comments:  advise to continue with weight loss and dietary changes  A1C much improved and continue to monitor  Orders:  -     atorvastatin (LIPITOR) 10 MG tablet; Take 1 tablet by mouth Daily.  Dispense: 90 tablet; Refill: 1    6. Primary hypertension  Comments:  Well controlled on Medication  continue current treatment   Orders:  -     lisinopril (PRINIVIL,ZESTRIL) 20 MG tablet; Take 1 tablet by mouth Daily.  Dispense: 90 tablet; Refill: 1            Follow Up   Return in about 6 months (around 10/19/2024) for Recheck, Annual physical.  Future Appointments   Date Time Provider Department Center   6/7/2024  1:00 PM Susy Pyle APRN [facility]   7/11/2024  8:00 AM Hanna Bethea APRN [facility]   10/23/2024  8:15 AM Mercedes Armstrong APRN [facility]       New Medications Ordered This Visit   Medications    atorvastatin (LIPITOR) 10 MG tablet     Sig: Take 1 tablet by mouth Daily.     Dispense:  90 tablet     Refill:  1     Please send a replace/new response with 90-Day Supply if appropriate to maximize member benefit. Requesting 1 year supply.    lisinopril (PRINIVIL,ZESTRIL) 20 MG tablet     Sig: Take 1 tablet by mouth Daily.     Dispense:  90 tablet     Refill:  1     Please send a replace/new response with 90-Day Supply if appropriate to maximize member benefit. Requesting 1 year supply.       Medications Discontinued During This Encounter   Medication Reason    fluorouracil (EFUDEX) 5 % cream *Therapy completed    cetirizine (zyrTEC) 10 MG tablet *Therapy completed    lisinopril (PRINIVIL,ZESTRIL) 20 MG tablet Reorder    atorvastatin (LIPITOR) 10 MG tablet Reorder          Review of Systems    Objective     Vitals:    04/19/24 0923   BP: 129/66   BP Location: Left arm   Patient  "Position: Sitting   Cuff Size: Adult   Pulse: 61   Temp: 98.3 °F (36.8 °C)   TempSrc: Oral   SpO2: 96%   Weight: 128 kg (283 lb)   Height: 175.3 cm (69.02\")     Body mass index is 41.77 kg/m².          Physical Exam  Vitals reviewed.   Constitutional:       Appearance: Normal appearance.   HENT:      Head: Normocephalic.   Eyes:      Pupils: Pupils are equal, round, and reactive to light.   Cardiovascular:      Rate and Rhythm: Normal rate and regular rhythm.      Heart sounds: No murmur heard.  Pulmonary:      Effort: Pulmonary effort is normal.      Breath sounds: Normal breath sounds.   Abdominal:      Tenderness: There is no abdominal tenderness.   Musculoskeletal:         General: Normal range of motion.   Neurological:      Mental Status: He is alert.   Psychiatric:         Mood and Affect: Mood normal.         Behavior: Behavior normal.            Result Review               No Known Allergies   Past Medical History:   Diagnosis Date    Acute URI     Arthritis     bialteral knees    Abreu's esophagus with dysplasia, unspecified     Bipolar disorder     Callus Always    Chronic pain disorder     Colon polyp 2019?    Contact with and (suspected) exposure to other viral communicable diseases     Depression     Deviated nasal septum     Difficulty walking Feb 2022    Random pain    GERD (gastroesophageal reflux disease) 2008    High cholesterol     History of chest pain     6 YEARS AGO, HAD STRESS TEST NORMAL    Hypertension     Ingrown toenail Childhood    Low back pain     Low testosterone level in male     HAS TESTOSTERONE PELLETS IMPLANTED    Numbness in feet     Obesity     Pain in joint     UNSPECIFIED    Plantar fasciitis 1998    No other occurrence    Shin splints Random over years    During or after exercise    Sinusitis     Sleep apnea with use of continuous positive airway pressure (CPAP)     Sleep apnea, unspecified     SOB (shortness of breath)     Testicular hypofunction     Testosterone " deficiency      Current Outpatient Medications   Medication Sig Dispense Refill    atorvastatin (LIPITOR) 10 MG tablet Take 1 tablet by mouth Daily. 90 tablet 1    celecoxib (CeleBREX) 200 MG capsule TAKE 1 CAPSULE BY MOUTH DAILY 90 capsule 0    Cholecalciferol (VITAMIN D3) 5000 UNITS capsule capsule Take 2,000 Units by mouth Daily.      colestipol (COLESTID) 1 g tablet TAKE 1 TABLET BY MOUTH TWICE  DAILY WITH MEALS 180 tablet 3    dicyclomine (BENTYL) 10 MG capsule TAKE 1 CAPSULE BY MOUTH 4 TIMES  DAILY BEFORE MEALS AND AT  BEDTIME 120 capsule 0    DULoxetine (Cymbalta) 20 MG capsule Take 1 capsule by mouth Daily for 360 days. Indications: Fibromyalgia Syndrome, Generalized Anxiety Disorder, Major Depressive Disorder 90 capsule 3    fenofibrate 160 MG tablet TAKE 1 TABLET BY MOUTH DAILY 90 tablet 3    fluticasone (FLONASE) 50 MCG/ACT nasal spray Use 2 sprays into the nostril(s) as directed by provider Daily. 16 g 3    gabapentin (NEURONTIN) 100 MG capsule TAKE 1 CAPSULE BY MOUTH EVERY  MORNING AND 3 CAPSULES EVERY  NIGHT 120 capsule 5    lamoTRIgine (LaMICtal) 200 MG tablet Take 1 tablet by mouth Every Morning. 90 tablet 3    lisinopril (PRINIVIL,ZESTRIL) 20 MG tablet Take 1 tablet by mouth Daily. 90 tablet 1    multivitamin with minerals tablet tablet Take 1 tablet by mouth Daily.      omeprazole (priLOSEC) 20 MG capsule TAKE 1 CAPSULE BY MOUTH DAILY 90 capsule 3    Probiotic Product (PROBIOTIC PO) Take 1 capsule by mouth Daily. PT TO HOLD MED PRIOR TO OR      vitamin B-12 (CYANOCOBALAMIN) 1000 MCG tablet Take 5 tablets by mouth Daily.       No current facility-administered medications for this visit.     Past Surgical History:   Procedure Laterality Date    COLONOSCOPY  2019?    HERNIA REPAIR      JOINT REPLACEMENT  June 2017    Right knee    KNEE SURGERY Right 1985    IA ARTHRP KNE CONDYLE&PLATU MEDIAL&LAT COMPARTMENTS N/A 6/26/2017    Procedure: RIGHT TOTAL KNEE ARTHROPLASTY, LEFT KNEE INJECTED WITH CORTISONE;   Surgeon: Bradford Torres MD;  [facility];  Service: Orthopedics    TONSILLECTOMY  1970    VASECTOMY  1998      Health Maintenance Due   Topic Date Due    URINE MICROALBUMIN  Never done    Hepatitis B (1 of 3 - 19+ 3-dose series) Never done    DIABETIC FOOT EXAM  Never done      Immunization History   Administered Date(s) Administered    COVID-19 (JAGDEEP) 05/14/2021    TD Preservative Free (Tenivac) 03/10/2017         Part of this note may be an electronic transcription/translation of spoken language to printed   text using the Dragon Dictation System.      RAY Morrell

## 2024-05-13 ENCOUNTER — HOSPITAL ENCOUNTER (OUTPATIENT)
Dept: ULTRASOUND IMAGING | Facility: HOSPITAL | Age: 59
Discharge: HOME | End: 2024-05-13
Admitting: NURSE PRACTITIONER
Payer: COMMERCIAL

## 2024-05-13 DIAGNOSIS — G89.29 CHRONIC RIGHT SHOULDER PAIN: ICD-10-CM

## 2024-05-13 DIAGNOSIS — R12 HEARTBURN: ICD-10-CM

## 2024-05-13 DIAGNOSIS — M25.511 CHRONIC RIGHT SHOULDER PAIN: ICD-10-CM

## 2024-05-13 PROCEDURE — 76705 ECHO EXAM OF ABDOMEN: CPT

## 2024-05-14 DIAGNOSIS — G62.9 NEUROPATHY: ICD-10-CM

## 2024-05-14 DIAGNOSIS — R12 HEARTBURN: ICD-10-CM

## 2024-05-14 DIAGNOSIS — K82.8 SLUDGE IN GALLBLADDER: Primary | ICD-10-CM

## 2024-05-15 RX ORDER — GABAPENTIN 100 MG/1
CAPSULE ORAL
Qty: 120 CAPSULE | Refills: 11 | OUTPATIENT
Start: 2024-05-15

## 2024-05-28 ENCOUNTER — OFFICE VISIT (OUTPATIENT)
Dept: SURGERY | Facility: CLINIC | Age: 59
End: 2024-05-28
Payer: COMMERCIAL

## 2024-05-28 VITALS — RESPIRATION RATE: 18 BRPM | WEIGHT: 281 LBS | BODY MASS INDEX: 41.62 KG/M2 | HEIGHT: 69 IN

## 2024-05-28 DIAGNOSIS — K80.20 CALCULUS OF GALLBLADDER WITHOUT CHOLECYSTITIS WITHOUT OBSTRUCTION: Primary | ICD-10-CM

## 2024-05-29 NOTE — PROGRESS NOTES
General Surgery/Colorectal Surgery Note    Patient Name:  Anthony Brown  YOB: 1965  [id number]    Referring Provider: Mercedes Armstrong APRN      Patient Care Team:  Mercedes Armstrong APRN as PCP - General (Nurse Practitioner)  Milo Deluca MD as Consulting Physician (Pulmonary Disease)  Hanna Bethea APRN as Nurse Practitioner (Behavioral Health)  Shawna Aldrich MA as Medical Assistant    Chief complaint gallstones    Subjective .     History of present illness:    Multiyear history of right axillary and shoulder pain of unknown etiology.  Symptoms occasionally worse with eating foods high in sugar and alcohol.  No nausea vomiting.  No food intolerance to fried greasy foods.  No history of ulcer disease.  No NSAID abuse.  Ultrasound right upper quadrant      History:  Past Medical History:   Diagnosis Date    Acute URI     Arthritis     bialteral knees    Abreu's esophagus with dysplasia, unspecified     Bipolar disorder     Callus Always    Chronic pain disorder     Colon polyp 2019?    Contact with and (suspected) exposure to other viral communicable diseases     Depression     Deviated nasal septum     Difficulty walking Feb 2022    Random pain    GERD (gastroesophageal reflux disease) 2008    High cholesterol     History of chest pain     6 YEARS AGO, HAD STRESS TEST NORMAL    Hypertension     Ingrown toenail Childhood    Low back pain     Low testosterone level in male     HAS TESTOSTERONE PELLETS IMPLANTED    Numbness in feet     Obesity     Pain in joint     UNSPECIFIED    Plantar fasciitis 1998    No other occurrence    Shin splints Random over years    During or after exercise    Sinusitis     Sleep apnea with use of continuous positive airway pressure (CPAP)     Sleep apnea, unspecified     SOB (shortness of breath)     Testicular hypofunction     Testosterone deficiency        Past Surgical History:   Procedure Laterality Date    COLONOSCOPY  2019?    HERNIA REPAIR      JOINT  REPLACEMENT  2017    Right knee    KNEE SURGERY Right     RI ARTHRP KNE CONDYLE&PLATU MEDIAL&LAT COMPARTMENTS N/A 2017    Procedure: RIGHT TOTAL KNEE ARTHROPLASTY, LEFT KNEE INJECTED WITH CORTISONE;  Surgeon: Bradford Torres MD;  Location: [facility] OR;  Service: Orthopedics    TONSILLECTOMY  1970    VASECTOMY  1998       Family History   Problem Relation Age of Onset    Diabetes Mother     Hypertension Mother     Neuropathy Mother     Depression Father     Colon polyps Father     Stroke Father     Skin cancer Father         unknown type    Depression Sister     Hypertension Sister     Rheum arthritis Sister     Arthritis Sister     ADD / ADHD Nephew     Malig Hyperthermia Neg Hx        Social History     Tobacco Use    Smoking status: Former     Current packs/day: 0.00     Average packs/day: 0.3 packs/day for 3.7 years (0.9 ttl pk-yrs)     Types: Cigarettes, Pipe, Cigars     Start date: 1981     Quit date: 1984     Years since quittin.7    Smokeless tobacco: Never    Tobacco comments:     smoked as a teenager   Vaping Use    Vaping status: Never Used   Substance Use Topics    Alcohol use: Yes     Alcohol/week: 3.0 standard drinks of alcohol     Types: 3 Cans of beer per week     Comment: socially    Drug use: Never     Types: Marijuana     Comment: in college       Review of Systems  All systems were reviewed and negative except for:   Review of Systems   Constitutional: Negative for chills, fever and unexpected weight loss.   HENT: Negative for congestion, nosebleeds and voice change.    Eyes: Negative for blurred vision, double vision and discharge.   Respiratory: Negative for apnea, chest tightness and shortness of breath.    Cardiovascular: Negative for chest pain and leg swelling.   Gastrointestinal:        See HPI   Endocrine: Negative for cold intolerance and heat intolerance.   Genitourinary: Negative for dysuria, hematuria and urgency.   Musculoskeletal: Negative for back pain,  joint swelling and neck pain.   Skin: Negative for color change and dry skin.   Neurological: Negative for dizziness and confusion.   Hematological: Negative for adenopathy.   Psychiatric/Behavioral: Negative for agitation and behavioral problems.     MEDS:  Prior to Admission medications    Medication Sig Start Date End Date Taking? Authorizing Provider   atorvastatin (LIPITOR) 10 MG tablet Take 1 tablet by mouth Daily. 4/19/24  Yes Mercedes Armstrong APRN   celecoxib (CeleBREX) 200 MG capsule TAKE 1 CAPSULE BY MOUTH DAILY 12/4/23  Yes Mercedes Armstrong APRN   Cholecalciferol (VITAMIN D3) 5000 UNITS capsule capsule Take 2,000 Units by mouth Daily.   Yes ProviderMolly MD   colestipol (COLESTID) 1 g tablet TAKE 1 TABLET BY MOUTH TWICE  DAILY WITH MEALS 10/10/23  Yes Mercedes Armstrong APRN   dicyclomine (BENTYL) 10 MG capsule TAKE 1 CAPSULE BY MOUTH 4 TIMES  DAILY BEFORE MEALS AND AT  BEDTIME 2/23/24  Yes Mercedes Armstrong APRN   DULoxetine (Cymbalta) 20 MG capsule Take 1 capsule by mouth Daily for 360 days. Indications: Fibromyalgia Syndrome, Generalized Anxiety Disorder, Major Depressive Disorder 8/21/23 8/15/24 Yes Hanna Bethea APRN   fenofibrate 160 MG tablet TAKE 1 TABLET BY MOUTH DAILY 10/16/23  Yes Mercedes Armstrong APRN   fluticasone (FLONASE) 50 MCG/ACT nasal spray Use 2 sprays into the nostril(s) as directed by provider Daily. 9/11/23  Yes Ha Kulkarni MD   gabapentin (NEURONTIN) 100 MG capsule TAKE 1 CAPSULE BY MOUTH EVERY  MORNING AND 3 CAPSULES EVERY  NIGHT 1/15/24  Yes Mercedes Armstrong APRN   lamoTRIgine (LaMICtal) 200 MG tablet Take 1 tablet by mouth Every Morning. 9/6/23  Yes Hanna Bethea APRN   lisinopril (PRINIVIL,ZESTRIL) 20 MG tablet Take 1 tablet by mouth Daily. 4/19/24  Yes Mercedes Armstrong APRN   multivitamin with minerals tablet tablet Take 1 tablet by mouth Daily.   Yes ProviderMolly MD   omeprazole (priLOSEC) 20 MG capsule TAKE 1 CAPSULE BY MOUTH DAILY 10/16/23  Yes Patti  "RAY Long   Probiotic Product (PROBIOTIC PO) Take 1 capsule by mouth Daily. PT TO HOLD MED PRIOR TO OR   Yes Provider, MD Molly   vitamin B-12 (CYANOCOBALAMIN) 1000 MCG tablet Take 5 tablets by mouth Daily.   Yes Provider, MD Molly        Allergies:  Patient has no known allergies.    Objective     Vital Signs   Resp:  [18] 18    Physical Exam:     General Appearance:    Alert, cooperative, in no acute distress   Head:    Normocephalic, without obvious abnormality, atraumatic   Eyes:          Conjunctivae and sclerae normal, no icterus,     Ears:    Ears appear intact with no abnormalities noted   Throat:   No oral lesions, no thrush, oral mucosa moist   Neck:   No adenopathy, supple, trachea midline, no thyromegaly   Back:     No kyphosis present, no scoliosis present, no skin lesions,      erythema or scars, no tenderness to percussion or                   palpation,   range of motion normal   Lungs:     Clear to auscultation,respirations regular, even and                  unlabored    Heart:    Regular rhythm and normal rate, normal S1 and S2, no            murmur, no gallop, no rub, no click   Chest Wall:    No abnormalities observed   Abdomen:     Normal bowel sounds, no masses, no organomegaly, soft        non-tender, non-distended, no guarding, no rebound                tenderness   Rectal:        Extremities:   Moves all extremities well, no edema, no cyanosis, no             redness   Pulses:   Pulses palpable and equal bilaterally   Skin:   No bleeding, bruising or rash   Lymph nodes:   No palpable adenopathy   Neurologic:   A/o x 4 with no deficits       Results Review:   {Results Review:17500::\"I reviewed the patient's new clinical results.\"    LABS/IMAGING:  Results for orders placed or performed in visit on 04/19/24   POC Medline 12 Panel Urine Drug Screen    Specimen: Urine   Result Value Ref Range    Amphetamine Screen, Urine Negative Negative    Barbiturates Screen, Urine Negative " Negative    Buprenorphine, Screen, Urine Negative Negative    Benzodiazepine Screen, Urine Negative Negative    Cocaine Screen, Urine Negative Negative    MDMA (ECSTASY) Negative Negative    Methamphetamine, Ur Negative Negative    Morphine/Opiates Screen, Urine Negative Negative    Methadone Screen, Urine Negative Negative    Oxycodone Screen, Urine Negative Negative    Phencyclidine (PCP), Urine Negative Negative    THC, Screen, Urine Negative Negative    Lot Number [id number]     Expiration Date 11/12/25         Result Review :     Assessment & Plan     Cholelithiasis without evidence of obstruction, gangrene, cholecystitis    Discussion with patient.  I reviewed his imaging with results mentioned above.  With the patient asymptomatic, I do not recommend cholecystectomy.  I explained him I do not think removing his gallbladder would help his shoulder pain.  If he develops right upper quadrant pain worse with food intake such as fried greasy foods or nausea follow-up with me to discuss surgery.  All questions answered.  He agrees with the plan.  Thank for the consult.              This document has been electronically signed by Donavan Flor MD  May 29, 2024 07:51 EDT

## 2024-06-07 ENCOUNTER — PATIENT ROUNDING (BHMG ONLY) (OUTPATIENT)
Dept: DIABETES SERVICES | Facility: HOSPITAL | Age: 59
End: 2024-06-07
Payer: COMMERCIAL

## 2024-06-07 ENCOUNTER — OFFICE VISIT (OUTPATIENT)
Dept: DIABETES SERVICES | Facility: CLINIC | Age: 59
End: 2024-06-07
Payer: COMMERCIAL

## 2024-06-07 VITALS
WEIGHT: 277 LBS | HEIGHT: 69 IN | DIASTOLIC BLOOD PRESSURE: 79 MMHG | OXYGEN SATURATION: 99 % | BODY MASS INDEX: 41.03 KG/M2 | HEART RATE: 68 BPM | SYSTOLIC BLOOD PRESSURE: 137 MMHG

## 2024-06-07 DIAGNOSIS — N18.2 TYPE 2 DIABETES MELLITUS WITH STAGE 2 CHRONIC KIDNEY DISEASE, WITHOUT LONG-TERM CURRENT USE OF INSULIN: ICD-10-CM

## 2024-06-07 DIAGNOSIS — E66.01 SEVERE OBESITY (BMI >= 40): ICD-10-CM

## 2024-06-07 DIAGNOSIS — E11.42 CONTROLLED TYPE 2 DIABETES MELLITUS WITH DIABETIC POLYNEUROPATHY, WITHOUT LONG-TERM CURRENT USE OF INSULIN: Primary | ICD-10-CM

## 2024-06-07 DIAGNOSIS — E11.22 TYPE 2 DIABETES MELLITUS WITH STAGE 2 CHRONIC KIDNEY DISEASE, WITHOUT LONG-TERM CURRENT USE OF INSULIN: ICD-10-CM

## 2024-06-07 LAB
EXPIRATION DATE: ABNORMAL
HBA1C MFR BLD: 6.3 % (ref 4.5–5.7)
Lab: ABNORMAL

## 2024-06-07 PROCEDURE — 99214 OFFICE O/P EST MOD 30 MIN: CPT | Performed by: NURSE PRACTITIONER

## 2024-06-07 NOTE — PROGRESS NOTES
June 7, 2024    Hello, may I speak with Anthony OCONNOR English?    My name is Mary      I am  with [facility]  [address] [place] [address] [place] [address] [phone].    Before we get started may I verify your date of birth? 1965    I am calling to officially welcome you to our practice and ask about your recent visit. Is this a good time to talk? yes    Tell me about your visit with us. What things went well?  You are very nice and so welcoming. Susy and Lake are wonderful too. Im looking forward to my next appt and seeing how my A1c has come down.        We're always looking for ways to make our patients' experiences even better. Do you have recommendations on ways we may improve?  no    Overall were you satisfied with your first visit to our practice? yes       I appreciate you taking the time to speak with me today. Is there anything else I can do for you? no      Thank you, and have a great day.

## 2024-06-07 NOTE — PROGRESS NOTES
"Chief Complaint  Diabetes (New pt, est care, med mgt, A1c eval, )    Referred By: RAY Morrell presents to [facility] for diabetes medication management    History of Present Illness    Visit type:  to establish care  Diabetes type:  Type 2  Age at time of dx/Year of dx/Number of years:  October 2023; prediabetes prior to this  Family History of Diabetes: mother  Current diabetes status/concerns/issues:  He has been referred to our office by his primary care provider for assistance in managing his diabetes.  The patient will was initially diagnosed with prediabetes according to labs.  Then in October 2023 his A1c reached 7.5% indicating type 2 diabetes.  Patient states he was started initially on Trulicity for treatment but could not tolerate the medication due to GI distress.  They tried to transition him to Ozempic but he had worsening GI distress.  He is currently not taking any diabetes medications.  Other current health concerns: He has had long COVID manifested by urgency with diarrhea and fatigue  Current Diabetes symptoms:    Polyuria: Yes some urgency more so over the last few months   Polydipsia: No   Polyphagia: No   Blurred vision: No   Excessive fatigue: Yes \"lot of fatigue\"; poor exercise intolerance  Known Diabetes complications:  Neuropathy: Tingling and Shooting Pain; Location: Feet and Hands  Renal: Stage II mild (GFR = 60-89 mL/min)  Eyes: No Retinopathy reported on current eye exam; Location: N/A; Last Eye Exam:  January 2024 ; Location: [facility]  Amputation/Wounds: None  GI: Diarrhea and Other: Abreu's esophagus  Cardiovascular: Hypertension and Hyperlipidemia  ED: None  Other: None  Hospitalizations/ED/911 secondary to DM?  No  Hypoglycemia:  None reported at this time  Hypoglycemia Symptoms:  No hypoglycemia at this time  Current Diabetes treatment: No current medications; he is attempting to manage through " diet currently  Prior diabetes treatments: Trulicity, Ozempic which were both stopped due to GI intolerance  Using ACEI or ARB: Yes, lisinopril, Managed by other provider  Using Statin: Yes, atorvastatin, Managed by other provider  Blood glucose device:  Meter  Blood glucose monitoring frequency:  Random/occasional  Blood glucose range/average:   130-175  Glucose Source: Patient Reported  Dietary behavior:  Limits high carb/sweet foods, Avoids sugary drinks, Number of meals each day - 2-3; Number of snacks each day - occasional; he does admit to an occasional concentrated sweet  Activity/Exercise:  None  Last Foot Exam:  He has had nerve conduction studies done  Diabetes Education Hx: None  Social Determinants of Health: None    Past Medical History:   Diagnosis Date    Acute URI     Arthritis     bialteral knees    Abreu's esophagus with dysplasia, unspecified     Bipolar disorder     Callus Always    Chronic pain disorder     Colon polyp 2019?    Contact with and (suspected) exposure to other viral communicable diseases     Depression     Deviated nasal septum     Difficulty walking Feb 2022    Random pain    GERD (gastroesophageal reflux disease) 2008    High cholesterol     History of chest pain     6 YEARS AGO, HAD STRESS TEST NORMAL    Hypertension     Ingrown toenail Childhood    Low back pain     Low testosterone level in male     HAS TESTOSTERONE PELLETS IMPLANTED    Numbness in feet     Obesity     Pain in joint     UNSPECIFIED    Plantar fasciitis 1998    No other occurrence    Shin splints Random over years    During or after exercise    Sinusitis     Sleep apnea with use of continuous positive airway pressure (CPAP)     Sleep apnea, unspecified     SOB (shortness of breath)     Testicular hypofunction     Testosterone deficiency      Past Surgical History:   Procedure Laterality Date    COLONOSCOPY  2019?    HERNIA REPAIR      Inguinal    JOINT REPLACEMENT  06/2017    Right knee    KNEE SURGERY Right  1985    MT ARTHRP KNE CONDYLE&PLATU MEDIAL&LAT COMPARTMENTS N/A 2017    Procedure: RIGHT TOTAL KNEE ARTHROPLASTY, LEFT KNEE INJECTED WITH CORTISONE;  Surgeon: Bradford Torres MD;  Location: [facility] OR;  Service: Orthopedics    TONSILLECTOMY  1970    VASECTOMY       Family History   Problem Relation Age of Onset    Diabetes Mother     Hypertension Mother     Neuropathy Mother     Depression Father     Colon polyps Father     Stroke Father     Skin cancer Father         unknown type    Depression Sister     Hypertension Sister     Rheum arthritis Sister     Arthritis Sister     ADD / ADHD Nephew     Malig Hyperthermia Neg Hx      Social History     Socioeconomic History    Marital status:     Number of children: 2   Tobacco Use    Smoking status: Former     Current packs/day: 0.00     Average packs/day: 0.3 packs/day for 3.7 years (0.9 ttl pk-yrs)     Types: Cigarettes, Pipe, Cigars     Start date: 1981     Quit date: 1984     Years since quittin.7    Smokeless tobacco: Never    Tobacco comments:     smoked as a teenager   Vaping Use    Vaping status: Never Used   Substance and Sexual Activity    Alcohol use: Yes     Alcohol/week: 3.0 standard drinks of alcohol     Types: 3 Cans of beer per week     Comment: socially    Drug use: Never     Types: Marijuana     Comment: in college    Sexual activity: Yes     Partners: Female     Birth control/protection: Post-menopausal, Surgical     No Known Allergies    Current Outpatient Medications:     atorvastatin (LIPITOR) 10 MG tablet, Take 1 tablet by mouth Daily., Disp: 90 tablet, Rfl: 1    celecoxib (CeleBREX) 200 MG capsule, TAKE 1 CAPSULE BY MOUTH DAILY, Disp: 90 capsule, Rfl: 0    Cholecalciferol (VITAMIN D3) 5000 UNITS capsule capsule, Take 2,000 Units by mouth Daily., Disp: , Rfl:     colestipol (COLESTID) 1 g tablet, TAKE 1 TABLET BY MOUTH TWICE  DAILY WITH MEALS, Disp: 180 tablet, Rfl: 3    dicyclomine (BENTYL) 10 MG capsule, TAKE 1  "CAPSULE BY MOUTH 4 TIMES  DAILY BEFORE MEALS AND AT  BEDTIME, Disp: 120 capsule, Rfl: 0    DULoxetine (Cymbalta) 20 MG capsule, Take 1 capsule by mouth Daily for 360 days. Indications: Fibromyalgia Syndrome, Generalized Anxiety Disorder, Major Depressive Disorder, Disp: 90 capsule, Rfl: 3    fenofibrate 160 MG tablet, TAKE 1 TABLET BY MOUTH DAILY, Disp: 90 tablet, Rfl: 3    fluticasone (FLONASE) 50 MCG/ACT nasal spray, Use 2 sprays into the nostril(s) as directed by provider Daily., Disp: 16 g, Rfl: 3    gabapentin (NEURONTIN) 100 MG capsule, TAKE 1 CAPSULE BY MOUTH EVERY  MORNING AND 3 CAPSULES EVERY  NIGHT, Disp: 120 capsule, Rfl: 5    lamoTRIgine (LaMICtal) 200 MG tablet, Take 1 tablet by mouth Every Morning., Disp: 90 tablet, Rfl: 3    lisinopril (PRINIVIL,ZESTRIL) 20 MG tablet, Take 1 tablet by mouth Daily., Disp: 90 tablet, Rfl: 1    multivitamin with minerals tablet tablet, Take 1 tablet by mouth Daily., Disp: , Rfl:     omeprazole (priLOSEC) 20 MG capsule, TAKE 1 CAPSULE BY MOUTH DAILY, Disp: 90 capsule, Rfl: 3    Probiotic Product (PROBIOTIC PO), Take 1 capsule by mouth Daily. PT TO HOLD MED PRIOR TO OR, Disp: , Rfl:     vitamin B-12 (CYANOCOBALAMIN) 1000 MCG tablet, Take 5 tablets by mouth Daily., Disp: , Rfl:     Objective     Vitals:    06/07/24 1304   BP: 137/79   Pulse: 68   SpO2: 99%   Weight: 126 kg (277 lb)   Height: 175.3 cm (69\")   PainSc:   2     Body mass index is 40.91 kg/m².    Physical Exam  Constitutional:       Appearance: Normal appearance. He is obese.      Comments: Severe Obesity (BMI >= 40) Pt Current BMI = 40.91    HENT:      Head: Normocephalic and atraumatic.      Right Ear: External ear normal.      Left Ear: External ear normal.      Nose: Nose normal.   Eyes:      Extraocular Movements: Extraocular movements intact.      Conjunctiva/sclera: Conjunctivae normal.   Pulmonary:      Effort: Pulmonary effort is normal.   Musculoskeletal:         General: Normal range of motion.      " Cervical back: Normal range of motion.   Skin:     General: Skin is warm and dry.   Neurological:      General: No focal deficit present.      Mental Status: He is alert and oriented to person, place, and time. Mental status is at baseline.   Psychiatric:         Mood and Affect: Mood normal.         Behavior: Behavior normal.         Thought Content: Thought content normal.         Judgment: Judgment normal.             Result Review :   The following data was reviewed by: RAY Marrero on 06/07/2024:    Most Recent A1C          6/7/2024    13:05   HGBA1C Most Recent   Hemoglobin A1C 6.3        A1C Last 3 Results          1/15/2024    14:48 4/17/2024    07:31 6/7/2024    13:05   HGBA1C Last 3 Results   Hemoglobin A1C 8.00  5.70  6.3      A1c collected in the office today is 6.3%, indicating Controlled Type II diabetes.  This result is up from the prior result of 5.7% collected on 4/17/24     Creatinine   Date Value Ref Range Status   04/17/2024 1.05 0.76 - 1.27 mg/dL Final   01/15/2024 1.28 (H) 0.76 - 1.27 mg/dL Final     eGFR   Date Value Ref Range Status   04/17/2024 82.8 >60.0 mL/min/1.73 Final   01/15/2024 65.3 >60.0 mL/min/1.73 Final     Labs collected on 4/17/2024 show Stage II mild (GFR = 60-89mL/min)    Total Cholesterol   Date Value Ref Range Status   04/17/2024 181 0 - 200 mg/dL Final   10/13/2023 246 (H) 0 - 200 mg/dL Final     Triglycerides   Date Value Ref Range Status   04/17/2024 424 (H) 0 - 150 mg/dL Final   10/13/2023 1,574 (H) 0 - 150 mg/dL Final     HDL Cholesterol   Date Value Ref Range Status   04/17/2024 21 (L) 40 - 60 mg/dL Final   10/13/2023 16 (L) 40 - 60 mg/dL Final     Comment:     Triglyceride result >1200 mg/dL. HDL result may be affected.  Calculated results will not be reported.      LDL Cholesterol    Date Value Ref Range Status   04/17/2024 90 0 - 100 mg/dL Final   10/13/2023   Final     Comment:     Unable to calculate   10/13/2023 55 0 - 100 mg/dL Final     Lipid panel  collected on 4/17/2024 shows Hypertriglyceridemia and low HDL            Assessment: The patient has had significant improvement in his A1c.  His A1c came down from 8% to 5.7% from January to April.  Patient reports that he implemented significant dietary changes which is most likely the reason that the A1c went down.  He has previously been tried on Trulicity and Ozempic and had poor GI tolerance.  His A1c is up a little bit today.  He is not following the same diet that he was following earlier this year.  The patient has a long COVID with chronic fecal urgency with diarrhea so he is not a candidate for metformin or GLP-1 therapy.  Since his A1c is so well-controlled through dietary behavior we talked about trying to manage his diabetes with diet alone before considering any medication management.  The patient is agreeable to see the dietitian for nutrition counseling.  The patient reports having a 28 pound intentional weight loss over the last 6 months or so.      Diagnoses and all orders for this visit:    1. Controlled type 2 diabetes mellitus with diabetic polyneuropathy, without long-term current use of insulin (Primary)  -     POC Glycosylated Hemoglobin (Hb A1C)  -     Ambulatory Referral to [facility] - [facility]    2. Type 2 diabetes mellitus with stage 2 chronic kidney disease, without long-term current use of insulin    3. Severe obesity (BMI >= 40)        Plan: We will avoid using any diabetes medications at this particular time.  The patient will be scheduled with the dietitian for nutrition counseling.  He will focus on dietary strategies as well as physical activity to help promote glucose control and weight loss.  Should the patient require medication management in the future we will consider something such as a sulfonylurea or DPP 4 as these would have a low risk of causing adverse side effects affecting his GI system.    The patient will monitor his blood glucose levels as needed.  If he  develops problematic hyperglycemia or hypoglycemia or adverse drug reactions, he will contact the office for further instructions.        Follow Up     Return in about 3 months (around 9/7/2024) for Medication Management, DMNT.    Patient was given instructions and counseling regarding his condition or for health maintenance advice. Please see specific information pulled into the AVS if appropriate.     RAY Marrero  06/07/2024      Dictated Utilizing Dragon Dictation.  Please note that portions of this note were completed with a voice recognition program.  Part of this note may be an electronic transcription/translation of spoken language to printed text using the Dragon Dictation System.

## 2024-06-11 DIAGNOSIS — G62.9 NEUROPATHY: ICD-10-CM

## 2024-06-12 RX ORDER — GABAPENTIN 100 MG/1
CAPSULE ORAL
Qty: 120 CAPSULE | Refills: 5 | Status: SHIPPED | OUTPATIENT
Start: 2024-06-12

## 2024-06-19 DIAGNOSIS — F31.75 BIPOLAR DISORDER, IN PARTIAL REMISSION, MOST RECENT EPISODE DEPRESSED: ICD-10-CM

## 2024-06-19 DIAGNOSIS — G89.29 OTHER CHRONIC PAIN: ICD-10-CM

## 2024-06-20 RX ORDER — DULOXETIN HYDROCHLORIDE 20 MG/1
CAPSULE, DELAYED RELEASE ORAL
Qty: 90 CAPSULE | Refills: 3 | OUTPATIENT
Start: 2024-06-20

## 2024-06-20 NOTE — TELEPHONE ENCOUNTER
DULoxetine HCl 20 MG Oral Capsule Delayed Release Particles   Last refill: 5/24/2024   Follow up 07/11/2024

## 2024-07-08 DIAGNOSIS — M17.11 PRIMARY OSTEOARTHRITIS OF RIGHT KNEE: ICD-10-CM

## 2024-07-11 ENCOUNTER — OFFICE VISIT (OUTPATIENT)
Dept: BEHAVIORAL HEALTH | Facility: CLINIC | Age: 59
End: 2024-07-11
Payer: COMMERCIAL

## 2024-07-11 VITALS
DIASTOLIC BLOOD PRESSURE: 80 MMHG | BODY MASS INDEX: 41.71 KG/M2 | WEIGHT: 281.6 LBS | HEIGHT: 69 IN | HEART RATE: 67 BPM | SYSTOLIC BLOOD PRESSURE: 142 MMHG

## 2024-07-11 DIAGNOSIS — F31.75 BIPOLAR DISORDER, IN PARTIAL REMISSION, MOST RECENT EPISODE DEPRESSED: Primary | ICD-10-CM

## 2024-07-11 DIAGNOSIS — G89.29 OTHER CHRONIC PAIN: ICD-10-CM

## 2024-07-11 DIAGNOSIS — R52 DISTURBANCE OF SLEEP PATTERN ASSOCIATED WITH PAIN: ICD-10-CM

## 2024-07-11 DIAGNOSIS — F41.1 GENERALIZED ANXIETY DISORDER: ICD-10-CM

## 2024-07-11 DIAGNOSIS — Z79.899 MEDICATION MANAGEMENT: ICD-10-CM

## 2024-07-11 DIAGNOSIS — G47.9 DISTURBANCE OF SLEEP PATTERN ASSOCIATED WITH PAIN: ICD-10-CM

## 2024-07-11 RX ORDER — DULOXETINE 40 MG/1
40 CAPSULE, DELAYED RELEASE ORAL EVERY MORNING
Qty: 60 CAPSULE | Refills: 0 | Status: SHIPPED | OUTPATIENT
Start: 2024-07-11 | End: 2024-09-09

## 2024-07-11 RX ORDER — CELECOXIB 200 MG/1
200 CAPSULE ORAL DAILY
Qty: 30 CAPSULE | Refills: 0 | Status: SHIPPED | OUTPATIENT
Start: 2024-07-11

## 2024-07-11 NOTE — PATIENT INSTRUCTIONS
"1. Should you want to get in touch with your provider, RAY Yates, please contact MY Medical Assistant, Shawna, directly at [phone].  Recommend saving Shawna's direct number in phone as this is the PREFERRED & EASIEST way to get in contact with your provider.  Please leave a voice mail if you do not get an answer and she will return your call within 24 hrs. You will NOT be able to contact provider on [employer], as Behavioral Health Providers are restricted. YOU MUST CALL [phone]  If you need to speak with the on call provider after hours or on weekends, please Contact the [facility] ([phone]) and staff will be able to page the provider on call directly.     2.  In the event you need to cancel an appointment, please notify the office at least 24 hrs prior:   Contact **Shawna Medical Assistant at [facility] directly at [phone] or the [facility] ([phone])     3. MEDICATION REFILLS:  PLEASE CALL THE PHARMACY TO REQUEST ALL MEDICATION REFILLS or via [employer] TO ENSURE YOU ARE RECEIVING YOUR MEDICATIONS IN A TIMELY MANNER. The pharmacy or [employer] [facility] will send this request ELECTRONICALLY to the ordering provider.   IF YOU USE AN AUTOMATED SERVICE AT THE PHARMACY FOR REFILLS AND ARE TOLD THERE ARE \"NO REFILLS REMAINING\"   PLEASE CALL THE PHARMACY & SPEAK TO A LIVE PERSON TO VERIFY IT IS THE MOST UP TO DATE PRESCRIPTION ON FILE.    All new prescriptions will have a different number, therefore, if you were given refills for a medication today or at last visit it will not have the same number as the previous prescription.     4.  In the event you have personal crisis, contact the following crisis numbers: Suicide Prevention Hotline 1-417.889.3629 or *988, [facility] Helpline 7-571-140-BJRL; [facility] Emergency Room [phone]; text HELLO to 796567; or 911.  If you feel like harming yourself or others, call 911 right away.  You can call the 983 Suicide and Crisis " "Lifeline at  988   to speak with a counselor at the [facility], or you can connect with one using their online chat  .    5.  Never stop an antidepressant medicine without first talking to a healthcare provider. Suddenly stopping this type of medication can cause withdrawal symptoms.    6.  Counseling and talk therapy  Counseling or therapy teaches you new coping skills and more adaptive ways of thinking about problems. These tools can help you make positive changes. The benefits of counseling often last long after treatment sessions have stopped.    7.   We would appreciate your feedback, please scan the QRS code on the back of your appointment card (or see below) and complete a brief survey.  [place] location is still not available, so please click \"[place]\" location.  Thank you      SPECIFIC RECOMMENDATIONS:     1.      Medications discussed at this encounter:                   -  INCREASE Cymbalta FROM 20 mg TO 40 mg daily in the morning     2.      Psychotherapy recommendations:  Declined     3.     Return to clinic: 6 weeks Friday 8/23/24 at 8 am     Please arrive at least 15 minutes before your scheduled appointment time to complete check in process.      IF you are scheduled for a [employer] VIDEO visit, YOU MUST COMPLETE THE \"E-CHECK IN\" PROCESS PRIOR TO BEGINNING THE VISIT, YOU WILL NO LONGER RECEIVE A PHONE CALL PRIOR TO ALL VIDEO VISITS; You may still complete the E-Check in for in office visits prior to appointment, you will receive multiple text/email reminders which will direct you further if needed.           "

## 2024-07-11 NOTE — PROGRESS NOTES
"  Subjective   Anthony Brown is a 58 y.o. male who presents today for follow up    Referring Provider:  No referring provider defined for this encounter.    Chief Complaint:  bipolar disorder-depression, anxiety, chronic pain, disturbance of sleep due to chronic pain, medication management     Answers submitted by the patient for this visit:  Primary Reason for Visit (Submitted on 7/7/2024)  What is the primary reason for your visit?: Depression  Depression (Submitted on 7/7/2024)  Chief Complaint: Depression  Visit: follow-up  Frequency: most days  Severity: moderate  excessive worry: No  insomnia: Yes  irritability: Yes  malaise/fatigue: Yes  obsessions: No  hypersomnia: Yes  difficulty controlling mood: Yes  difficulty staying asleep: No  difficulty falling asleep: No  Hours of sleep per night: 9 Hours  Medication compliance: %  Side effects: fatigue  Aggravated by: family issues    History of Present Illness:    7/11/24: Patient presents today in office reported 2 weeks ago had accidentally taken all routine morning medications twice, as patient got mixed up on the day he had removed the pills from medi-planner, wife had mentioned patient had done better that day. \"I will admit it was a good day.\" Denies any adverse reactions.     Patient did finally have disability hearing, which seemed positive from patient perspective, will learn of decision in 2 months.  Which has been stressful.      Patient has been taking more naps lately, due to chronic pain-right shoulder, sleep has been disturbed. Which has been ongoing since long Covid.     Depression: Patient complains of depression. He complains of anhedonia, depressed mood, difficulty concentrating, and fatigue  Anxiety:  The patient endorses to symptoms of anxiety including:  anxiousness, being easily fatigued, difficulty concentrating or mind going blank, and irritability which have caused impairment in important areas of daily functioning.  Denies: " inflated self-esteem or grandiosity, decreased need for sleep, increased talkativeness, flight of ideas or racing thoughts, distractibility, increase in goal-directed activity or psychomotor agitation, and increase in risky behavior      7/11/23:    Answers submitted by the patient for this visit:  Primary Reason for Visit (Submitted on 7/6/2023)  What is the primary reason for your visit?: Other  Other (Submitted on 7/6/2023)  Please describe your symptoms.: None  Have you had these symptoms before?: Yes  How long have you been having these symptoms?: Greater than 2 weeks    Patient presents today in office for Lamictal refills.  Patient had requested refills previously which were denied due to patient not following up with this provider, as last visit was 12/1/22.  Due to patient not having Lamictal for 4 days (7/10-7/13/23) there was an increased risk of developing a rash, therefore, patient was prescribed 100 mg on 7/7/23 which patient started that morning.   Patient denies having a rash. Tolerating well. Patient was able to tell the medication was not on board, as temper was easily triggered, felt easily irritated. Patient does not recall ever missing more than 2 doses in the past 13 yrs.   Patient reports there were several insurance issues several months ago due to wife's employer, and switch with [facility] coverage.  Patient is now using [facility] [place] pharmacy with [place], and pharmacy may have to be changed due to insurance requirement of 90 day supply.      Patient continues to await Social Security, Disability- has been told it may be up to 1 yr as first application which took 6 months was denied approximately 1 month ago, and now  has paperwork.     Patient continues to have diarrhea despite colestipol, long COVID symptoms of joint pain and muscle aches.      Patient feels mood is doing good, and stable with Lamictal and Cymbalta.    Depression: Patient complains of depression. He complains of anhedonia,  "depressed mood, difficulty concentrating, fatigue, feelings of worthlessness/guilt, and insomnia  Anxiety:  The patient endorses significant symptoms of anxiety including:  anxiousness, being easily fatigued, difficulty concentrating or mind going blank, irritability, and sleep disturbance which have caused impairment in important areas of daily functioning.     Denies: inflated self-esteem or grandiosity, decreased need for sleep, increased talkativeness, flight of ideas or racing thoughts, distractibility, increase in goal-directed activity or psychomotor agitation, and increase in risky behavior      12/1/22:    Answers for HPI/ROS submitted by the patient on 11/24/2022  What is the primary reason for your visit?: Other  Please describe your symptoms.: None  Have you had these symptoms before?: Yes  How long have you been having these symptoms?: Greater than 2 weeks  Please list any medications you are currently taking for this condition.: Lamictal, Cymbalta  Patient presents today in office, \"I am doing good.\"  Patient reports overall mental health has improved.  Patient reports having a new diagnosis of \"long COVID\", which has relieved some anxiety about physical health decline.  As patient suffers from chronic joint pain and COVID symptoms exacerbated the inflammation.  Patient expresses no longer feeling alone in relation to long term COVID symptoms. Diarrhea has been ongoing which was told it will subside. Muscle joint pain for 9 months. Prior only had in hands, now in hands,feet,shoulders.  Patient had quit working in June 2022, originally had COVID end of Jan. 2022, however, patient is now no longer employed and has applied for Disability, Social Security.  Financial issues have been a stressor, which is starting to ease.  Wife is working via [employer] as a contract employee, though does not have health insurance.  Patient is now paying for insurance out of pocket instead of employer, [employer].     Patient has " "started working with physical therapy at [facility] to prepare for knee replacement in the future.  Patient is considering starting therapy, and wishes to discuss further at next visit.     Denies mood fluctuations with bipolar.  Chronic pain continues to flare at times with increased activity, which does bring mood down, though able to recover and not allowing depression to linger throughout the day.         9/20/22:  Patient presents today in office, \"well my wife says I am better. I can see the mood is up a little bit, a couple days I felt manic, but it could be that I was normal and not used to it.\"    Patient feels Cymbalta has been helpful with fibromyalgia symptoms.    Patient had to put his dog down yesterday, which patient has been feeling down.      Reports 3-4 days of having more energy and inability to sit still, 2 in a row and a couple of randoms \"I feel like I had a few days of being testy than others, not angry, my previous counselor had taught me how to recognize anger feelings and able to calm down.\"  \"the day after I was exhausted because pain would kick in because I did too much physically.\"   \"Not severe as wife did not notice, I just felt like I needed to be more careful that day\" in order to avoid increased irritability or anger.  Patient reported prior behavior was up and down and now mood is more \"normalized\".    Other than mentioned symptoms, patient feels mood is stable, denies feelings of depression or anxiety.     Reports symptoms of: increase in goal-directed activity or psychomotor agitation  Denies: inflated self-esteem or grandiosity, decreased need for sleep, increased talkativeness, flight of ideas or racing thoughts, distractibility and increase in risky behavior      8/16/22:  INITIAL EVAL  Patient presents today in office with a history of bipolar depression, which patient began treatment initially in 2008 for depression, which later diagnosed with bipolar.  Patient has been taking " "Lamictal 200 mg every morning for approximately 12 yr.  Reports starting Cymbalta approximately 2 weeks for fibromyalgia by PCP.    Patient reports having a history of \"kaleigh\" related to medications as patient was being treated for depression.  After 1.5 to 2 yrs ex-wife had contacted psychiatrist before visit and changed diagnosis to Bipolar Depression, as patient was having mood fluctuations daily, higher during the morning and lower at night.  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 \"my bipolar hits normal and it was pushing me a little up, and I still have ups and downs 2-3 times per month, having a little dip, more consistent and closer to normal now.\"  Down days only last for one day.     Little dip described as lack of motivation, easy to shut down and decrease activity for that day, less talkative, gets quiet, \"I just have to crash on the couch usually. I am up and down, I am aware of my surroundings, just not as good as the day before.\"  \"I haven't had a sit in the dark day for 12 yrs, medication has done well since day 1.\"     Patient recalls prior psychiatrist had thought patient had only low days rarely, though patient was having more down low days than better days.     Patient reports weekend of 8/4/22 Thurs, Fri, and Sat wife had mentioned patient was more active than normal, pain was less, sleeping 5 hrs/night and normally 7-8 hrs/night, and patient thought the Cymbalta was working, and Sunday and Monday on the lower side of normal, patient uncertain if mood was good or hyper, \"I was up running around, I slept well just not very long.\"  On Sunday and Monday patient noted increased pain which effected mood, as patient recalls feeling depressed, \"the pain came back and I didn't have that motivation.\"  Patient reports on days mood is better as pain is not as active, though patient reports he is not " surprised by the cycle of feeling good for few days then feeling down as pain varies throughout body.     Patient denies episodes or symptoms of true hypomania.     Patient voices desire to return to work, as patient is on short term disability for chronic pain and possible fibromyalgia.  Patient had missed 8-10 days of work in May and only worked 6 days in June.  Patient reports pain is worse upon awakening which eases off as day progresses, however, with activity pain worsens later in the day.  Patient also having trouble with diarrhea for the last several months, since Feb., and recalls having trouble walking to bathroom at employer due to distance.         PHQ-9 Depression Screening  PHQ-9 Total Score: (P) 9 (PHQ2-2)    Little interest or pleasure in doing things? (P) 1-->several days   Feeling down, depressed, or hopeless? (P) 1-->several days   Trouble falling or staying asleep, or sleeping too much? (P) 2-->more than half the days   Feeling tired or having little energy? (P) 2-->more than half the days   Poor appetite or overeating? (P) 1-->several days   Feeling bad about yourself - or that you are a failure or have let yourself or your family down? (P) 1-->several days   Trouble concentrating on things, such as reading the newspaper or watching television? (P) 1-->several days   Moving or speaking so slowly that other people could have noticed? Or the opposite - being so fidgety or restless that you have been moving around a lot more than usual? (P) 0-->not at all   Thoughts that you would be better off dead, or of hurting yourself in some way? (P) 0-->not at all   PHQ-9 Total Score (P) 9     NAM-7  Feeling nervous, anxious or on edge: (P) Several days  Not being able to stop or control worrying: (P) Several days  Worrying too much about different things: (P) Several days  Trouble Relaxing: (P) Not at all  Being so restless that it is hard to sit still: (P) Not at all  Feeling afraid as if something awful  might happen: (P) Not at all  Becoming easily annoyed or irritable: (P) Several days  NAM 7 Total Score: (P) 4  If you checked any problems, how difficult have these problems made it for you to do your work, take care of things at home, or get along with other people: (P) Somewhat difficult     Past Surgical History:  Past Surgical History:   Procedure Laterality Date    COLONOSCOPY  2019?    HERNIA REPAIR      Inguinal    JOINT REPLACEMENT  06/2017    Right knee    KNEE SURGERY Right 1985    [place] ARTHRP KNE CONDYLE&PLATU MEDIAL&LAT COMPARTMENTS N/A 06/26/2017    Procedure: RIGHT TOTAL KNEE ARTHROPLASTY, LEFT KNEE INJECTED WITH CORTISONE;  Surgeon: Bradford Torres MD;  Location: [facility];  Service: Orthopedics    TONSILLECTOMY  1970    VASECTOMY  1998       Problem List:  Patient Active Problem List   Diagnosis    Primary osteoarthritis of right knee    Other chronic pain    DDD (degenerative disc disease), lumbar    Lumbar radiculopathy    Testosterone deficiency    Abreu's esophagus with dysplasia, unspecified    Bipolar disorder    Depression    High cholesterol    Hypertension    Low back pain    Sleep apnea with use of continuous positive airway pressure (CPAP)    Testicular hypofunction    Class 3 severe obesity due to excess calories without serious comorbidity with body mass index (BMI) of 40.0 to 44.9 in adult    Diarrhea    Numbness in feet    Long term (current) use of non-steroidal anti-inflammatories (nsaid)    Neuropathy    Type 2 diabetes mellitus with diabetic neuropathy, without long-term current use of insulin       Allergy:   No Known Allergies     Discontinued Medications:  Medications Discontinued During This Encounter   Medication Reason    DULoxetine (Cymbalta) 20 MG capsule Dose adjustment           Current Medications:   Current Outpatient Medications   Medication Sig Dispense Refill    atorvastatin (LIPITOR) 10 MG tablet Take 1 tablet by mouth Daily. 90 tablet 1    celecoxib (CeleBREX)  200 MG capsule TAKE 1 CAPSULE BY MOUTH DAILY 90 capsule 0    Cholecalciferol (VITAMIN D3) 5000 UNITS capsule capsule Take 2,000 Units by mouth Daily.      colestipol (COLESTID) 1 g tablet TAKE 1 TABLET BY MOUTH TWICE  DAILY WITH MEALS 180 tablet 3    dicyclomine (BENTYL) 10 MG capsule TAKE 1 CAPSULE BY MOUTH 4 TIMES  DAILY BEFORE MEALS AND AT  BEDTIME 120 capsule 0    DULoxetine HCl 40 MG capsule delayed-release particles Take 1 capsule by mouth Every Morning 60 capsule 0    fenofibrate 160 MG tablet TAKE 1 TABLET BY MOUTH DAILY 90 tablet 3    fluticasone (FLONASE) 50 MCG/ACT nasal spray Use 2 sprays into the nostril(s) as directed by provider Daily. 16 g 3    gabapentin (NEURONTIN) 100 MG capsule TAKE 1 CAPSULE BY MOUTH EVERY  MORNING AND 3 CAPSULES BY MOUTH  AT NIGHT 120 capsule 5    lamoTRIgine (LaMICtal) 200 MG tablet Take 1 tablet by mouth Every Morning. 90 tablet 3    lisinopril (PRINIVIL,ZESTRIL) 20 MG tablet Take 1 tablet by mouth Daily. 90 tablet 1    multivitamin with minerals tablet tablet Take 1 tablet by mouth Daily.      omeprazole (priLOSEC) 20 MG capsule TAKE 1 CAPSULE BY MOUTH DAILY 90 capsule 3    Probiotic Product (PROBIOTIC PO) Take 1 capsule by mouth Daily. PT TO HOLD MED PRIOR TO OR      vitamin B-12 (CYANOCOBALAMIN) 1000 MCG tablet Take 5 tablets by mouth Daily.       No current facility-administered medications for this visit.       Past Medical History:  Past Medical History:   Diagnosis Date    Acute URI     Arthritis     bialteral knees    Abreu's esophagus with dysplasia, unspecified     Bipolar disorder     Callus Always    Chronic pain disorder     Colon polyp 2019?    Contact with and (suspected) exposure to other viral communicable diseases     Depression     Deviated nasal septum     Difficulty walking Feb 2022    Random pain    GERD (gastroesophageal reflux disease) 2008    High cholesterol     History of chest pain     6 YEARS AGO, HAD STRESS TEST NORMAL    Hypertension      "Ingrown toenail Childhood    Low back pain     Low testosterone level in male     HAS TESTOSTERONE PELLETS IMPLANTED    Numbness in feet     Obesity     Pain in joint     UNSPECIFIED    Plantar fasciitis     No other occurrence    Shin splints Random over years    During or after exercise    Sinusitis     Sleep apnea with use of continuous positive airway pressure (CPAP)     Sleep apnea, unspecified     SOB (shortness of breath)     Testicular hypofunction     Testosterone deficiency        Past Psychiatric History:  Began Treatment:   Diagnoses:Depression and Bipolar Depression  Psychiatrist: 2 visits around   Therapist: last seen approximately 8 yr ago-helpful at the time  Admission History:Denies  Medication Trials: Uncertain, \"tried 3-4 meds for a few months that didn't have much effect.\" prior to   Self Harm: Denies  Suicide Attempts:Denies      Substance Abuse History:   Types:Denies all, including illicit  Withdrawal Symptoms:Denies  Longest Period Sober:Not Applicable   AA: Not applicable     Social History:  Martial Status:  Employed:No awaiting Social Security, Disability hearing outcome, which can take up to 2 months updated 24  Kids:Yes or If so, how many 2  House:Lives in a house   History: Denies  Access to Guns:  Yes, \"most are put away\"    Social History     Socioeconomic History    Marital status:     Number of children: 2   Tobacco Use    Smoking status: Former     Current packs/day: 0.00     Average packs/day: 0.3 packs/day for 3.7 years (0.9 ttl pk-yrs)     Types: Cigarettes, Pipe, Cigars     Start date: 1981     Quit date: 1984     Years since quittin.8    Smokeless tobacco: Never    Tobacco comments:     smoked as a teenager   Vaping Use    Vaping status: Never Used   Substance and Sexual Activity    Alcohol use: Yes     Alcohol/week: 3.0 standard drinks of alcohol     Types: 3 Cans of beer per week     Comment: socially    Drug use: Never "     Types: Marijuana     Comment: in college    Sexual activity: Yes     Partners: Female     Birth control/protection: Post-menopausal, Surgical       Family History:   Suicide Attempts: Denies  Suicide Completions:Denies      Family History   Problem Relation Age of Onset    Diabetes Mother     Hypertension Mother     Neuropathy Mother     Depression Father     Colon polyps Father     Stroke Father     Skin cancer Father         unknown type    Depression Sister     Hypertension Sister     Rheum arthritis Sister     Arthritis Sister     ADD / ADHD Nephew     Malig Hyperthermia Neg Hx        Developmental History:   Born: [place]  Siblings:1 sister  Childhood: Denies Abuse  High School:Completed  College: some    Mental Status Exam:   Hygiene:   good  Cooperation:  Cooperative  Eye Contact:  Good  Psychomotor Behavior:  Appropriate  Affect:  Appropriate  Mood: depressed mild   Speech:  Normal  Thought Process:  Goal directed  Thought Content:  Mood congruent  Suicidal:  None  Homicidal:  None  Hallucinations:  None  Delusion:  None  Memory:  Intact  Orientation:  Person, Place, Time and Situation  Reliability:  good  Insight:  Good  Judgement:  Good  Impulse Control:  Good  Physical/Medical Issues:  Yes HTN,HLD,Obesity,Barretts esophaagus,LBP,OA,Lumbar DDD,JEANNIE-CPAP,Chronic leg pain, paresthesia dorothy feet, testoterone def.  DM new 10/2023, long COVID with chronic fecal urgency w/diarrhea    Review of Systems:  Review of Systems   Constitutional:  Negative for diaphoresis, fatigue and fever.   HENT:  Negative for drooling.    Eyes:  Negative for visual disturbance.   Respiratory:  Positive for apnea. Negative for cough, chest tightness and shortness of breath.    Cardiovascular:  Negative for chest pain, palpitations and leg swelling.   Gastrointestinal:  Positive for diarrhea. Negative for abdominal pain, nausea and vomiting.        Chronic fecal urgency with diarrhea    Endocrine: Negative for cold  "intolerance and heat intolerance.   Genitourinary:  Negative for difficulty urinating.   Musculoskeletal:  Positive for joint swelling and myalgias. Negative for back pain and neck pain.        Right shoulder chronic pain   Allergic/Immunologic: Negative for immunocompromised state.   Neurological:  Negative for dizziness, seizures, syncope, speech difficulty, weakness, light-headedness, numbness and headaches.   Psychiatric/Behavioral:  Positive for decreased concentration and sleep disturbance. Negative for confusion, hallucinations, self-injury and suicidal ideas. The patient is nervous/anxious. The patient is not hyperactive.          Physical Exam:  Physical Exam  Psychiatric:         Attention and Perception: Attention and perception normal.         Mood and Affect: Mood and affect normal.         Speech: Speech normal.         Behavior: Behavior normal. Behavior is cooperative.         Thought Content: Thought content normal. Thought content does not include suicidal ideation. Thought content does not include suicidal plan.         Cognition and Memory: Cognition and memory normal.         Judgment: Judgment normal.         Vital Signs:   /80   Pulse 67   Ht 175.3 cm (69\")   Wt 128 kg (281 lb 9.6 oz)   BMI 41.59 kg/m²      Office notes from care team reviewed:  Date of Service: 6/7/24 OV with RAY Zelaya with [facility]   Date of Service: 5/28/24 OV with  with [facility]General [facility]   Date of Service: 4/19/24 OV with RAY Chavez with [facility]FM     Lab Results: REVIEWED  Office Visit on 06/07/2024   Component Date Value Ref Range Status    Hemoglobin A1C 06/07/2024 6.3 (A)  4.5 - 5.7 % Final    Lot Number 06/07/2024 #[id number]   Final    Expiration Date 06/07/2024 02.12.26   Final   Office Visit on 04/19/2024   Component Date Value Ref Range Status    Amphetamine Screen, Urine 04/19/2024 Negative  Negative Final    Barbiturates Screen, Urine 04/19/2024 Negative  Negative Final    " Buprenorphine, Screen, Urine 04/19/2024 Negative  Negative Final    Benzodiazepine Screen, Urine 04/19/2024 Negative  Negative Final    Cocaine Screen, Urine 04/19/2024 Negative  Negative Final    MDMA (ECSTASY) 04/19/2024 Negative  Negative Final    Methamphetamine, Ur 04/19/2024 Negative  Negative Final    Morphine/Opiates Screen, Urine 04/19/2024 Negative  Negative Final    Methadone Screen, Urine 04/19/2024 Negative  Negative Final    Oxycodone Screen, Urine 04/19/2024 Negative  Negative Final    Phencyclidine (PCP), Urine 04/19/2024 Negative  Negative Final    THC, Screen, Urine 04/19/2024 Negative  Negative Final    Lot Number 04/19/2024 [id number]   Final    Expiration Date 04/19/2024 11/12/25   Final   Lab on 04/17/2024   Component Date Value Ref Range Status    Total Cholesterol 04/17/2024 181  0 - 200 mg/dL Final    Triglycerides 04/17/2024 424 (H)  0 - 150 mg/dL Final    HDL Cholesterol 04/17/2024 21 (L)  40 - 60 mg/dL Final    LDL Cholesterol  04/17/2024 90  0 - 100 mg/dL Final    VLDL Cholesterol 04/17/2024 70 (H)  5 - 40 mg/dL Final    LDL/HDL Ratio 04/17/2024 3.58   Final    Hemoglobin A1C 04/17/2024 5.70 (H)  4.80 - 5.60 % Final    Glucose 04/17/2024 107 (H)  65 - 99 mg/dL Final    BUN 04/17/2024 15  6 - 20 mg/dL Final    Creatinine 04/17/2024 1.05  0.76 - 1.27 mg/dL Final    Sodium 04/17/2024 141  136 - 145 mmol/L Final    Potassium 04/17/2024 3.6  3.5 - 5.2 mmol/L Final    Chloride 04/17/2024 103  98 - 107 mmol/L Final    CO2 04/17/2024 27.3  22.0 - 29.0 mmol/L Final    Calcium 04/17/2024 9.7  8.6 - 10.5 mg/dL Final    Total Protein 04/17/2024 6.9  6.0 - 8.5 g/dL Final    Albumin 04/17/2024 4.2  3.5 - 5.2 g/dL Final    ALT (SGPT) 04/17/2024 45 (H)  1 - 41 U/L Final    AST (SGOT) 04/17/2024 34  1 - 40 U/L Final    Alkaline Phosphatase 04/17/2024 101  39 - 117 U/L Final    Total Bilirubin 04/17/2024 0.3  0.0 - 1.2 mg/dL Final    Globulin 04/17/2024 2.7  gm/dL Final    A/G Ratio 04/17/2024 1.6  g/dL  Final    BUN/Creatinine Ratio 04/17/2024 14.3  7.0 - 25.0 Final    Anion Gap 04/17/2024 10.7  5.0 - 15.0 mmol/L Final    eGFR 04/17/2024 82.8  >60.0 mL/min/1.73 Final   Lab on 01/15/2024   Component Date Value Ref Range Status    Hemoglobin A1C 01/15/2024 8.00 (H)  4.80 - 5.60 % Final    Glucose 01/15/2024 281 (H)  65 - 99 mg/dL Final    BUN 01/15/2024 13  6 - 20 mg/dL Final    Creatinine 01/15/2024 1.28 (H)  0.76 - 1.27 mg/dL Final    Sodium 01/15/2024 136  136 - 145 mmol/L Final    Potassium 01/15/2024 4.1  3.5 - 5.2 mmol/L Final    Chloride 01/15/2024 99  98 - 107 mmol/L Final    CO2 01/15/2024 25.0  22.0 - 29.0 mmol/L Final    Calcium 01/15/2024 10.1  8.6 - 10.5 mg/dL Final    Total Protein 01/15/2024 7.2  6.0 - 8.5 g/dL Final    Albumin 01/15/2024 4.7  3.5 - 5.2 g/dL Final    ALT (SGPT) 01/15/2024 64 (H)  1 - 41 U/L Final    AST (SGOT) 01/15/2024 58 (H)  1 - 40 U/L Final    Alkaline Phosphatase 01/15/2024 95  39 - 117 U/L Final    Total Bilirubin 01/15/2024 0.4  0.0 - 1.2 mg/dL Final    Globulin 01/15/2024 2.5  gm/dL Final    A/G Ratio 01/15/2024 1.9  g/dL Final    BUN/Creatinine Ratio 01/15/2024 10.2  7.0 - 25.0 Final    Anion Gap 01/15/2024 12.0  5.0 - 15.0 mmol/L Final    eGFR 01/15/2024 65.3  >60.0 mL/min/1.73 Final    Lipase 01/15/2024 47  13 - 60 U/L Final   Office Visit on 01/15/2024   Component Date Value Ref Range Status    Amphetamine Screen, Urine 01/15/2024 Negative  Negative Final    Barbiturates Screen, Urine 01/15/2024 Negative  Negative Final    Buprenorphine, Screen, Urine 01/15/2024 Negative  Negative Final    Benzodiazepine Screen, Urine 01/15/2024 Negative  Negative Final    Cocaine Screen, Urine 01/15/2024 Negative  Negative Final    MDMA (ECSTASY) 01/15/2024 Negative  Negative Final    Methamphetamine, Ur 01/15/2024 Negative  Negative Final    Morphine/Opiates Screen, Urine 01/15/2024 Negative  Negative Final    Methadone Screen, Urine 01/15/2024 Negative  Negative Final    Oxycodone  Screen, Urine 01/15/2024 Negative  Negative Final    Phencyclidine (PCP), Urine 01/15/2024 Negative  Negative Final    THC, Screen, Urine 01/15/2024 Negative  Negative Final    Lot Number 01/15/2024 [id number]   Final    Expiration Date 01/15/2024 9/11/2025   Final       EKG Results:  No orders to display       Imaging Results:  No Images in the past 120 days found..      Assessment & Plan   Diagnoses and all orders for this visit:    1. Bipolar disorder, in partial remission, most recent episode depressed (Primary)  -     DULoxetine HCl 40 MG capsule delayed-release particles; Take 1 capsule by mouth Every Morning  Dispense: 60 capsule; Refill: 0    2. Generalized anxiety disorder  -     DULoxetine HCl 40 MG capsule delayed-release particles; Take 1 capsule by mouth Every Morning  Dispense: 60 capsule; Refill: 0    3. Disturbance of sleep pattern associated with pain    4. Other chronic pain  -     DULoxetine HCl 40 MG capsule delayed-release particles; Take 1 capsule by mouth Every Morning  Dispense: 60 capsule; Refill: 0    5. Medication management            Visit Diagnoses:    ICD-10-CM ICD-9-CM   1. Bipolar disorder, in partial remission, most recent episode depressed  F31.75 296.55   2. Generalized anxiety disorder  F41.1 300.02   3. Disturbance of sleep pattern associated with pain  G47.9 780.50    R52 780.96   4. Other chronic pain  G89.29 338.29   5. Medication management  Z79.899 V58.69           PLAN:  Safety: No acute safety concerns  Therapy: Declines   Risk Assessment: Risk of self-harm acutely is low.  Risk factors include mood disorder, access to guns/weapons, and recent psychosocial stressors (pandemic). Protective factors include no family history, no present SI, no history of suicide attempts or self-harm in the past, minimal AODA, healthcare seeking, future orientation, willingness to engage in care.  Risk of self-harm chronically is also low, but could be further elevated in the event of  treatment noncompliance and/or AODA.  Meds:   Continue Lamictal 200 mg by mouth daily in the morning to target mood.  Discussed all risks, benefits, alternatives, and side effects of Lamictal, including but not limited to rash, rebound depressive or manic symptoms if prompt discontinuation, GI upset, agitation, and sedation. Patient instructed to avoid driving and doing other tasks or actions that require to be alert until knowing how the drug affects them. Discussed the need for patient to immediately call the office for any new or worsening symptoms, such as rash, worsening depression, feeling nervous or restless; suicidal thoughts or actions, or other changes changes in mood or behavior, and all other concerns. Patient educated on medication compliance and the risks of suddenly stopping this medication or missing doses. Patient verbalized understanding and is agreeable to taking Lamictal. Addressed all questions and concerns. NR needed, plan on sending refills at next visit.     INCREASE Cymbalta FROM 20 mg TO 40 mg by mouth daily in the morning to target mood and chronic pain. Discussed all risks, benefits, alternatives, and side effects of Duloxetine including but not limited to GI upset, sexual dysfunction, bleeding risk, seizure risk, weight loss, insomnia, diaphoresis, drowsiness, headache, dizziness, fatigue, activation of kaleigh or hypomania, increased fragility fracture risk, hyponatremia, increased BP, hepatotoxicity, ocular effects, withdrawal syndrome following abrupt discontinuation, serotonin syndrome, and activation of suicidal ideation and behavior. Patient educated on the need to practice safe sex while taking this medication. Discussed the need for patient to immediately call the office for any new or worsening symptoms, such as worsening depression; feeling nervous or restless; suicidal thoughts or actions; or other changes in mood or behavior, and all other concerns. Patient educated on  medication compliance and the risks of suddenly stopping this medication or missing doses. Patient verbalized understanding and is agreeable to taking Duloxetine. Addressed all questions and concerns. Refilled today for 60 days to [facility] pharmacy and will plan on refilling next time to mail order pharmacy if dose is effective and tolerated.  Patient instructed to inform provider if insurance will not cover the 40 mg caps.   5.   Labs: n/a     Symptoms of anxiety, bipolar depression, are under fair to good control with current medication regimen. Due to chronic shoulder pain and generalized pain sleep has been impacted, though patient also has other comorbid health conditions.    The plan was discussed with the patient. The patient was given time to ask questions and these questions were answered. At the conclusion of their visit they had no additional questions or concerns and all questions were answered to their satisfaction.   Patient was given instructions and counseling regarding condition and for health maintenance advice. Please see specific information pulled into the AVS if appropriate.    Patient to contact provider if symptoms worsen or fail to improve.        7/11/23:  Increase Lamictal FROM 100 mg back  mg by mouth daily in the morning to target mood.  Discussed all risks, benefits, alternatives, and side effects of Lamictal, including but not limited to rash, rebound depressive or manic symptoms if prompt discontinuation, GI upset, agitation, and sedation. Patient instructed to avoid driving and doing other tasks or actions that require to be alert until knowing how the drug affects them. Discussed the need for patient to immediately call the office for any new or worsening symptoms, such as rash, worsening depression, feeling nervous or restless; suicidal thoughts or actions, or other changes changes in mood or behavior, and all other concerns. Patient educated on medication compliance and the  risks of suddenly stopping this medication or missing doses. Patient verbalized understanding and is agreeable to taking Lamictal. Addressed all questions and concerns. Refilled today for 90 days with 3 refills.    Continue Cymbalta 20 mg by mouth daily in the morning to target mood and chronic pain.  Patient instructed to request refill when needed.      Symptoms of anxiety, bipolar depression, are under good control with current medication regimen.  Patient to follow up in 1 yr or sooner if needed.   Patient was given instructions and counseling regarding condition and for health maintenance advice. Please see specific information pulled into the AVS if appropriate.    Patient to contact provider if symptoms worsen or fail to improve.      7/7/23:   Lamictal 100 mg x5 days ordered and sent to [facility], as patient reported last dose 7/2/23, due to potential rash if missed 5 days or more, will plan on returning to 200 mg dose after visit scheduled for 7/11/23, as patient has not been seen since 12/1/22.     12/1/22:   -Therapy: Declines considering and wishes to discuss further at next visit after insurance determined.   -Continue Lamictal 200 mg by mouth daily in the morning to target mood.  -Continue Cymbalta 20 mg by mouth daily in the morning to target mood and chronic pain. Tolerating well, noted improvement with mood.  Refilled today for 90 days with 1 refill.     -Symptoms of bipolar depression, are under good control with current medication regimen.  Anxiety has decreased with new knowledge received per ID providers.  Patient is awaiting for response from social security and disability.    Patient to contact provider if symptoms worsen or fail to improve.     10/6/22:   Continue Lamictal 200 mg by mouth daily in the morning to target mood.    Continue Cymbalta 20 mg by mouth daily in the morning to target mood and chronic pain. Tolerating well, noted improvement with mood.     Symptoms  described today of increased energy suspect related to Cymbalta effectiveness as patient able to complete more physical tasks and other than having a few days of increased energy patient denied other negative symptoms. Will continue same dose of Cymbalta for now and reassess at next appointment.   Patient to contact provider if symptoms worsen or fail to improve.     8/16/22:   -Declines therapy   Continue Lamictal 200 mg by mouth daily in the morning to target mood. Risks, benefits, alternatives discussed with patient including rash, rebound depressive or manic symptoms if prompt discontinuation, GI upset, agitation, sedation/falls risk.  After discussion of these risks and benefits, patient voiced understanding and agreed to proceed. Refilled today  Continue Cymbalta 20 mg by mouth daily in the morning to target mood and chronic pain as previously started by PCP on 8/3/22.  Discussed all risks, benefits, alternatives, and side effects of Duloxetine including but not limited to GI upset, sexual dysfunction, bleeding risk, seizure risk, weight loss, insomnia, diaphoresis, drowsiness, headache, dizziness, fatigue, activation of kaleigh or hypomania, increased fragility fracture risk, hyponatremia, increased BP, hepatotoxicity, ocular effects, withdrawal syndrome following abrupt discontinuation, serotonin syndrome, and activation of suicidal ideation and behavior. Patient educated on the need to practice safe sex while taking this medication. Discussed the need for patient to immediately call the office for any new or worsening symptoms, such as worsening depression; feeling nervous or restless; suicidal thoughts or actions; or other changes in mood or behavior, and all other concerns. Patient educated on medication compliance and the risks of suddenly stopping this medication or missing doses. Patient verbalized understanding and is agreeable to taking Duloxetine. Addressed all questions and concerns.     Patient  presentation seems most consistent with bipolar depression with mood fluctuations appearing to be more related to physical pain.  As patient reports having 2-3 days per month of feeling low which last for 1 day.  Will continue current regimen as patient has been taking Lamictal 200 mg for 12 yrs which has been effective.    Patient to contact provider if symptoms worsen or fail to improve.        Patient screened positive for depression based on a PHQ-9 score of 9 on 7/7/2024. Follow-up recommendations include: Prescribed antidepressant medication treatment and Suicide Risk Assessment performed.       TREATMENT PLAN/GOALS: Continue supportive psychotherapy efforts and medications as indicated. Treatment and medication options discussed during today's visit. Patient acknowledged and verbally consented to continue with current treatment plan and was educated on the importance of compliance with treatment and follow-up appointments.    MEDICATION ISSUES:  DEANDRE reviewed as expected.  Discussed medication options and treatment plan of prescribed medication as well as the risks, benefits, and side effects including potential falls, possible impaired driving and metabolic adversities among others. Patient is agreeable to call the office with any worsening of symptoms or onset of side effects. Patient is agreeable to call 911 or go to the nearest ER should he/she begin having SI/HI. No medication side effects or related complaints today.     MEDS ORDERED DURING VISIT:  New Medications Ordered This Visit   Medications    DULoxetine HCl 40 MG capsule delayed-release particles     Sig: Take 1 capsule by mouth Every Morning     Dispense:  60 capsule     Refill:  0     Dose increase, if insurance will not cover the 40 mg capsule please notify provider       Return in about 6 weeks (around 8/22/2024) for medication check.         I spent 34 minutes caring for Anthony on this date of service. This time includes time spent by me in  the following activities: preparing for the visit, reviewing tests, obtaining and/or reviewing a separately obtained history, performing a medically appropriate examination and/or evaluation, counseling and educating the patient/family/caregiver, ordering medications, tests, or procedures, referring and communicating with other health care professionals, documenting information in the medical record, care coordination, and scheduling .      This document has been electronically signed by RAY Yates  July 11, 2024 08:26 EDT      Part of this note may be an electronic transcription/translation of spoken language to printed text using the Dragon Dictation System.

## 2024-08-06 DIAGNOSIS — F31.75 BIPOLAR DISORDER, IN PARTIAL REMISSION, MOST RECENT EPISODE DEPRESSED: ICD-10-CM

## 2024-08-06 DIAGNOSIS — M17.11 PRIMARY OSTEOARTHRITIS OF RIGHT KNEE: ICD-10-CM

## 2024-08-06 DIAGNOSIS — G89.29 OTHER CHRONIC PAIN: ICD-10-CM

## 2024-08-07 RX ORDER — CELECOXIB 200 MG/1
200 CAPSULE ORAL DAILY
Qty: 30 CAPSULE | Refills: 11 | Status: SHIPPED | OUTPATIENT
Start: 2024-08-07

## 2024-08-07 RX ORDER — DULOXETIN HYDROCHLORIDE 20 MG/1
CAPSULE, DELAYED RELEASE ORAL
Qty: 90 CAPSULE | Refills: 3 | OUTPATIENT
Start: 2024-08-07

## 2024-08-15 DIAGNOSIS — F31.70 BIPOLAR DISORDER, CURRENTLY IN REMISSION: ICD-10-CM

## 2024-08-16 RX ORDER — LAMOTRIGINE 200 MG/1
200 TABLET ORAL EVERY MORNING
Qty: 90 TABLET | Refills: 3 | OUTPATIENT
Start: 2024-08-16

## 2024-08-16 NOTE — TELEPHONE ENCOUNTER
REFILL REQUEST FROM THE PHARMACY FOR PTS LAMOTRIGINE 200 MG TABLETS.    lamoTRIgine (LaMICtal) 200 MG tablet (09/06/2023)     FOLLOW UP APPT ON 08/23/2024.  PT LAST SEEN ON 07/11/2024.

## 2024-08-23 ENCOUNTER — OFFICE VISIT (OUTPATIENT)
Dept: BEHAVIORAL HEALTH | Facility: CLINIC | Age: 59
End: 2024-08-23
Payer: COMMERCIAL

## 2024-08-23 VITALS
OXYGEN SATURATION: 97 % | DIASTOLIC BLOOD PRESSURE: 80 MMHG | WEIGHT: 281 LBS | SYSTOLIC BLOOD PRESSURE: 130 MMHG | HEIGHT: 69 IN | BODY MASS INDEX: 41.62 KG/M2 | HEART RATE: 82 BPM

## 2024-08-23 DIAGNOSIS — Z79.899 MEDICATION MANAGEMENT: ICD-10-CM

## 2024-08-23 DIAGNOSIS — F31.70 BIPOLAR DISORDER, CURRENTLY IN REMISSION: Primary | ICD-10-CM

## 2024-08-23 DIAGNOSIS — F41.1 GENERALIZED ANXIETY DISORDER: ICD-10-CM

## 2024-08-23 DIAGNOSIS — G89.29 OTHER CHRONIC PAIN: ICD-10-CM

## 2024-08-23 RX ORDER — DULOXETINE 40 MG/1
40 CAPSULE, DELAYED RELEASE ORAL EVERY MORNING
Qty: 90 CAPSULE | Refills: 3 | Status: SHIPPED | OUTPATIENT
Start: 2024-08-23 | End: 2025-08-23

## 2024-08-23 RX ORDER — LAMOTRIGINE 200 MG/1
200 TABLET ORAL EVERY MORNING
Qty: 90 TABLET | Refills: 3 | Status: SHIPPED | OUTPATIENT
Start: 2024-08-23 | End: 2025-08-23

## 2024-08-23 NOTE — PROGRESS NOTES
"  Subjective   Anthony Brown is a 58 y.o. male who presents today for follow up    Referring Provider:  No referring provider defined for this encounter.    Chief Complaint:  bipolar disorder-depression, anxiety, chronic pain, medication management     Answers submitted by the patient for this visit:  Primary Reason for Visit (Submitted on 8/21/2024)  What is the primary reason for your visit?: Other  Other (Submitted on 8/21/2024)  Please describe your symptoms.: Follow up for meds  Have you had these symptoms before?: Yes  How long have you been having these symptoms?: Greater than 2 weeks    History of Present Illness:    8/23/24:  Patient presents today in office, at last visit Cymbalta was increased from 20 to 40 mg every morning, for which patient reports \"most days I am doing pretty good.\"  Patient disability has been approved, which patient was pleased as he applied 2 yrs ago. Patient recalls some of his bad days were due to anxiety about disability, though now he is thinking and processing \"I am disabled\", though patient is in good spirits.  Overall with dose increase of Cymbalta pain has been under better control, as well as mood.  Tolerating dose well.     Denies: inflated self-esteem or grandiosity, decreased need for sleep, increased talkativeness, flight of ideas or racing thoughts, distractibility, increase in goal-directed activity or psychomotor agitation, and increase in risky behavior      7/11/24:   Answers submitted by the patient for this visit:  Primary Reason for Visit (Submitted on 7/7/2024)  What is the primary reason for your visit?: Depression  Depression (Submitted on 7/7/2024)  Chief Complaint: Depression  Visit: follow-up  Frequency: most days  Severity: moderate  excessive worry: No  insomnia: Yes  irritability: Yes  malaise/fatigue: Yes  obsessions: No  hypersomnia: Yes  difficulty controlling mood: Yes  difficulty staying asleep: No  difficulty falling asleep: No  Hours of sleep " "per night: 9 Hours  Medication compliance: %  Side effects: fatigue  Aggravated by: family issues    Patient presents today in office reported 2 weeks ago had accidentally taken all routine morning medications twice, as patient got mixed up on the day he had removed the pills from medi-planner, wife had mentioned patient had done better that day. \"I will admit it was a good day.\" Denies any adverse reactions.     Patient did finally have disability hearing, which seemed positive from patient perspective, will learn of decision in 2 months.  Which has been stressful.      Patient has been taking more naps lately, due to chronic pain-right shoulder, sleep has been disturbed. Which has been ongoing since long Covid.     Depression: Patient complains of depression. He complains of anhedonia, depressed mood, difficulty concentrating, and fatigue  Anxiety:  The patient endorses to symptoms of anxiety including:  anxiousness, being easily fatigued, difficulty concentrating or mind going blank, and irritability which have caused impairment in important areas of daily functioning.  Denies: inflated self-esteem or grandiosity, decreased need for sleep, increased talkativeness, flight of ideas or racing thoughts, distractibility, increase in goal-directed activity or psychomotor agitation, and increase in risky behavior      7/11/23:    Answers submitted by the patient for this visit:  Primary Reason for Visit (Submitted on 7/6/2023)  What is the primary reason for your visit?: Other  Other (Submitted on 7/6/2023)  Please describe your symptoms.: None  Have you had these symptoms before?: Yes  How long have you been having these symptoms?: Greater than 2 weeks    Patient presents today in office for Lamictal refills.  Patient had requested refills previously which were denied due to patient not following up with this provider, as last visit was 12/1/22.  Due to patient not having Lamictal for 4 days (7/10-7/13/23) there " was an increased risk of developing a rash, therefore, patient was prescribed 100 mg on 7/7/23 which patient started that morning.   Patient denies having a rash. Tolerating well. Patient was able to tell the medication was not on board, as temper was easily triggered, felt easily irritated. Patient does not recall ever missing more than 2 doses in the past 13 yrs.   Patient reports there were several insurance issues several months ago due to wife's employer, and switch with [employer] coverage.  Patient is now using [facility] with [place], and pharmacy may have to be changed due to insurance requirement of 90 day supply.      Patient continues to await Social Security, Disability- has been told it may be up to 1 yr as first application which took 6 months was denied approximately 1 month ago, and now  has paperwork.     Patient continues to have diarrhea despite colestipol, long COVID symptoms of joint pain and muscle aches.      Patient feels mood is doing good, and stable with Lamictal and Cymbalta.    Depression: Patient complains of depression. He complains of anhedonia, depressed mood, difficulty concentrating, fatigue, feelings of worthlessness/guilt, and insomnia  Anxiety:  The patient endorses significant symptoms of anxiety including:  anxiousness, being easily fatigued, difficulty concentrating or mind going blank, irritability, and sleep disturbance which have caused impairment in important areas of daily functioning.     Denies: inflated self-esteem or grandiosity, decreased need for sleep, increased talkativeness, flight of ideas or racing thoughts, distractibility, increase in goal-directed activity or psychomotor agitation, and increase in risky behavior      12/1/22:    Answers for HPI/ROS submitted by the patient on 11/24/2022  What is the primary reason for your visit?: Other  Please describe your symptoms.: None  Have you had these symptoms before?: Yes  How long have you been having  "these symptoms?: Greater than 2 weeks  Please list any medications you are currently taking for this condition.: Lamictal, Cymbalta  Patient presents today in office, \"I am doing good.\"  Patient reports overall mental health has improved.  Patient reports having a new diagnosis of \"long COVID\", which has relieved some anxiety about physical health decline.  As patient suffers from chronic joint pain and COVID symptoms exacerbated the inflammation.  Patient expresses no longer feeling alone in relation to long term COVID symptoms. Diarrhea has been ongoing which was told it will subside. Muscle joint pain for 9 months. Prior only had in hands, now in hands,feet,shoulders.  Patient had quit working in June 2022, originally had COVID end of Jan. 2022, however, patient is now no longer employed and has applied for Disability, Social Security.  Financial issues have been a stressor, which is starting to ease.  Wife is working via [employer] as a contract employee, though does not have health insurance.  Patient is now paying for insurance out of pocket instead of employer, [employer].     Patient has started working with physical therapy at [facility] to prepare for knee replacement in the future.  Patient is considering starting therapy, and wishes to discuss further at next visit.     Denies mood fluctuations with bipolar.  Chronic pain continues to flare at times with increased activity, which does bring mood down, though able to recover and not allowing depression to linger throughout the day.         9/20/22:  Patient presents today in office, \"well my wife says I am better. I can see the mood is up a little bit, a couple days I felt manic, but it could be that I was normal and not used to it.\"    Patient feels Cymbalta has been helpful with fibromyalgia symptoms.    Patient had to put his dog down yesterday, which patient has been feeling down.      Reports 3-4 days of having more energy and inability to sit still, 2 in a row and " "a couple of randoms \"I feel like I had a few days of being testy than others, not angry, my previous counselor had taught me how to recognize anger feelings and able to calm down.\"  \"the day after I was exhausted because pain would kick in because I did too much physically.\"   \"Not severe as wife did not notice, I just felt like I needed to be more careful that day\" in order to avoid increased irritability or anger.  Patient reported prior behavior was up and down and now mood is more \"normalized\".    Other than mentioned symptoms, patient feels mood is stable, denies feelings of depression or anxiety.     Reports symptoms of: increase in goal-directed activity or psychomotor agitation  Denies: inflated self-esteem or grandiosity, decreased need for sleep, increased talkativeness, flight of ideas or racing thoughts, distractibility and increase in risky behavior      8/16/22:  INITIAL EVAL  Patient presents today in office with a history of bipolar depression, which patient began treatment initially in 2008 for depression, which later diagnosed with bipolar.  Patient has been taking Lamictal 200 mg every morning for approximately 12 yr.  Reports starting Cymbalta approximately 2 weeks for fibromyalgia by PCP.    Patient reports having a history of \"kaleigh\" related to medications as patient was being treated for depression.  After 1.5 to 2 yrs ex-wife had contacted psychiatrist before visit and changed diagnosis to Bipolar Depression, as patient was having mood fluctuations daily, higher during the morning and lower at night.  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 \"my bipolar hits normal and it was pushing me a little up, and I still have ups and downs 2-3 times per month, having a little dip, more consistent and closer to normal now.\"  Down days only last for one day.     Little dip described as lack of " "motivation, easy to shut down and decrease activity for that day, less talkative, gets quiet, \"I just have to crash on the couch usually. I am up and down, I am aware of my surroundings, just not as good as the day before.\"  \"I haven't had a sit in the dark day for 12 yrs, medication has done well since day 1.\"     Patient recalls prior psychiatrist had thought patient had only low days rarely, though patient was having more down low days than better days.     Patient reports weekend of 8/4/22 Thurs, Fri, and Sat wife had mentioned patient was more active than normal, pain was less, sleeping 5 hrs/night and normally 7-8 hrs/night, and patient thought the Cymbalta was working, and Sunday and Monday on the lower side of normal, patient uncertain if mood was good or hyper, \"I was up running around, I slept well just not very long.\"  On Sunday and Monday patient noted increased pain which effected mood, as patient recalls feeling depressed, \"the pain came back and I didn't have that motivation.\"  Patient reports on days mood is better as pain is not as active, though patient reports he is not surprised by the cycle of feeling good for few days then feeling down as pain varies throughout body.     Patient denies episodes or symptoms of true hypomania.     Patient voices desire to return to work, as patient is on short term disability for chronic pain and possible fibromyalgia.  Patient had missed 8-10 days of work in May and only worked 6 days in June.  Patient reports pain is worse upon awakening which eases off as day progresses, however, with activity pain worsens later in the day.  Patient also having trouble with diarrhea for the last several months, since Feb., and recalls having trouble walking to bathroom at employer due to distance.       PHQ-9 Depression Screening  PHQ-9 Total Score:   7/7/2024 9 (PHQ2-2)    Little interest or pleasure in doing things?     Feeling down, depressed, or hopeless?     Trouble " falling or staying asleep, or sleeping too much?     Feeling tired or having little energy?     Poor appetite or overeating?     Feeling bad about yourself - or that you are a failure or have let yourself or your family down?     Trouble concentrating on things, such as reading the newspaper or watching television?     Moving or speaking so slowly that other people could have noticed? Or the opposite - being so fidgety or restless that you have been moving around a lot more than usual?     Thoughts that you would be better off dead, or of hurting yourself in some way?     PHQ-9 Total Score       NAM-7    7/7/2024 4    The following portions of the patient's history were reviewed and updated as appropriate: allergies, current medications, past family history, past medical history, past social history, and past surgical history.     Past Surgical History:  Past Surgical History:   Procedure Laterality Date    COLONOSCOPY  2019?    HERNIA REPAIR      Inguinal    JOINT REPLACEMENT  06/2017    Right knee    KNEE SURGERY Right 1985    UT ARTHRP KNE CONDYLE&PLATU MEDIAL&LAT COMPARTMENTS N/A 06/26/2017    Procedure: RIGHT TOTAL KNEE ARTHROPLASTY, LEFT KNEE INJECTED WITH CORTISONE;  Surgeon: Bradford Torres MD;  Location: [facility];  Service: Orthopedics    TONSILLECTOMY  1970    VASECTOMY  1998       Problem List:  Patient Active Problem List   Diagnosis    Primary osteoarthritis of right knee    Other chronic pain    DDD (degenerative disc disease), lumbar    Lumbar radiculopathy    Testosterone deficiency    Abreu's esophagus with dysplasia, unspecified    Bipolar disorder    Depression    High cholesterol    Hypertension    Low back pain    Sleep apnea with use of continuous positive airway pressure (CPAP)    Testicular hypofunction    Class 3 severe obesity due to excess calories without serious comorbidity with body mass index (BMI) of 40.0 to 44.9 in adult    Diarrhea    Numbness in feet    Long term (current)  use of non-steroidal anti-inflammatories (nsaid)    Neuropathy    Type 2 diabetes mellitus with diabetic neuropathy, without long-term current use of insulin       Allergy:   No Known Allergies     Discontinued Medications:  Medications Discontinued During This Encounter   Medication Reason    lamoTRIgine (LaMICtal) 200 MG tablet Reorder    DULoxetine HCl 40 MG capsule delayed-release particles Reorder    gabapentin (NEURONTIN) 100 MG capsule Patient Reported Not Taking             Current Medications:   Current Outpatient Medications   Medication Sig Dispense Refill    atorvastatin (LIPITOR) 10 MG tablet Take 1 tablet by mouth Daily. 90 tablet 1    celecoxib (CeleBREX) 200 MG capsule TAKE 1 CAPSULE BY MOUTH DAILY 30 capsule 11    Cholecalciferol (VITAMIN D3) 5000 UNITS capsule capsule Take 2,000 Units by mouth Daily.      colestipol (COLESTID) 1 g tablet TAKE 1 TABLET BY MOUTH TWICE  DAILY WITH MEALS 180 tablet 3    dicyclomine (BENTYL) 10 MG capsule TAKE 1 CAPSULE BY MOUTH 4 TIMES  DAILY BEFORE MEALS AND AT  BEDTIME 120 capsule 0    DULoxetine HCl 40 MG capsule delayed-release particles Take 1 capsule by mouth Every Morning. Indications: Fibromyalgia Syndrome, Generalized Anxiety Disorder, Major Depressive Disorder 90 capsule 3    fenofibrate 160 MG tablet TAKE 1 TABLET BY MOUTH DAILY 90 tablet 3    fluticasone (FLONASE) 50 MCG/ACT nasal spray Use 2 sprays into the nostril(s) as directed by provider Daily. 16 g 3    lamoTRIgine (LaMICtal) 200 MG tablet Take 1 tablet by mouth Every Morning. Indications: Depressive Phase of Manic-Depression 90 tablet 3    lisinopril (PRINIVIL,ZESTRIL) 20 MG tablet Take 1 tablet by mouth Daily. 90 tablet 1    multivitamin with minerals tablet tablet Take 1 tablet by mouth Daily.      omeprazole (priLOSEC) 20 MG capsule TAKE 1 CAPSULE BY MOUTH DAILY 90 capsule 3    Probiotic Product (PROBIOTIC PO) Take 1 capsule by mouth Daily. PT TO HOLD MED PRIOR TO OR      vitamin B-12  "(CYANOCOBALAMIN) 1000 MCG tablet Take 5 tablets by mouth Daily.       No current facility-administered medications for this visit.       Past Medical History:  Past Medical History:   Diagnosis Date    Acute URI     Arthritis     bialteral knees    Abreu's esophagus with dysplasia, unspecified     Bipolar disorder     Callus Always    Chronic pain disorder     Colon polyp 2019?    Contact with and (suspected) exposure to other viral communicable diseases     Depression     Deviated nasal septum     Difficulty walking Feb 2022    Random pain    GERD (gastroesophageal reflux disease) 2008    High cholesterol     History of chest pain     6 YEARS AGO, HAD STRESS TEST NORMAL    Hypertension     Ingrown toenail Childhood    Low back pain     Low testosterone level in male     HAS TESTOSTERONE PELLETS IMPLANTED    Numbness in feet     Obesity     Pain in joint     UNSPECIFIED    Plantar fasciitis 1998    No other occurrence    Shin splints Random over years    During or after exercise    Sinusitis     Sleep apnea with use of continuous positive airway pressure (CPAP)     Sleep apnea, unspecified     SOB (shortness of breath)     Testicular hypofunction     Testosterone deficiency        Past Psychiatric History:  Began Treatment: 2008  Diagnoses:Depression and Bipolar Depression  Psychiatrist: 2 visits around 2010  Therapist: last seen approximately 8 yr ago(2016)-helpful at the time  Admission History:Denies  Medication Trials: Uncertain, \"tried 3-4 meds for a few months that didn't have much effect.\" prior to 2010  Self Harm: Denies  Suicide Attempts:Denies      Substance Abuse History:   Types:Denies all, including illicit  Withdrawal Symptoms:Denies  Longest Period Sober:Not Applicable   AA: Not applicable     Social History: Updated 8/23/24  Martial Status:  Employed:No Disability  Kids:Yes or If so, how many 2  House:Lives in a house   History: Denies  Access to Guns:  Yes, \"most are put " "away\"    Social History     Socioeconomic History    Marital status:     Number of children: 2   Tobacco Use    Smoking status: Former     Current packs/day: 0.00     Average packs/day: 0.3 packs/day for 3.7 years (0.9 ttl pk-yrs)     Types: Cigarettes, Pipe, Cigars     Start date: 1981     Quit date: 1984     Years since quittin.9    Smokeless tobacco: Never    Tobacco comments:     smoked as a teenager   Vaping Use    Vaping status: Never Used   Substance and Sexual Activity    Alcohol use: Yes     Alcohol/week: 3.0 standard drinks of alcohol     Types: 3 Cans of beer per week     Comment: socially    Drug use: Never     Types: Marijuana     Comment: in college    Sexual activity: Yes     Partners: Female     Birth control/protection: Post-menopausal, Surgical       Family History:   Suicide Attempts: Denies  Suicide Completions:Denies      Family History   Problem Relation Age of Onset    Diabetes Mother     Hypertension Mother     Neuropathy Mother     Depression Father     Colon polyps Father     Stroke Father     Skin cancer Father         unknown type    Depression Sister     Hypertension Sister     Rheum arthritis Sister     Arthritis Sister     ADD / ADHD Nephew     Malig Hyperthermia Neg Hx        Developmental History:   Born: [place]  Siblings:1 sister  Childhood: Denies Abuse  High School:Completed  College: some    Mental Status Exam:   Hygiene:   good  Cooperation:  Cooperative  Eye Contact:  Good  Psychomotor Behavior:  Appropriate  Affect:  Appropriate  Mood: euthymic   Speech:  Normal  Thought Process:  Goal directed  Thought Content:  Mood congruent  Suicidal:  None  Homicidal:  None  Hallucinations:  None  Delusion:  None  Memory:  Intact  Orientation:  Person, Place, Time and Situation  Reliability:  good  Insight:  Good  Judgement:  Good  Impulse Control:  Good  Physical/Medical Issues:  Yes HTN,HLD,Obesity,Barretts esophaagus,LBP,OA,Lumbar DDD,JEANNIE-CPAP,Chronic leg " "pain, paresthesia dorothy feet, testoterone def.  DM new 10/2023, long COVID with chronic fecal urgency w/diarrhea    Review of Systems:  Review of Systems   Constitutional:  Negative for diaphoresis, fatigue and fever.   HENT:  Negative for drooling.    Eyes:  Negative for visual disturbance.   Respiratory:  Positive for apnea. Negative for cough, chest tightness and shortness of breath.    Cardiovascular:  Negative for chest pain, palpitations and leg swelling.   Gastrointestinal:  Positive for diarrhea. Negative for abdominal pain, nausea and vomiting.        Chronic fecal urgency with diarrhea    Endocrine: Negative for cold intolerance and heat intolerance.   Genitourinary:  Negative for difficulty urinating.   Musculoskeletal:  Positive for myalgias. Negative for back pain and neck pain.        Right shoulder chronic pain   Allergic/Immunologic: Negative for immunocompromised state.   Neurological:  Negative for dizziness, seizures, syncope, speech difficulty, weakness, light-headedness, numbness and headaches.   Psychiatric/Behavioral:  Positive for sleep disturbance. Negative for confusion, decreased concentration, hallucinations, self-injury and suicidal ideas. The patient is nervous/anxious. The patient is not hyperactive.          Physical Exam:  Physical Exam  Psychiatric:         Attention and Perception: Attention and perception normal.         Mood and Affect: Mood and affect normal.         Speech: Speech normal.         Behavior: Behavior normal. Behavior is cooperative.         Thought Content: Thought content normal. Thought content does not include suicidal ideation. Thought content does not include suicidal plan.         Cognition and Memory: Cognition and memory normal.         Judgment: Judgment normal.         Vital Signs:   /80   Pulse 82   Ht 175.3 cm (69\")   Wt 127 kg (281 lb)   SpO2 97%   BMI 41.50 kg/m²        Lab Results:   Office Visit on 06/07/2024   Component Date Value Ref " Range Status    Hemoglobin A1C 06/07/2024 6.3 (A)  4.5 - 5.7 % Final    Lot Number 06/07/2024 [id number]   Final    Expiration Date 06/07/2024 02.12.26   Final   Office Visit on 04/19/2024   Component Date Value Ref Range Status    Amphetamine Screen, Urine 04/19/2024 Negative  Negative Final    Barbiturates Screen, Urine 04/19/2024 Negative  Negative Final    Buprenorphine, Screen, Urine 04/19/2024 Negative  Negative Final    Benzodiazepine Screen, Urine 04/19/2024 Negative  Negative Final    Cocaine Screen, Urine 04/19/2024 Negative  Negative Final    MDMA (ECSTASY) 04/19/2024 Negative  Negative Final    Methamphetamine, Ur 04/19/2024 Negative  Negative Final    Morphine/Opiates Screen, Urine 04/19/2024 Negative  Negative Final    Methadone Screen, Urine 04/19/2024 Negative  Negative Final    Oxycodone Screen, Urine 04/19/2024 Negative  Negative Final    Phencyclidine (PCP), Urine 04/19/2024 Negative  Negative Final    THC, Screen, Urine 04/19/2024 Negative  Negative Final    Lot Number 04/19/2024 [id number]   Final    Expiration Date 04/19/2024 11/12/25   Final   Lab on 04/17/2024   Component Date Value Ref Range Status    Total Cholesterol 04/17/2024 181  0 - 200 mg/dL Final    Triglycerides 04/17/2024 424 (H)  0 - 150 mg/dL Final    HDL Cholesterol 04/17/2024 21 (L)  40 - 60 mg/dL Final    LDL Cholesterol  04/17/2024 90  0 - 100 mg/dL Final    VLDL Cholesterol 04/17/2024 70 (H)  5 - 40 mg/dL Final    LDL/HDL Ratio 04/17/2024 3.58   Final    Hemoglobin A1C 04/17/2024 5.70 (H)  4.80 - 5.60 % Final    Glucose 04/17/2024 107 (H)  65 - 99 mg/dL Final    BUN 04/17/2024 15  6 - 20 mg/dL Final    Creatinine 04/17/2024 1.05  0.76 - 1.27 mg/dL Final    Sodium 04/17/2024 141  136 - 145 mmol/L Final    Potassium 04/17/2024 3.6  3.5 - 5.2 mmol/L Final    Chloride 04/17/2024 103  98 - 107 mmol/L Final    CO2 04/17/2024 27.3  22.0 - 29.0 mmol/L Final    Calcium 04/17/2024 9.7  8.6 - 10.5 mg/dL Final    Total Protein 04/17/2024  6.9  6.0 - 8.5 g/dL Final    Albumin 04/17/2024 4.2  3.5 - 5.2 g/dL Final    ALT (SGPT) 04/17/2024 45 (H)  1 - 41 U/L Final    AST (SGOT) 04/17/2024 34  1 - 40 U/L Final    Alkaline Phosphatase 04/17/2024 101  39 - 117 U/L Final    Total Bilirubin 04/17/2024 0.3  0.0 - 1.2 mg/dL Final    Globulin 04/17/2024 2.7  gm/dL Final    A/G Ratio 04/17/2024 1.6  g/dL Final    BUN/Creatinine Ratio 04/17/2024 14.3  7.0 - 25.0 Final    Anion Gap 04/17/2024 10.7  5.0 - 15.0 mmol/L Final    eGFR 04/17/2024 82.8  >60.0 mL/min/1.73 Final       EKG Results:  No orders to display       Imaging Results:  No Images in the past 120 days found..      Assessment & Plan   Diagnoses and all orders for this visit:    1. Bipolar disorder, currently in remission (Primary)  -     DULoxetine HCl 40 MG capsule delayed-release particles; Take 1 capsule by mouth Every Morning. Indications: Fibromyalgia Syndrome, Generalized Anxiety Disorder, Major Depressive Disorder  Dispense: 90 capsule; Refill: 3  -     lamoTRIgine (LaMICtal) 200 MG tablet; Take 1 tablet by mouth Every Morning. Indications: Depressive Phase of Manic-Depression  Dispense: 90 tablet; Refill: 3    2. Generalized anxiety disorder  -     DULoxetine HCl 40 MG capsule delayed-release particles; Take 1 capsule by mouth Every Morning. Indications: Fibromyalgia Syndrome, Generalized Anxiety Disorder, Major Depressive Disorder  Dispense: 90 capsule; Refill: 3    3. Other chronic pain  -     DULoxetine HCl 40 MG capsule delayed-release particles; Take 1 capsule by mouth Every Morning. Indications: Fibromyalgia Syndrome, Generalized Anxiety Disorder, Major Depressive Disorder  Dispense: 90 capsule; Refill: 3    4. Medication management              Visit Diagnoses:    ICD-10-CM ICD-9-CM   1. Bipolar disorder, currently in remission  F31.70 296.80   2. Generalized anxiety disorder  F41.1 300.02   3. Other chronic pain  G89.29 338.29   4. Medication management  Z79.899 V58.69              PLAN:  Safety: No acute safety concerns  Therapy: Declines   Risk Assessment: Risk of self-harm acutely is low.  Risk factors include mood disorder, access to guns/weapons, and recent psychosocial stressors (pandemic). Protective factors include no family history, no present SI, no history of suicide attempts or self-harm in the past, minimal AODA, healthcare seeking, future orientation, willingness to engage in care.  Risk of self-harm chronically is also low, but could be further elevated in the event of treatment noncompliance and/or AODA.  Meds:   Continue Lamictal 200 mg by mouth daily in the morning to target mood.  Discussed all risks, benefits, alternatives, and side effects of Lamictal, including but not limited to rash, rebound depressive or manic symptoms if prompt discontinuation, GI upset, agitation, and sedation. Patient instructed to avoid driving and doing other tasks or actions that require to be alert until knowing how the drug affects them. Discussed the need for patient to immediately call the office for any new or worsening symptoms, such as rash, worsening depression, feeling nervous or restless; suicidal thoughts or actions, or other changes changes in mood or behavior, and all other concerns. Patient educated on medication compliance and the risks of suddenly stopping this medication or missing doses. Patient verbalized understanding and is agreeable to taking Lamictal. Addressed all questions and concerns. Refilled for 90 days with 3 refills to [facility] home delivery pharmacy.  Continue Cymbalta 40 mg by mouth daily in the morning to target mood and chronic pain. Discussed all risks, benefits, alternatives, and side effects of Duloxetine including but not limited to GI upset, sexual dysfunction, bleeding risk, seizure risk, weight loss, insomnia, diaphoresis, drowsiness, headache, dizziness, fatigue, activation of kaleigh or hypomania, increased fragility fracture risk, hyponatremia,  increased BP, hepatotoxicity, ocular effects, withdrawal syndrome following abrupt discontinuation, serotonin syndrome, and activation of suicidal ideation and behavior. Patient educated on the need to practice safe sex while taking this medication. Discussed the need for patient to immediately call the office for any new or worsening symptoms, such as worsening depression; feeling nervous or restless; suicidal thoughts or actions; or other changes in mood or behavior, and all other concerns. Patient educated on medication compliance and the risks of suddenly stopping this medication or missing doses. Patient verbalized understanding and is agreeable to taking Duloxetine. Addressed all questions and concerns. Refilled for 90 days with 3 refills to [facility] home delivery pharmacy.  5.   Labs: n/a     Symptoms of anxiety, bipolar depression are under good control with current medication regimen.    The plan was discussed with the patient. The patient was given time to ask questions and these questions were answered. At the conclusion of their visit they had no additional questions or concerns and all questions were answered to their satisfaction.   Patient was given instructions and counseling regarding condition and for health maintenance advice. Please see specific information pulled into the AVS if appropriate.    Patient to contact provider if symptoms worsen or fail to improve.        7/11/24:  -Continue Lamictal 200 mg by mouth daily in the morning to target mood.  NR needed, plan on sending refills at next visit.     -INCREASE Cymbalta FROM 20 mg TO 40 mg by mouth daily in the morning to target mood and chronic pain. Refilled today for 60 days to [facility] [place] pharmacy and will plan on refilling next time to mail order pharmacy if dose is effective and tolerated.  Patient instructed to inform provider if insurance will not cover the 40 mg caps.     Symptoms of anxiety, bipolar depression, are under fair to good control  with current medication regimen. Due to chronic shoulder pain and generalized pain sleep has been impacted, though patient also has other comorbid health conditions.    The plan was discussed with the patient. The patient was given time to ask questions and these questions were answered. At the conclusion of their visit they had no additional questions or concerns and all questions were answered to their satisfaction.   Patient was given instructions and counseling regarding condition and for health maintenance advice. Please see specific information pulled into the AVS if appropriate.    Patient to contact provider if symptoms worsen or fail to improve.        7/11/23:  Increase Lamictal FROM 100 mg back  mg by mouth daily in the morning to target mood.  Discussed all risks, benefits, alternatives, and side effects of Lamictal, including but not limited to rash, rebound depressive or manic symptoms if prompt discontinuation, GI upset, agitation, and sedation. Patient instructed to avoid driving and doing other tasks or actions that require to be alert until knowing how the drug affects them. Discussed the need for patient to immediately call the office for any new or worsening symptoms, such as rash, worsening depression, feeling nervous or restless; suicidal thoughts or actions, or other changes changes in mood or behavior, and all other concerns. Patient educated on medication compliance and the risks of suddenly stopping this medication or missing doses. Patient verbalized understanding and is agreeable to taking Lamictal. Addressed all questions and concerns. Refilled today for 90 days with 3 refills.    Continue Cymbalta 20 mg by mouth daily in the morning to target mood and chronic pain.  Patient instructed to request refill when needed.      Symptoms of anxiety, bipolar depression, are under good control with current medication regimen.  Patient to follow up in 1 yr or sooner if needed.   Patient was  given instructions and counseling regarding condition and for health maintenance advice. Please see specific information pulled into the AVS if appropriate.    Patient to contact provider if symptoms worsen or fail to improve.      7/7/23:   Lamictal 100 mg x5 days ordered and sent to [facility], as patient reported last dose 7/2/23, due to potential rash if missed 5 days or more, will plan on returning to 200 mg dose after visit scheduled for 7/11/23, as patient has not been seen since 12/1/22.     12/1/22:   -Therapy: Declines considering and wishes to discuss further at next visit after insurance determined.   -Continue Lamictal 200 mg by mouth daily in the morning to target mood.  -Continue Cymbalta 20 mg by mouth daily in the morning to target mood and chronic pain. Tolerating well, noted improvement with mood.  Refilled today for 90 days with 1 refill.     -Symptoms of bipolar depression, are under good control with current medication regimen.  Anxiety has decreased with new knowledge received per ID providers.  Patient is awaiting for response from social security and disability.    Patient to contact provider if symptoms worsen or fail to improve.     10/6/22:   Continue Lamictal 200 mg by mouth daily in the morning to target mood.    Continue Cymbalta 20 mg by mouth daily in the morning to target mood and chronic pain. Tolerating well, noted improvement with mood.     Symptoms described today of increased energy suspect related to Cymbalta effectiveness as patient able to complete more physical tasks and other than having a few days of increased energy patient denied other negative symptoms. Will continue same dose of Cymbalta for now and reassess at next appointment.   Patient to contact provider if symptoms worsen or fail to improve.     8/16/22:   -Declines therapy   Continue Lamictal 200 mg by mouth daily in the morning to target mood. Risks, benefits, alternatives discussed with patient  including rash, rebound depressive or manic symptoms if prompt discontinuation, GI upset, agitation, sedation/falls risk.  After discussion of these risks and benefits, patient voiced understanding and agreed to proceed. Refilled today  Continue Cymbalta 20 mg by mouth daily in the morning to target mood and chronic pain as previously started by PCP on 8/3/22.  Discussed all risks, benefits, alternatives, and side effects of Duloxetine including but not limited to GI upset, sexual dysfunction, bleeding risk, seizure risk, weight loss, insomnia, diaphoresis, drowsiness, headache, dizziness, fatigue, activation of kaleigh or hypomania, increased fragility fracture risk, hyponatremia, increased BP, hepatotoxicity, ocular effects, withdrawal syndrome following abrupt discontinuation, serotonin syndrome, and activation of suicidal ideation and behavior. Patient educated on the need to practice safe sex while taking this medication. Discussed the need for patient to immediately call the office for any new or worsening symptoms, such as worsening depression; feeling nervous or restless; suicidal thoughts or actions; or other changes in mood or behavior, and all other concerns. Patient educated on medication compliance and the risks of suddenly stopping this medication or missing doses. Patient verbalized understanding and is agreeable to taking Duloxetine. Addressed all questions and concerns.     Patient presentation seems most consistent with bipolar depression with mood fluctuations appearing to be more related to physical pain.  As patient reports having 2-3 days per month of feeling low which last for 1 day.  Will continue current regimen as patient has been taking Lamictal 200 mg for 12 yrs which has been effective.    Patient to contact provider if symptoms worsen or fail to improve.        Patient screened positive for depression based on a PHQ-9 score of 9 on 7/7/2024. Follow-up recommendations include: Prescribed  antidepressant medication treatment and Suicide Risk Assessment performed.       TREATMENT PLAN/GOALS: Continue supportive psychotherapy efforts and medications as indicated. Treatment and medication options discussed during today's visit. Patient acknowledged and verbally consented to continue with current treatment plan and was educated on the importance of compliance with treatment and follow-up appointments.    MEDICATION ISSUES:  DEANDRE reviewed as expected.  Discussed medication options and treatment plan of prescribed medication as well as the risks, benefits, and side effects including potential falls, possible impaired driving and metabolic adversities among others. Patient is agreeable to call the office with any worsening of symptoms or onset of side effects. Patient is agreeable to call 911 or go to the nearest ER should he/she begin having SI/HI. No medication side effects or related complaints today.     MEDS ORDERED DURING VISIT:  New Medications Ordered This Visit   Medications    DULoxetine HCl 40 MG capsule delayed-release particles     Sig: Take 1 capsule by mouth Every Morning. Indications: Fibromyalgia Syndrome, Generalized Anxiety Disorder, Major Depressive Disorder     Dispense:  90 capsule     Refill:  3    lamoTRIgine (LaMICtal) 200 MG tablet     Sig: Take 1 tablet by mouth Every Morning. Indications: Depressive Phase of Manic-Depression     Dispense:  90 tablet     Refill:  3       Return in about 1 year (around 8/23/2025) for medication check.         I spent 23 minutes caring for Anthony on this date of service. This time includes time spent by me in the following activities: preparing for the visit, performing a medically appropriate examination and/or evaluation, counseling and educating the patient/family/caregiver, ordering medications, tests, or procedures, referring and communicating with other health care professionals, documenting information in the medical record, care  coordination, and scheduling .    This document has been electronically signed by RAY Yates  August 23, 2024 12:52 EDT      Part of this note may be an electronic transcription/translation of spoken language to printed text using the Dragon Dictation System.

## 2024-08-23 NOTE — PATIENT INSTRUCTIONS
"1. Should you want to get in touch with your provider, RAY Yates, please contact MY Medical Assistant, Shawna, directly at [phone].  Recommend saving Shawna's direct number in phone as this is the PREFERRED & EASIEST way to get in contact with your provider.  Please leave a voice mail if you do not get an answer and she will return your call within 24 hrs. You will NOT be able to contact provider on [email or web address]. comt, as Behavioral Health Providers are restricted. YOU MUST CALL [phone]  If you need to speak with the on call provider after hours or on weekends, please Contact the [facility] ([phone]) and staff will be able to page the provider on call directly.     2.  In the event you need to cancel an appointment, please notify the office at least 24 hrs prior:   Contact **Shawna Medical Assistant at [facility] directly at [phone] or the [facility] ([phone])     3. MEDICATION REFILLS:  PLEASE CALL THE PHARMACY TO REQUEST ALL MEDICATION REFILLS or via [employer] TO ENSURE YOU ARE RECEIVING YOUR MEDICATIONS IN A TIMELY MANNER. The pharmacy or [facility] will send this request ELECTRONICALLY to the ordering provider.   IF YOU USE AN AUTOMATED SERVICE AT THE PHARMACY FOR REFILLS AND ARE TOLD THERE ARE \"NO REFILLS REMAINING\"   PLEASE CALL THE PHARMACY & SPEAK TO A LIVE PERSON TO VERIFY IT IS THE MOST UP TO DATE PRESCRIPTION ON FILE.    All new prescriptions will have a different number, therefore, if you were given refills for a medication today or at last visit it will not have the same number as the previous prescription.     Going forward any medications for your mental health including any previously prescribed by your primary care provider will now be managed by RAY Yates. Contact provider's MA INITIALLY when down to 5-7 days remaining to ensure new refill(s) will have this provider name on prescription for future refills.  If requested from current bottle, via " "[facility], or via pharmacy the request would be directed to that ordering provider. And to prevent confusion, inaccurate dosing, inaccurate medication refills, the management of psychotropic and/or mental health medications should only be ordered by this mental health provider.    4.  In the event you have personal crisis, contact the following crisis numbers: Suicide Prevention Hotline 1-933.210.6952 or *988, [facility] Helpline 7-813-516-FOFY; [facility] Emergency Room [phone]; text HELLO to 315710; or 911.  If you feel like harming yourself or others, call 911 right away.  You can call the [facility] Suicide and Crisis Lifeline at  988   to speak with a counselor at the lifeline, or you can connect with one using their online chat  .    5.  Never stop an antidepressant medicine without first talking to a healthcare provider. Suddenly stopping this type of medication can cause withdrawal symptoms.    6.  Counseling and talk therapy  Counseling or therapy teaches you new coping skills and more adaptive ways of thinking about problems. These tools can help you make positive changes. The benefits of counseling often last long after treatment sessions have stopped.    7.   We would appreciate your feedback, please scan the QRS code on the back of your appointment card (or see below) and complete a brief survey.  [place] location is still not available, so please click \"Center\" location.  Thank you      SPECIFIC RECOMMENDATIONS:     1.      Medications discussed at this encounter:                   -  Refilled Lamictal and Cymbalta for 90 days with 3 refills to [facility] pharmacy    2.      Psychotherapy recommendations:  Declined     3.     Return to clinic: 1 yr Friday 8/15/25 at 8 am    Please arrive at least 15 minutes before your scheduled appointment time to complete check in process.      IF you are scheduled for a [employer] VIDEO visit, YOU MUST COMPLETE THE \"E-CHECK IN\" PROCESS PRIOR TO BEGINNING THE " VISIT, You may still complete the E-Check in for in office visits prior to appointment, you will receive multiple text/email reminders which will direct you further if needed.

## 2024-08-27 DIAGNOSIS — K22.719 BARRETT'S ESOPHAGUS WITH DYSPLASIA, UNSPECIFIED: ICD-10-CM

## 2024-08-27 DIAGNOSIS — G89.29 OTHER CHRONIC PAIN: ICD-10-CM

## 2024-08-27 DIAGNOSIS — I10 PRIMARY HYPERTENSION: ICD-10-CM

## 2024-08-27 DIAGNOSIS — F31.75 BIPOLAR DISORDER, IN PARTIAL REMISSION, MOST RECENT EPISODE DEPRESSED: ICD-10-CM

## 2024-08-27 RX ORDER — DULOXETIN HYDROCHLORIDE 20 MG/1
CAPSULE, DELAYED RELEASE ORAL
Qty: 90 CAPSULE | Refills: 3 | OUTPATIENT
Start: 2024-08-27

## 2024-08-27 RX ORDER — LISINOPRIL 20 MG/1
20 TABLET ORAL DAILY
Qty: 90 TABLET | Refills: 0 | Status: SHIPPED | OUTPATIENT
Start: 2024-08-27

## 2024-08-27 NOTE — TELEPHONE ENCOUNTER
REFILL REQUEST FROM THE PHARMACY FOR PTS CYMBALTA 20 MG CAPSULES.    DULoxetine (Cymbalta) 20 MG capsule (08/21/2023)    -THIS DOSAGE FORM WAS DISCONTINUED ON 07/11/2024 DUE TO DOSE ADJUSTMENT.    FOLLOW UP APPT ON 08/15/2024.  PT LAST SEEN ON 08/23/2024.

## 2024-08-29 DIAGNOSIS — R19.7 DIARRHEA, UNSPECIFIED TYPE: ICD-10-CM

## 2024-08-30 RX ORDER — DICYCLOMINE HYDROCHLORIDE 10 MG/1
10 CAPSULE ORAL
Qty: 120 CAPSULE | Refills: 0 | Status: SHIPPED | OUTPATIENT
Start: 2024-08-30

## 2024-09-27 DIAGNOSIS — R19.7 DIARRHEA, UNSPECIFIED TYPE: ICD-10-CM

## 2024-09-30 RX ORDER — DICYCLOMINE HYDROCHLORIDE 10 MG/1
10 CAPSULE ORAL
Qty: 120 CAPSULE | Refills: 2 | Status: SHIPPED | OUTPATIENT
Start: 2024-09-30

## 2024-10-23 ENCOUNTER — OFFICE VISIT (OUTPATIENT)
Dept: FAMILY MEDICINE CLINIC | Age: 59
End: 2024-10-23
Payer: COMMERCIAL

## 2024-10-23 ENCOUNTER — LAB (OUTPATIENT)
Dept: LAB | Facility: HOSPITAL | Age: 59
End: 2024-10-23
Payer: COMMERCIAL

## 2024-10-23 VITALS
HEIGHT: 69 IN | DIASTOLIC BLOOD PRESSURE: 82 MMHG | SYSTOLIC BLOOD PRESSURE: 134 MMHG | OXYGEN SATURATION: 96 % | BODY MASS INDEX: 42.65 KG/M2 | TEMPERATURE: 98.1 F | WEIGHT: 288 LBS | HEART RATE: 72 BPM

## 2024-10-23 DIAGNOSIS — R07.89 CHEST WALL DISCOMFORT: ICD-10-CM

## 2024-10-23 DIAGNOSIS — K22.719 BARRETT'S ESOPHAGUS WITH DYSPLASIA, UNSPECIFIED: ICD-10-CM

## 2024-10-23 DIAGNOSIS — Z00.00 ROUTINE GENERAL MEDICAL EXAMINATION AT A HEALTH CARE FACILITY: Primary | ICD-10-CM

## 2024-10-23 DIAGNOSIS — E11.69 TYPE 2 DIABETES MELLITUS WITH OTHER SPECIFIED COMPLICATION, WITHOUT LONG-TERM CURRENT USE OF INSULIN: ICD-10-CM

## 2024-10-23 DIAGNOSIS — Z91.018 MULTIPLE FOOD ALLERGIES: ICD-10-CM

## 2024-10-23 DIAGNOSIS — Z79.1 LONG TERM (CURRENT) USE OF NON-STEROIDAL ANTI-INFLAMMATORIES (NSAID): ICD-10-CM

## 2024-10-23 DIAGNOSIS — E78.00 HIGH CHOLESTEROL: ICD-10-CM

## 2024-10-23 DIAGNOSIS — R06.00 DYSPNEA, UNSPECIFIED TYPE: ICD-10-CM

## 2024-10-23 DIAGNOSIS — E11.40 TYPE 2 DIABETES MELLITUS WITH DIABETIC NEUROPATHY, WITHOUT LONG-TERM CURRENT USE OF INSULIN: ICD-10-CM

## 2024-10-23 DIAGNOSIS — Z12.83 SKIN CANCER SCREENING: ICD-10-CM

## 2024-10-23 DIAGNOSIS — R19.7 DIARRHEA, UNSPECIFIED TYPE: ICD-10-CM

## 2024-10-23 DIAGNOSIS — R61 DIAPHORESIS: ICD-10-CM

## 2024-10-23 DIAGNOSIS — I10 PRIMARY HYPERTENSION: ICD-10-CM

## 2024-10-23 LAB
DEPRECATED RDW RBC AUTO: 41.5 FL (ref 37–54)
ERYTHROCYTE [DISTWIDTH] IN BLOOD BY AUTOMATED COUNT: 13.9 % (ref 12.3–15.4)
HBA1C MFR BLD: 6.5 % (ref 4.8–5.6)
HCT VFR BLD AUTO: 39.4 % (ref 37.5–51)
HGB BLD-MCNC: 13.1 G/DL (ref 13–17.7)
MCH RBC QN AUTO: 27 PG (ref 26.6–33)
MCHC RBC AUTO-ENTMCNC: 33.2 G/DL (ref 31.5–35.7)
MCV RBC AUTO: 81.2 FL (ref 79–97)
PLATELET # BLD AUTO: 233 10*3/MM3 (ref 140–450)
PMV BLD AUTO: 9.7 FL (ref 6–12)
RBC # BLD AUTO: 4.85 10*6/MM3 (ref 4.14–5.8)
WBC NRBC COR # BLD AUTO: 3.36 10*3/MM3 (ref 3.4–10.8)

## 2024-10-23 PROCEDURE — 83036 HEMOGLOBIN GLYCOSYLATED A1C: CPT

## 2024-10-23 PROCEDURE — 80061 LIPID PANEL: CPT

## 2024-10-23 PROCEDURE — 82043 UR ALBUMIN QUANTITATIVE: CPT | Performed by: NURSE PRACTITIONER

## 2024-10-23 PROCEDURE — 80053 COMPREHEN METABOLIC PANEL: CPT

## 2024-10-23 PROCEDURE — 85027 COMPLETE CBC AUTOMATED: CPT

## 2024-10-23 PROCEDURE — 93000 ELECTROCARDIOGRAM COMPLETE: CPT | Performed by: NURSE PRACTITIONER

## 2024-10-23 PROCEDURE — 36415 COLL VENOUS BLD VENIPUNCTURE: CPT

## 2024-10-23 PROCEDURE — 83721 ASSAY OF BLOOD LIPOPROTEIN: CPT

## 2024-10-23 PROCEDURE — 99396 PREV VISIT EST AGE 40-64: CPT | Performed by: NURSE PRACTITIONER

## 2024-10-23 PROCEDURE — 82570 ASSAY OF URINE CREATININE: CPT | Performed by: NURSE PRACTITIONER

## 2024-10-23 PROCEDURE — 99214 OFFICE O/P EST MOD 30 MIN: CPT | Performed by: NURSE PRACTITIONER

## 2024-10-23 RX ORDER — MONTELUKAST SODIUM 4 MG/1
1 TABLET, CHEWABLE ORAL 2 TIMES DAILY WITH MEALS
Qty: 180 TABLET | Refills: 3 | Status: SHIPPED | OUTPATIENT
Start: 2024-10-23

## 2024-10-23 RX ORDER — FENOFIBRATE 160 MG/1
160 TABLET ORAL DAILY
Qty: 90 TABLET | Refills: 3 | Status: SHIPPED | OUTPATIENT
Start: 2024-10-23

## 2024-10-23 RX ORDER — LISINOPRIL 20 MG/1
20 TABLET ORAL DAILY
Qty: 90 TABLET | Refills: 0 | Status: SHIPPED | OUTPATIENT
Start: 2024-10-23

## 2024-10-23 RX ORDER — ATORVASTATIN CALCIUM 10 MG/1
10 TABLET, FILM COATED ORAL DAILY
Qty: 90 TABLET | Refills: 1 | Status: SHIPPED | OUTPATIENT
Start: 2024-10-23

## 2024-10-23 NOTE — PROGRESS NOTES
Chief Complaint  Anthony Brown presents to [facility] for Annual Exam, Anxiety, Hypertension, Abreu's esophagus with dysplasia, and Diabetes      Subjective     History of Present Illness  Jeremy is here today for annual exam.   Last annual exam was  1 year(s)  Last eye exam: 1 year  PROVIDER:  [facility]   Last dental exam:   6 months    PROVIDER:  [facility]    Diet / exercise:   No special diet or specific exercise program  Class 3 Severe Obesity (BMI >=40). Obesity-related health conditions include the following: obstructive sleep apnea, hypertension, and diabetes mellitus. Obesity is worsening. BMI is is above average; BMI management plan is completed. We discussed portion control and increasing exercise.      10 year cardiovascular risk assessment  The 10-year ASCVD risk score (Karen DEVLIN, et al., 2019) is: 28.7%    Values used to calculate the score:      Age: 59 years      Sex: Male      Is Non- : No      Diabetic: Yes      Tobacco smoker: No      Systolic Blood Pressure: 134 mmHg      Is BP treated: Yes      HDL Cholesterol: 21 mg/dL      Total Cholesterol: 181 mg/dL     Patient Care Team:  Mercedes Armstrong APRN as PCP - General (Nurse Practitioner)  Milo Deluca MD as Consulting Physician (Pulmonary Disease)  Hanna Bethea APRN as Nurse Practitioner (Psychiatry)     Anthony Brown DECLINES or DEFERS THE FOLLOWING HEALTH MAINTENANCE RECOMMENDATIONS DISCUSSED TODAY:  >Influenza vaccine and >Zoster    Regarding barrettes esophagus with dysplasia, taking omeprazole daily     Regarding bipolar d/o  taking lamictal, duloxetine and managed by RAY Yates    Regarding diarrhea, taking bentyl four times daily and     Jeremy presents today for follow up on hypertension.    Current medication treatment includes Lisinopril 20 mg, and is compliant with medications.   Side effects reported :  No  Medication changes are not  recommended at this time .      Diabetes type:  Type 2    A1C Last 3 Results          1/15/2024    14:48 2024    07:31 2024    13:05   HGBA1C Last 3 Results   Hemoglobin A1C 8.00  5.70  6.3          Current diabetic medications include  none    Diabetic Review of Systems:  Medication compliance: n/a  Diabetic diet compliance: noncompliant much of the time  Blood sugar lo-130's  up to 140s on occasion  Any hypoglycemic episodes? - No    He did not show up for his last appt with diabetes care Susy Pyle 10/11/2024.  He last visit with her was  and was advised to continue dietary modifications as patient desired  He has picked up 11 lbs since his appt in  with her.       Is He on ACE inhibitor or angiotensin II receptor blocker and a statin? Lisinopril 20 mg    atorvastatin (lipitor) 10 mg and fenofibrate    Arthritis   taking celebrex 200 mg daily   He is still having the shoulder pain and knee pain.  This occurs frequently- severe 2 times weekly. We have worked up this shoulder /axillary pain over the past few years (but has had this for about 30 years)  we did have them work up his GB due to pain associated with eating certain foods.      He is concerned that maybe some of his symptoms are from food allergies. And is having a hard time breathing / Nose breathing.  He did have  food allergy testing in the past showing several food allergies, he did not follow recommendations and maybe now would be interested in seeing an allergist/ENT for further evaluation .  He does feel congested much of the time, sinuses always full and pressure.       He has been having problems with drenching sweats- this has been going on for about a year - always associated with eating. Not associated with any of his medications Denies any chest pain other than his right axillary pain- sometimes will radiate across the chest   - but this is not associated / correlation with the chest pain               Assessment and  Plan         - well balanced diet and exercise as tolerated  - annual wellness exams are recommended  - health care maintenance and gaps in care and orders have been placed as necessary and as willing by the patient.     Diagnoses and all orders for this visit:    1. Routine general medical examination at a health care facility (Primary)    2. Type 2 diabetes mellitus with other specified complication, without long-term current use of insulin  Comments:  Repeat labs consider resuming medication consider continuous glucose monitor to determine if sweats associated with blood sugar  Orders:  -     Microalbumin / Creatinine Urine Ratio - Urine, Clean Catch; Future  -     Microalbumin / Creatinine Urine Ratio - Urine, Clean Catch  -     Hemoglobin A1c; Future    3. Long term (current) use of non-steroidal anti-inflammatories (nsaid)  Comments:  Will check CBC  Orders:  -     Comprehensive metabolic panel; Future  -     CBC No Differential; Future    4. Abreu's esophagus with dysplasia, unspecified  Comments:  Continue daily treatment as recommended  Orders:  -     Comprehensive metabolic panel; Future  -     omeprazole (priLOSEC) 20 MG capsule; Take 1 capsule by mouth Daily.  Dispense: 90 capsule; Refill: 0    5. Diarrhea, unspecified type  Comments:  continue colestipol, and bentyl PRN  Orders:  -     colestipol (COLESTID) 1 g tablet; Take 1 tablet by mouth 2 (Two) Times a Day With Meals.  Dispense: 180 tablet; Refill: 3    6. High cholesterol  Comments:  discused CVD risk   consider adding statin if remaining elevated  Orders:  -     fenofibrate 160 MG tablet; Take 1 tablet by mouth Daily.  Dispense: 90 tablet; Refill: 3  -     atorvastatin (LIPITOR) 10 MG tablet; Take 1 tablet by mouth Daily.  Dispense: 90 tablet; Refill: 1  -     Lipid panel; Future    7. Primary hypertension  Comments:  Well controlled on Medication  continue current treatment   Orders:  -     lisinopril (PRINIVIL,ZESTRIL) 20 MG tablet; Take 1  tablet by mouth Daily.  Dispense: 90 tablet; Refill: 0    8. Type 2 diabetes mellitus with diabetic neuropathy, without long-term current use of insulin  -     atorvastatin (LIPITOR) 10 MG tablet; Take 1 tablet by mouth Daily.  Dispense: 90 tablet; Refill: 1    9. Diaphoresis  Comments:  EKG looks good today consider medication as the cause or may consider continuous glucose monitoring to determine if blood sugar at the cause  Orders:  -     ECG 12 Lead    10. Skin cancer screening  Comments:  Referral  Orders:  -     Ambulatory Referral to Dermatology    11. Multiple food allergies  Comments:  Referral for additional testing and guidance  Orders:  -     Ambulatory Referral to ENT (Otolaryngology)    12. Dyspnea, unspecified type  -     Comprehensive metabolic panel; Future  -     Ambulatory Referral to ENT (Otolaryngology)    13. Chest wall discomfort  -     Comprehensive metabolic panel; Future  -     ECG 12 Lead         Follow Up     Return in about 3 months (around 1/23/2025) for Recheck.  Future Appointments   Date Time Provider Department Center   4/22/2025  8:00 AM Mercedes Armstrong APRN [facility]   8/15/2025  8:00 AM Hanna Bethea APRN [facility]       New Medications Ordered This Visit   Medications    omeprazole (priLOSEC) 20 MG capsule     Sig: Take 1 capsule by mouth Daily.     Dispense:  90 capsule     Refill:  0     Please send a replace/new response with 90-Day Supply if appropriate to maximize member benefit. Requesting 1 year supply.    fenofibrate 160 MG tablet     Sig: Take 1 tablet by mouth Daily.     Dispense:  90 tablet     Refill:  3     Please send a replace/new response with 90-Day Supply if appropriate to maximize member benefit. Requesting 1 year supply.    lisinopril (PRINIVIL,ZESTRIL) 20 MG tablet     Sig: Take 1 tablet by mouth Daily.     Dispense:  90 tablet     Refill:  0     Please send a replace/new response with 90-Day Supply if appropriate to maximize member benefit.  "Requesting 1 year supply.    colestipol (COLESTID) 1 g tablet     Sig: Take 1 tablet by mouth 2 (Two) Times a Day With Meals.     Dispense:  180 tablet     Refill:  3     Please send a replace/new response with 90-Day Supply if appropriate to maximize member benefit. Requesting 1 year supply.    atorvastatin (LIPITOR) 10 MG tablet     Sig: Take 1 tablet by mouth Daily.     Dispense:  90 tablet     Refill:  1     Please send a replace/new response with 90-Day Supply if appropriate to maximize member benefit. Requesting 1 year supply.       Medications Discontinued During This Encounter   Medication Reason    colestipol (COLESTID) 1 g tablet Reorder    fenofibrate 160 MG tablet Reorder    atorvastatin (LIPITOR) 10 MG tablet Reorder    lisinopril (PRINIVIL,ZESTRIL) 20 MG tablet Reorder    omeprazole (priLOSEC) 20 MG capsule Reorder         Review of Systems   Constitutional:  Positive for diaphoresis and fatigue.   Respiratory:  Positive for choking (will occaionally feel like needs to get air - requires him to sit and calm down (happens one time per week, x 6 months )). Negative for cough and shortness of breath.    Cardiovascular:  Positive for chest pain (right axillary pain (x 30 years)).   Gastrointestinal:  Positive for diarrhea. Negative for vomiting.   Musculoskeletal:  Positive for arthralgias and back pain.   Skin:  Positive for skin lesions (bilateral arms  treated). Negative for rash.       Objective     Vitals:    10/23/24 0814   BP: 134/82   BP Location: Right arm   Patient Position: Sitting   Cuff Size: Adult   Pulse: 72   Temp: 98.1 °F (36.7 °C)   TempSrc: Oral   SpO2: 96%   Weight: 131 kg (288 lb)   Height: 175.3 cm (69\")       Physical Exam  Vitals reviewed.   Constitutional:       Appearance: Normal appearance. He is obese. He is diaphoretic.   HENT:      Head: Normocephalic.   Eyes:      Pupils: Pupils are equal, round, and reactive to light.   Cardiovascular:      Rate and Rhythm: Normal rate and " regular rhythm.      Heart sounds: No murmur heard.  Pulmonary:      Effort: Pulmonary effort is normal.      Breath sounds: Normal breath sounds.   Musculoskeletal:         General: Normal range of motion.   Neurological:      Mental Status: He is alert.   Psychiatric:         Mood and Affect: Mood normal.         Behavior: Behavior normal.              ECG 12 Lead    Date/Time: 10/23/2024 12:34 PM  Performed by: Mercedes Armstrong APRN    Authorized by: Mercedes Armstrong APRN  Comparison: compared with previous ECG from 6/15/2017  Similar to previous ECG  Rhythm: sinus rhythm  Conduction: conduction normal  ST Elevation: aVR  ST Depression: II  ST Flattening: III  T flattening: III and V1    Clinical impression: normal ECG  Comments: mjm          Result Review         No Known Allergies   Past Medical History:   Diagnosis Date    Acute URI     Arthritis     bialteral knees    Abreu's esophagus with dysplasia, unspecified     Bipolar disorder     Callus Always    Chronic pain disorder     Colon polyp 2019?    Contact with and (suspected) exposure to other viral communicable diseases     Depression     Deviated nasal septum     Diabetes mellitus 9/23    Difficulty walking Feb 2022    Random pain    GERD (gastroesophageal reflux disease) 2008    High cholesterol     History of chest pain     6 YEARS AGO, HAD STRESS TEST NORMAL    Hypertension     Ingrown toenail Childhood    Low back pain     Low testosterone level in male     HAS TESTOSTERONE PELLETS IMPLANTED    Numbness in feet     Obesity     Pain in joint     UNSPECIFIED    Plantar fasciitis 1998    No other occurrence    Shin splints Random over years    During or after exercise    Sinusitis     Sleep apnea with use of continuous positive airway pressure (CPAP)     Sleep apnea, unspecified     SOB (shortness of breath)     Testicular hypofunction     Testosterone deficiency      Current Outpatient Medications   Medication Sig Dispense Refill    atorvastatin  (LIPITOR) 10 MG tablet Take 1 tablet by mouth Daily. 90 tablet 1    celecoxib (CeleBREX) 200 MG capsule TAKE 1 CAPSULE BY MOUTH DAILY 30 capsule 11    Cholecalciferol (VITAMIN D3) 5000 UNITS capsule capsule Take 2,000 Units by mouth Daily.      colestipol (COLESTID) 1 g tablet Take 1 tablet by mouth 2 (Two) Times a Day With Meals. 180 tablet 3    dicyclomine (BENTYL) 10 MG capsule Take 1 capsule by mouth 4 (Four) Times a Day Before Meals & at Bedtime. 120 capsule 2    DULoxetine HCl 40 MG capsule delayed-release particles Take 1 capsule by mouth Every Morning. Indications: Fibromyalgia Syndrome, Generalized Anxiety Disorder, Major Depressive Disorder 90 capsule 3    fenofibrate 160 MG tablet Take 1 tablet by mouth Daily. 90 tablet 3    fluticasone (FLONASE) 50 MCG/ACT nasal spray Use 2 sprays into the nostril(s) as directed by provider Daily. 16 g 3    lamoTRIgine (LaMICtal) 200 MG tablet Take 1 tablet by mouth Every Morning. Indications: Depressive Phase of Manic-Depression 90 tablet 3    lisinopril (PRINIVIL,ZESTRIL) 20 MG tablet Take 1 tablet by mouth Daily. 90 tablet 0    multivitamin with minerals tablet tablet Take 1 tablet by mouth Daily.      omeprazole (priLOSEC) 20 MG capsule Take 1 capsule by mouth Daily. 90 capsule 0    Probiotic Product (PROBIOTIC PO) Take 1 capsule by mouth Daily. PT TO HOLD MED PRIOR TO OR      vitamin B-12 (CYANOCOBALAMIN) 1000 MCG tablet Take 5 tablets by mouth Daily.       No current facility-administered medications for this visit.     Past Surgical History:   Procedure Laterality Date    COLONOSCOPY  2019?    HERNIA REPAIR      Inguinal    JOINT REPLACEMENT  06/2017    Right knee    KNEE SURGERY Right 1985    AR ARTHRP KNE CONDYLE&PLATU MEDIAL&LAT COMPARTMENTS N/A 06/26/2017    Procedure: RIGHT TOTAL KNEE ARTHROPLASTY, LEFT KNEE INJECTED WITH CORTISONE;  Surgeon: Bradford Torres MD;  Location: [facility];  Service: Orthopedics    TONSILLECTOMY  1970    VASECTOMY  1998       Health Maintenance Due   Topic Date Due    URINE MICROALBUMIN  Never done    Hepatitis B (1 of 3 - 19+ 3-dose series) Never done    ZOSTER VACCINE (1 of 2) Never done    DIABETIC FOOT EXAM  Never done    HEMOGLOBIN A1C  12/07/2024    COLORECTAL CANCER SCREENING  04/12/2025      Immunization History   Administered Date(s) Administered    COVID-19 (JAGDEEP) 05/14/2021    TD Preservative Free (Tenivac) 03/10/2017         Part of this note may be an electronic transcription/translation of spoken language to printed   text using the Dragon Dictation System.      Mercedes Armstrong, APRN

## 2024-10-24 LAB
ALBUMIN SERPL-MCNC: 4 G/DL (ref 3.5–5.2)
ALBUMIN UR-MCNC: <1.2 MG/DL
ALBUMIN/GLOB SERPL: 1.3 G/DL
ALP SERPL-CCNC: 100 U/L (ref 39–117)
ALT SERPL W P-5'-P-CCNC: 63 U/L (ref 1–41)
ANION GAP SERPL CALCULATED.3IONS-SCNC: 10.9 MMOL/L (ref 5–15)
ARTICHOKE IGE QN: 28 MG/DL (ref 0–100)
AST SERPL-CCNC: 47 U/L (ref 1–40)
BILIRUB SERPL-MCNC: 0.2 MG/DL (ref 0–1.2)
BUN SERPL-MCNC: 10 MG/DL (ref 6–20)
BUN/CREAT SERPL: 9.8 (ref 7–25)
CALCIUM SPEC-SCNC: 9.7 MG/DL (ref 8.6–10.5)
CHLORIDE SERPL-SCNC: 102 MMOL/L (ref 98–107)
CHOLEST SERPL-MCNC: 168 MG/DL (ref 0–200)
CO2 SERPL-SCNC: 25.1 MMOL/L (ref 22–29)
CREAT SERPL-MCNC: 1.02 MG/DL (ref 0.76–1.27)
CREAT UR-MCNC: 161.8 MG/DL
EGFRCR SERPLBLD CKD-EPI 2021: 85.2 ML/MIN/1.73
GLOBULIN UR ELPH-MCNC: 3.2 GM/DL
GLUCOSE SERPL-MCNC: 165 MG/DL (ref 65–99)
HDLC SERPL-MCNC: 14 MG/DL (ref 40–60)
LDLC SERPL CALC-MCNC: ABNORMAL MG/DL
LDLC/HDLC SERPL: ABNORMAL {RATIO}
MICROALBUMIN/CREAT UR: NORMAL MG/G{CREAT}
POTASSIUM SERPL-SCNC: 4.1 MMOL/L (ref 3.5–5.2)
PROT SERPL-MCNC: 7.2 G/DL (ref 6–8.5)
SODIUM SERPL-SCNC: 138 MMOL/L (ref 136–145)
TRIGL SERPL-MCNC: 1155 MG/DL (ref 0–150)
VLDLC SERPL-MCNC: ABNORMAL MG/DL

## 2025-01-03 DIAGNOSIS — R19.7 DIARRHEA, UNSPECIFIED TYPE: ICD-10-CM

## 2025-01-03 RX ORDER — DICYCLOMINE HYDROCHLORIDE 10 MG/1
10 CAPSULE ORAL
Qty: 120 CAPSULE | Refills: 2 | Status: SHIPPED | OUTPATIENT
Start: 2025-01-03

## 2025-01-15 DIAGNOSIS — I10 PRIMARY HYPERTENSION: ICD-10-CM

## 2025-01-15 DIAGNOSIS — K22.719 BARRETT'S ESOPHAGUS WITH DYSPLASIA, UNSPECIFIED: ICD-10-CM

## 2025-01-16 RX ORDER — LISINOPRIL 20 MG/1
20 TABLET ORAL DAILY
Qty: 90 TABLET | Refills: 0 | Status: SHIPPED | OUTPATIENT
Start: 2025-01-16

## 2025-01-31 DIAGNOSIS — F31.75 BIPOLAR DISORDER, IN PARTIAL REMISSION, MOST RECENT EPISODE DEPRESSED: ICD-10-CM

## 2025-01-31 DIAGNOSIS — G89.29 OTHER CHRONIC PAIN: ICD-10-CM

## 2025-01-31 RX ORDER — DULOXETIN HYDROCHLORIDE 20 MG/1
CAPSULE, DELAYED RELEASE ORAL
Qty: 90 CAPSULE | Refills: 3 | OUTPATIENT
Start: 2025-01-31

## 2025-04-01 ENCOUNTER — OFFICE VISIT (OUTPATIENT)
Dept: FAMILY MEDICINE CLINIC | Age: 60
End: 2025-04-01
Payer: COMMERCIAL

## 2025-04-01 VITALS
WEIGHT: 275.4 LBS | BODY MASS INDEX: 40.79 KG/M2 | OXYGEN SATURATION: 96 % | DIASTOLIC BLOOD PRESSURE: 82 MMHG | SYSTOLIC BLOOD PRESSURE: 135 MMHG | HEART RATE: 69 BPM | HEIGHT: 69 IN | TEMPERATURE: 98.9 F

## 2025-04-01 DIAGNOSIS — M17.12 PRIMARY OSTEOARTHRITIS OF LEFT KNEE: Primary | ICD-10-CM

## 2025-04-01 DIAGNOSIS — G47.30 SLEEP APNEA WITH USE OF CONTINUOUS POSITIVE AIRWAY PRESSURE (CPAP): ICD-10-CM

## 2025-04-01 DIAGNOSIS — E66.813 CLASS 3 SEVERE OBESITY DUE TO EXCESS CALORIES WITHOUT SERIOUS COMORBIDITY WITH BODY MASS INDEX (BMI) OF 40.0 TO 44.9 IN ADULT: ICD-10-CM

## 2025-04-01 DIAGNOSIS — E66.01 CLASS 3 SEVERE OBESITY DUE TO EXCESS CALORIES WITHOUT SERIOUS COMORBIDITY WITH BODY MASS INDEX (BMI) OF 40.0 TO 44.9 IN ADULT: ICD-10-CM

## 2025-04-01 DIAGNOSIS — I10 PRIMARY HYPERTENSION: ICD-10-CM

## 2025-04-01 DIAGNOSIS — E11.40 TYPE 2 DIABETES MELLITUS WITH DIABETIC NEUROPATHY, WITHOUT LONG-TERM CURRENT USE OF INSULIN: ICD-10-CM

## 2025-04-01 PROCEDURE — 99214 OFFICE O/P EST MOD 30 MIN: CPT | Performed by: NURSE PRACTITIONER

## 2025-04-01 NOTE — PROGRESS NOTES
"Chief Complaint  Anthony Brown presents to [facility] for Surgical Clearance ((L) total knee replacement )    Subjective     History of Present Illness  Jeremy is here for preop clearance for a left total knee replacement with Dr. Donavan Clark at [facility] . He has a history of DM, JEANNIE,  Hypertension, Barretts esoophagus  He is scheduled for 4/16 and planning on KORT for therapy after.  He did have EKG, CBC, BMP, A1C  EKG looks similar to 2017 and 2024.  He has not had any problems with anesthesia in the past.  His diabetes is well controlled with A1C 6%.  Blood pressure is overall borderline running in 130s/80s.  He had a right knee replacement in the past with no complications       Assessment and Plan     Diagnoses and all orders for this visit:    1. Primary osteoarthritis of left knee (Primary)  Comments:  from primary care standpoint, ok for left total knee replacement pending requested evaluation from ortho    2. Type 2 diabetes mellitus with diabetic neuropathy, without long-term current use of insulin    3. Class 3 severe obesity due to excess calories without serious comorbidity with body mass index (BMI) of 40.0 to 44.9 in adult    4. Primary hypertension    5. Sleep apnea with use of continuous positive airway pressure (CPAP)            Follow Up   No follow-ups on file.  Future Appointments   Date Time Provider Department Center   4/22/2025  8:00 AM Mercedes Armstrong APRN [facility] BARDS [facility]   8/15/2025  8:00 AM Hanna Bethea APRN [facility] BARD [facility]       No orders of the defined types were placed in this encounter.      There are no discontinued medications.       Review of Systems    Objective     Vitals:    04/01/25 1111   BP: 135/82   BP Location: Left arm   Patient Position: Sitting   Cuff Size: Large Adult   Pulse: 69   Temp: 98.9 °F (37.2 °C)   TempSrc: Oral   SpO2: 96%   Weight: 125 kg (275 lb 6.4 oz)   Height: 175.3 cm (69\")         Physical Exam  Vitals " reviewed.   Constitutional:       Appearance: Normal appearance.   HENT:      Head: Normocephalic.   Eyes:      Pupils: Pupils are equal, round, and reactive to light.   Cardiovascular:      Rate and Rhythm: Normal rate and regular rhythm.      Heart sounds: No murmur heard.  Pulmonary:      Effort: Pulmonary effort is normal.      Breath sounds: Normal breath sounds.   Musculoskeletal:         General: Normal range of motion.   Neurological:      Mental Status: He is alert.   Psychiatric:         Mood and Affect: Mood normal.         Behavior: Behavior normal.               Result Review        No Known Allergies   Past Medical History:   Diagnosis Date    Acute URI     Arthritis     bialteral knees    Abreu esophagus 2008?    Abreu's esophagus with dysplasia, unspecified     Bipolar disorder     Callus Always    Chronic pain disorder     Colon polyp 2019?    Contact with and (suspected) exposure to other viral communicable diseases     CTS (carpal tunnel syndrome) 2018    Depression     Deviated nasal septum     Diabetes mellitus 9/23    Difficulty walking Feb 2022    Random pain    Fibromyalgia, primary 2020    GERD (gastroesophageal reflux disease) 2008    Headache, tension-type     High cholesterol     History of chest pain     6 YEARS AGO, HAD STRESS TEST NORMAL    Hypertension     Ingrown toenail Childhood    Lactose intolerance 2016?    Low back pain     Low testosterone level in male     HAS TESTOSTERONE PELLETS IMPLANTED    Numbness in feet     Obesity     Pain in joint     UNSPECIFIED    Plantar fasciitis 1998    No other occurrence    Shin splints Random over years    During or after exercise    Sinusitis     Sleep apnea with use of continuous positive airway pressure (CPAP)     Sleep apnea, unspecified     SOB (shortness of breath)     Testicular hypofunction     Testosterone deficiency      Current Outpatient Medications   Medication Sig Dispense Refill    atorvastatin (LIPITOR) 10 MG tablet Take 1  tablet by mouth Daily. 90 tablet 1    celecoxib (CeleBREX) 200 MG capsule TAKE 1 CAPSULE BY MOUTH DAILY 30 capsule 11    Cholecalciferol (VITAMIN D3) 5000 UNITS capsule capsule Take 2,000 Units by mouth Daily.      colestipol (COLESTID) 1 g tablet Take 1 tablet by mouth 2 (Two) Times a Day With Meals. 180 tablet 3    dicyclomine (BENTYL) 10 MG capsule Take 1 capsule by mouth 4 (Four) Times a Day Before Meals & at Bedtime. 120 capsule 2    DULoxetine HCl 40 MG capsule delayed-release particles Take 1 capsule by mouth Every Morning. Indications: Fibromyalgia Syndrome, Generalized Anxiety Disorder, Major Depressive Disorder 90 capsule 3    fenofibrate 160 MG tablet Take 1 tablet by mouth Daily. 90 tablet 3    fluticasone (FLONASE) 50 MCG/ACT nasal spray Use 2 sprays into the nostril(s) as directed by provider Daily. 16 g 3    lamoTRIgine (LaMICtal) 200 MG tablet Take 1 tablet by mouth Every Morning. Indications: Depressive Phase of Manic-Depression 90 tablet 3    lisinopril (PRINIVIL,ZESTRIL) 20 MG tablet TAKE 1 TABLET BY MOUTH DAILY 90 tablet 0    multivitamin with minerals tablet tablet Take 1 tablet by mouth Daily.      omeprazole (priLOSEC) 20 MG capsule TAKE 1 CAPSULE BY MOUTH DAILY 90 capsule 0    Probiotic Product (PROBIOTIC PO) Take 1 capsule by mouth Daily. PT TO HOLD MED PRIOR TO OR      vitamin B-12 (CYANOCOBALAMIN) 1000 MCG tablet Take 5 tablets by mouth Daily.      aspirin 325 MG EC tablet Take 1 tablet by mouth 2 (Two) Times a Day for 2 weeks after surgery 28 tablet 0    mupirocin (BACTROBAN) 2 % ointment Apply to opening of each nostril 2 times daily for 5 consecutive days before sugery and morning of surgery 15 g 0    ondansetron ODT (ZOFRAN-ODT) 4 MG disintegrating tablet Place 1 tablet on the tongue Every 4 (Four) to 6 (Six) Hours As Needed. 30 tablet 0    oxyCODONE-acetaminophen (PERCOCET) 5-325 MG per tablet Take 1-2 tablets by mouth Every 4-6 Hours As Needed for pain. 48 tablet 0     No current  facility-administered medications for this visit.     Past Surgical History:   Procedure Laterality Date    COLONOSCOPY  2019?    HERNIA REPAIR      Inguinal    JOINT REPLACEMENT  06/2017    Right knee    KNEE SURGERY Right 1985    KS ARTHRP KNE CONDYLE&PLATU MEDIAL&LAT COMPARTMENTS N/A 06/26/2017    Procedure: RIGHT TOTAL KNEE ARTHROPLASTY, LEFT KNEE INJECTED WITH CORTISONE;  Surgeon: Bradford Torres MD;  Location: [facility];  Service: Orthopedics    TONSILLECTOMY  1970    UPPER GASTROINTESTINAL ENDOSCOPY  2019?    VASECTOMY  1998      Health Maintenance Due   Topic Date Due    DIABETIC FOOT EXAM  Never done    Hepatitis B (1 of 3 - 19+ 3-dose series) Never done    Pneumococcal Vaccine 50+ (1 of 2 - PCV) Never done    ZOSTER VACCINE (1 of 2) Never done    ANNUAL WELLNESS VISIT  Never done    COLORECTAL CANCER SCREENING  04/12/2025      Immunization History   Administered Date(s) Administered    COVID-19 (JAGDEEP) 05/14/2021    TD Preservative Free (Tenivac) 03/10/2017         Part of this note may be an electronic transcription/translation of spoken language to printed   text using the Dragon Dictation System.      Mercedes Armstrong, APRN

## 2025-04-17 DIAGNOSIS — K22.719 BARRETT'S ESOPHAGUS WITH DYSPLASIA, UNSPECIFIED: ICD-10-CM

## 2025-04-17 DIAGNOSIS — I10 PRIMARY HYPERTENSION: ICD-10-CM

## 2025-04-18 RX ORDER — LISINOPRIL 20 MG/1
20 TABLET ORAL DAILY
Qty: 90 TABLET | Refills: 0 | Status: SHIPPED | OUTPATIENT
Start: 2025-04-18

## 2025-04-18 RX ORDER — OMEPRAZOLE 20 MG/1
20 CAPSULE, DELAYED RELEASE ORAL DAILY
Qty: 90 CAPSULE | Refills: 0 | Status: SHIPPED | OUTPATIENT
Start: 2025-04-18

## 2025-05-28 DIAGNOSIS — E11.40 TYPE 2 DIABETES MELLITUS WITH DIABETIC NEUROPATHY, WITHOUT LONG-TERM CURRENT USE OF INSULIN: ICD-10-CM

## 2025-05-28 DIAGNOSIS — E78.00 HIGH CHOLESTEROL: ICD-10-CM

## 2025-05-29 RX ORDER — ATORVASTATIN CALCIUM 10 MG/1
10 TABLET, FILM COATED ORAL DAILY
Qty: 90 TABLET | Refills: 0 | Status: SHIPPED | OUTPATIENT
Start: 2025-05-29

## 2025-06-20 ENCOUNTER — TELEPHONE (OUTPATIENT)
Dept: FAMILY MEDICINE CLINIC | Age: 60
End: 2025-06-20
Payer: MEDICARE

## 2025-06-20 NOTE — TELEPHONE ENCOUNTER
Called patient and lvm for him to schedule an Establish New Provider Appointment. -Due to Mercedes Armstrong leaving our practice.

## 2025-06-25 DIAGNOSIS — M17.11 PRIMARY OSTEOARTHRITIS OF RIGHT KNEE: ICD-10-CM

## 2025-06-26 RX ORDER — CELECOXIB 200 MG/1
200 CAPSULE ORAL DAILY
Qty: 90 CAPSULE | Refills: 3 | Status: SHIPPED | OUTPATIENT
Start: 2025-06-26

## 2025-07-07 DIAGNOSIS — R19.7 DIARRHEA, UNSPECIFIED TYPE: ICD-10-CM

## 2025-07-07 RX ORDER — DICYCLOMINE HYDROCHLORIDE 10 MG/1
10 CAPSULE ORAL
Qty: 360 CAPSULE | Refills: 1 | Status: SHIPPED | OUTPATIENT
Start: 2025-07-07

## 2025-07-13 DIAGNOSIS — K22.719 BARRETT'S ESOPHAGUS WITH DYSPLASIA, UNSPECIFIED: ICD-10-CM

## 2025-07-13 DIAGNOSIS — I10 PRIMARY HYPERTENSION: ICD-10-CM

## 2025-07-14 DIAGNOSIS — G89.29 OTHER CHRONIC PAIN: ICD-10-CM

## 2025-07-14 DIAGNOSIS — F41.1 GENERALIZED ANXIETY DISORDER: ICD-10-CM

## 2025-07-14 DIAGNOSIS — F31.70 BIPOLAR DISORDER, CURRENTLY IN REMISSION: ICD-10-CM

## 2025-07-14 RX ORDER — LISINOPRIL 20 MG/1
20 TABLET ORAL DAILY
Qty: 90 TABLET | Refills: 0 | Status: SHIPPED | OUTPATIENT
Start: 2025-07-14

## 2025-07-14 RX ORDER — OMEPRAZOLE 20 MG/1
20 CAPSULE, DELAYED RELEASE ORAL DAILY
Qty: 90 CAPSULE | Refills: 0 | Status: SHIPPED | OUTPATIENT
Start: 2025-07-14

## 2025-07-15 RX ORDER — DULOXETINE 40 MG/1
CAPSULE, DELAYED RELEASE ORAL
Qty: 90 CAPSULE | Refills: 3 | Status: SHIPPED | OUTPATIENT
Start: 2025-07-15

## 2025-08-09 DIAGNOSIS — F31.70 BIPOLAR DISORDER, CURRENTLY IN REMISSION: ICD-10-CM

## 2025-08-11 RX ORDER — LAMOTRIGINE 200 MG/1
TABLET ORAL
Qty: 90 TABLET | Refills: 3 | OUTPATIENT
Start: 2025-08-11

## 2025-08-15 ENCOUNTER — OFFICE VISIT (OUTPATIENT)
Dept: BEHAVIORAL HEALTH | Facility: CLINIC | Age: 60
End: 2025-08-15
Payer: COMMERCIAL

## 2025-08-15 VITALS
BODY MASS INDEX: 40.38 KG/M2 | SYSTOLIC BLOOD PRESSURE: 120 MMHG | HEART RATE: 75 BPM | DIASTOLIC BLOOD PRESSURE: 72 MMHG | WEIGHT: 272.6 LBS | HEIGHT: 69 IN

## 2025-08-15 DIAGNOSIS — G47.30 SLEEP APNEA WITH USE OF CONTINUOUS POSITIVE AIRWAY PRESSURE (CPAP): ICD-10-CM

## 2025-08-15 DIAGNOSIS — G93.32 COVID-19 LONG HAULER MANIFESTING CHRONIC FATIGUE: ICD-10-CM

## 2025-08-15 DIAGNOSIS — G47.10 EXCESSIVE SLEEPINESS: ICD-10-CM

## 2025-08-15 DIAGNOSIS — F41.1 GENERALIZED ANXIETY DISORDER: ICD-10-CM

## 2025-08-15 DIAGNOSIS — F31.70 BIPOLAR DISORDER, CURRENTLY IN REMISSION: Primary | ICD-10-CM

## 2025-08-15 DIAGNOSIS — U09.9 COVID-19 LONG HAULER MANIFESTING CHRONIC FATIGUE: ICD-10-CM

## 2025-08-15 DIAGNOSIS — R53.82 CHRONIC FATIGUE: ICD-10-CM

## 2025-08-15 DIAGNOSIS — Z79.899 MEDICATION MANAGEMENT: ICD-10-CM

## 2025-08-15 DIAGNOSIS — Z71.89 MEDICATION CARE PLAN DISCUSSED WITH PATIENT: ICD-10-CM

## 2025-08-15 RX ORDER — LAMOTRIGINE 200 MG/1
200 TABLET ORAL EVERY MORNING
Qty: 90 TABLET | Refills: 3 | Status: SHIPPED | OUTPATIENT
Start: 2025-08-15 | End: 2026-08-15

## 2025-08-26 DIAGNOSIS — E11.40 TYPE 2 DIABETES MELLITUS WITH DIABETIC NEUROPATHY, WITHOUT LONG-TERM CURRENT USE OF INSULIN: ICD-10-CM

## 2025-08-26 DIAGNOSIS — E78.00 HIGH CHOLESTEROL: ICD-10-CM

## 2025-08-27 RX ORDER — ATORVASTATIN CALCIUM 10 MG/1
10 TABLET, FILM COATED ORAL DAILY
Qty: 90 TABLET | Refills: 0 | Status: SHIPPED | OUTPATIENT
Start: 2025-08-27

## (undated) DEVICE — GLV SURG SENSICARE GREEN W/ALOE PF LF 7 STRL

## (undated) DEVICE — APPL DURAPREP IODOPHOR APL 26ML

## (undated) DEVICE — MAT FLR ABSORBENT LG 4FT 10 2.5FT

## (undated) DEVICE — DRSNG ADAPTIC 3X8

## (undated) DEVICE — GLV SURG TRIUMPH CLASSIC PF LTX 7.5 STRL

## (undated) DEVICE — DUAL CUT SAGITTAL BLADE

## (undated) DEVICE — BNDG ELAS ELITE V/CLOSE 6IN 5YD LF STRL

## (undated) DEVICE — SOL NACL 0.9PCT 100ML SGL

## (undated) DEVICE — SUT VIC 0 CT1 36IN J946H

## (undated) DEVICE — PREP SOL POVIDONE/IODINE BT 4OZ

## (undated) DEVICE — GLV SURG SENSICARE MICRO PF LF 7 STRL

## (undated) DEVICE — ENCORE® LATEX ORTHO SIZE 7.5, STERILE LATEX POWDER-FREE SURGICAL GLOVE: Brand: ENCORE

## (undated) DEVICE — MEDI-VAC YANKAUER SUCTION HANDLE W/BULBOUS TIP: Brand: CARDINAL HEALTH

## (undated) DEVICE — SUT VIC 1 CT1 36IN J947H

## (undated) DEVICE — NDL SPINE 22G 31/2IN BLK

## (undated) DEVICE — STPLR SKIN VISISTAT WD 35CT

## (undated) DEVICE — 3M™ IOBAN™ 2 ANTIMICROBIAL INCISE DRAPE 6640EZ: Brand: IOBAN™ 2

## (undated) DEVICE — PK KN TOTL 40

## (undated) DEVICE — SPNG GZ WOVN 4X4IN 12PLY 10/BX STRL

## (undated) DEVICE — DRSNG WND GZ CURAD OIL EMULSION 3X8IN LF STRL 1PK

## (undated) DEVICE — KT DRN EVAC WND PVC PCH WTROC RND 10F400

## (undated) DEVICE — UNDERCAST PADDING: Brand: DEROYAL

## (undated) DEVICE — PREMIUM WET SKIN PREP TRAY: Brand: MEDLINE INDUSTRIES, INC.